# Patient Record
Sex: MALE | Race: WHITE | NOT HISPANIC OR LATINO | Employment: OTHER | URBAN - METROPOLITAN AREA
[De-identification: names, ages, dates, MRNs, and addresses within clinical notes are randomized per-mention and may not be internally consistent; named-entity substitution may affect disease eponyms.]

---

## 2017-01-11 ENCOUNTER — OFFICE VISIT (OUTPATIENT)
Dept: CARDIOLOGY | Facility: MEDICAL CENTER | Age: 77
End: 2017-01-11
Payer: MEDICARE

## 2017-01-11 VITALS
HEIGHT: 72 IN | SYSTOLIC BLOOD PRESSURE: 150 MMHG | DIASTOLIC BLOOD PRESSURE: 70 MMHG | HEART RATE: 103 BPM | OXYGEN SATURATION: 89 % | WEIGHT: 197 LBS | BODY MASS INDEX: 26.68 KG/M2

## 2017-01-11 DIAGNOSIS — I10 ESSENTIAL HYPERTENSION: ICD-10-CM

## 2017-01-11 DIAGNOSIS — K27.9 PEPTIC ULCER DISEASE: ICD-10-CM

## 2017-01-11 DIAGNOSIS — E78.2 MIXED HYPERLIPIDEMIA: ICD-10-CM

## 2017-01-11 DIAGNOSIS — I25.10 CORONARY ARTERY DISEASE INVOLVING NATIVE CORONARY ARTERY OF NATIVE HEART WITHOUT ANGINA PECTORIS: ICD-10-CM

## 2017-01-11 PROCEDURE — 99214 OFFICE O/P EST MOD 30 MIN: CPT | Performed by: INTERNAL MEDICINE

## 2017-01-11 RX ORDER — METOPROLOL TARTRATE 50 MG/1
50 TABLET, FILM COATED ORAL 2 TIMES DAILY
Qty: 180 TAB | Refills: 3 | Status: SHIPPED | OUTPATIENT
Start: 2017-01-11 | End: 2017-07-20

## 2017-01-11 RX ORDER — RANITIDINE 150 MG/1
150 TABLET ORAL 2 TIMES DAILY
Qty: 60 TAB | Refills: 5 | Status: SHIPPED | OUTPATIENT
Start: 2017-01-11 | End: 2017-08-22 | Stop reason: SDUPTHER

## 2017-01-11 RX ORDER — AMITRIPTYLINE HYDROCHLORIDE 150 MG/1
300 TABLET ORAL
Qty: 180 TAB | Refills: 3 | Status: SHIPPED | OUTPATIENT
Start: 2017-01-11 | End: 2017-11-09 | Stop reason: SDUPTHER

## 2017-01-11 ASSESSMENT — ENCOUNTER SYMPTOMS
SPEECH CHANGE: 0
SHORTNESS OF BREATH: 0
ORTHOPNEA: 0
HEMOPTYSIS: 0
WEAKNESS: 0
NAUSEA: 0
DIZZINESS: 0
ABDOMINAL PAIN: 0
VOMITING: 0
CLAUDICATION: 0
PND: 0
PALPITATIONS: 0
FOCAL WEAKNESS: 0

## 2017-01-11 NOTE — PROGRESS NOTES
Subjective:   Galileo Torrez is a 76 y.o. male who presents today for f/u CAD, HTN, PAD and dyslipidemia    Some atypical chest pain.  Able to ambulate with prosthesis.  No palpitations or dizziness.  BP has been high at rehab.  Has been dealing with his left leg stump but it has finally healed.  Still smoking.  Requestuing refill for ranitidine and amitriptyline  Recently had to change PCP    Past Medical History   Diagnosis Date   • Hypertension    • COPD    • Hyperlipidemia    • Upper GI bleed    • Fall      Multiple falls   • Abnormality of gait    • Transient alteration of awareness    • Atherosclerosis of renal artery ([facility])    • Unspecified disorder of kidney and ureter    • Umbilical hernia without mention of obstruction or gangrene    • Tobacco use disorder    • Insomnia, unspecified    • Peptic ulcer, unspecified site, unspecified as acute or chronic, without mention of hemorrhage, perforation, or obstruction    • Peripheral vascular disease, unspecified ([facility]) February 2012     PCI/stent (Omnilink 7 x 28mm) to the right external iliac.   • Aortic valve disorders    • Chronic systolic heart failure ([facility])    • Dizziness    • Pure hypercholesterolemia    • Unspecified hypertensive heart disease without heart failure    • Other specified forms of chronic ischemic heart disease    • Lumbago    • CAD (coronary artery disease) May 2009     LAD diffuse, 80% distal, %, OM1,%, Grafts all occluded LVEF 20%.  No targets for revascularization.   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  Unchanged from 2013.   • Dental disorder      dentures   • Cough    • Unspecified hemorrhagic conditions ([facility])      blood thinners   • Blood clotting disorder ([facility]) 10/15/15       left leg   • Breath shortness      says from smoking  No O2   • Heart burn    • Snoring    • Myocardial infarct ([facility]) 1995x2     5  "stents   • Stroke (HCC) 2011     \"I don't know when, somebody said I did. A little one\"   • Bowel habit changes      constipation     Past Surgical History   Procedure Laterality Date   • Gastroscopy-endo  3/3/2010     Performed by KALE JUAREZ at ENDOSCOPY [facility]   • Endostat  3/3/2010     Performed by KALE JUAREZ at ENDOSCOPY [facility]   • Sclerotheraphy  3/3/2010     Performed by KALE JUAREZ at ENDOSCOPY [facility]   • Cardiac cath, right/left heart       STENT   • Multiple coronary artery bypass  1994      CABG x 3, in [place]   • Recovery  2/28/2012     Performed by SURGERY, IR[facility]RECOVERY at [facility]   • Recovery  1/15/2015     Performed by Ir-Recovery Surgery at [facility]   • Femoral popliteal bypass  2/11/2015     Performed by Lance Austin M.D. at [facility]   • Other  1984     LEFT ORBIT FX-SURGERY PIN/PLATE   • Groin exploration  3/11/2015     Performed by Lance Austin M.D. at [facility]   • Thrombectomy  3/24/2015     Performed by Lance Austin M.D. at [facility]   • Angiogram  3/24/2015     Performed by Lance Austin M.D. at [facility]   • Recovery  9/4/2015     Procedure: CATH LAB-[facility] W/COROS AND STENT [id number], ANGINA PECTORIS 413.9;  Surgeon: [facility];  [place] [facility]  Service:    • Femoral popliteal bypass Left 10/2/2015     Procedure: FEMORAL POPLITEAL BYPASS;  Surgeon: Lance Austin M.D.;  Location: SURGERY [facility];  Service:    • Angiogram  10/2/2015     Procedure: ANGIOGRAM;  Surgeon: Lance Austin M.D.;  Location: [facility];  Service:    • Toe amputation Left 11/9/2015     Procedure: TOE AMPUTATION FIRST AND SECOND;  Surgeon: Lance Austin M.D.;  Location: [facility];  Service:    • Incision and drainage general Left 11/9/2015     Procedure: INCISION AND DRAINAGE GENERAL " "WASHOUT, & WOUND VAC PLACEMENT LEG  ;  Surgeon: Lance Austin M.D.;  Location: SURGERY [facility];  Service:    • Femoral popliteal bypass Left 11/13/2015     Procedure: FEMORAL POPLITEAL ANGIOPLASTY cryo preserve vein placement  ;  Surgeon: Lance Austin M.D.;  Location: SURGERY [facility];  Service:    • Angiogram Left 12/21/2015     Procedure: ANGIOGRAM, LEG;  Surgeon: Lance Austin M.D.;  Location: SURGERY [facility];  Service:    • Femoral tibial bypass Left 12/21/2015     Procedure: FEMORAL TIBIAL BYPASS with Distaflo Graft;  Surgeon: Lance Austin M.D.;  Location: SURGERY [facility];  Service:    • Knee amputation below Left 12/22/2015     Procedure: KNEE AMPUTATION BELOW , REMOVAL OF LEFT TIBIAL BYPASS GRAFT;  Surgeon: Lance Austin M.D.;  Location: [facility];  Service:    • Stump revision Left 5/11/2016     Procedure: STUMP REVISION BK DEBRIDEMENT;  Surgeon: Lance Austin M.D.;  Location: [facility];  Service:    • Split thickness skin graft Left 8/25/2016     Procedure: SPLIT THICKNESS SKIN GRAFT LEG;  Surgeon: Fuad Moreno M.D.;  Location: [facility];  Service:      Family History   Problem Relation Age of Onset   • Heart Disease Mother    • Other Mother      brain surg   • Cancer Brother      melanoma     History   Smoking status   • Current Every Day Smoker -- 1.00 packs/day for 55 years   • Types: Cigarettes   Smokeless tobacco   • Never Used     Allergies   Allergen Reactions   • Neosporin Pain Relieving Unspecified     Pt states \"I get blisters\".     Outpatient Encounter Prescriptions as of 1/11/2017   Medication Sig Dispense Refill   • ranitidine (ZANTAC) 150 MG Tab Take 1 Tab by mouth 2 times a day. 60 Tab 5   • metoprolol (LOPRESSOR) 50 MG Tab Take 1 Tab by mouth 2 times a day. 180 Tab 3   • Cholecalciferol (VITAMIN D3) 3000 UNITS Tab Take  by mouth.     • gabapentin (NEURONTIN) 300 MG Cap Take 2 Caps by mouth every bedtime. 180 " "Cap 3   • atorvastatin (LIPITOR) 80 MG tablet Take 1 Tab by mouth every evening. 90 Tab 3   • clopidogrel (PLAVIX) 75 MG Tab TAKE 1 TABLET BY MOUTH ONCE DAILY 90 Tab 3   • amitriptyline (ELAVIL) 150 MG Tab Take 2 Tabs by mouth every bedtime. Indications: Trouble Sleeping 180 Tab 3   • aspirin EC (ECOTRIN) 81 MG Tablet Delayed Response Take 81 mg by mouth every day. Indications: Heart Attack     • omeprazole (PRILOSEC) 20 MG delayed-release capsule Take 20 mg by mouth every day. Indications: Gastroesophageal Reflux Disease     • [DISCONTINUED] metoprolol (LOPRESSOR) 25 MG Tab Take 2 Tabs by mouth 2 times a day. 180 Tab 3   • ibuprofen (MOTRIN) 200 MG Tab Take 200 mg by mouth every 6 hours as needed.     • oxycodone-acetaminophen (PERCOCET) 5-325 MG Tab Take 1-2 Tabs by mouth every four hours as needed. (Patient not taking: Reported on 1/11/2017) 15 Tab 0   • [DISCONTINUED] metoprolol (LOPRESSOR) 25 MG Tab Take 1 Tab by mouth 2 times a day. 180 Tab 3   • naproxen (ANAPROX) 220 MG tablet Take 440 mg by mouth as needed. Indications: Mild to Moderate Pain     • [DISCONTINUED] ranitidine (ZANTAC) 150 MG Tab Take 150 mg by mouth 2 times a day.       No facility-administered encounter medications on file as of 1/11/2017.     Review of Systems   Constitutional: Negative for malaise/fatigue.   HENT: Negative for congestion.    Respiratory: Negative for hemoptysis and shortness of breath.    Cardiovascular: Negative for palpitations, orthopnea, claudication, leg swelling and PND.        Occasional sharp pain, brief   Gastrointestinal: Negative for nausea, vomiting and abdominal pain.   Musculoskeletal:        Left arm pain, constant   Neurological: Negative for dizziness, speech change, focal weakness and weakness.        Objective:   /70 mmHg  Pulse 103  Ht 1.829 m (6' 0.01\")  Wt 89.359 kg (197 lb)  BMI 26.71 kg/m2  SpO2 89%    Physical Exam   Constitutional: He is oriented to person, place, and time. He appears " well-developed. No distress.   HENT:   Mouth/Throat: Mucous membranes are normal.   Eyes: Conjunctivae and EOM are normal.   Neck: No JVD present. No tracheal deviation present. No thyroid mass and no thyromegaly present.   Cardiovascular: Normal rate, regular rhythm and intact distal pulses.    No murmur heard.  Pulmonary/Chest: Effort normal and breath sounds normal. No respiratory distress. He exhibits no tenderness.   Abdominal: Soft. There is no tenderness.   Musculoskeletal: Normal range of motion. He exhibits no edema.   Neurological: He is alert and oriented to person, place, and time. He has normal strength. He displays no tremor.   Skin: Skin is warm and dry. He is not diaphoretic.   Psychiatric: He has a normal mood and affect. His behavior is normal.   Vitals reviewed.    Labs October glucose and LDL both 101, CMP , NL AST/ALT    Assessment:     1. Coronary artery disease involving native coronary artery of native heart without angina pectoris  metoprolol (LOPRESSOR) 50 MG Tab    DISCONTINUED: metoprolol (LOPRESSOR) 25 MG Tab   2. Essential hypertension     3. Mixed hyperlipidemia    4. PEPTIC ULCER DISEASE ranitidine (ZANTAC) 150 MG Tab       Medical Decision Making:  Today's Assessment / Status / Plan:     BP is high, will increase metoprolol to 50 mg BID. To continue home BP monitor.  Will continue current dose of atorvastatin for now.  Recheck lipids in 6 months.  On multiple medications. Will consider Praluent or Repatha if LDL Still above 100.  Will see him back in 6 months with labs.

## 2017-01-11 NOTE — Clinical Note
[facility]   [address]  [place] [address]  Phone: [phone]  Fax: [phone]              Galileo Torrez  1940    Encounter Date: 1/11/2017    Vicki Jordan M.D.          PROGRESS NOTE:  Subjective:   Galileo Torrez is a 76 y.o. male who presents today for f/u CAD, HTN, PAD and dyslipidemia    Some atypical chest pain.  Able to ambulate with prosthesis.  No palpitations or dizziness.  BP has been high at rehab.  Has been dealing with his left leg stump but it has finally healed.  Still smoking.  Requestuing refill for ranitidine and amitriptyline  Recently had to change PCP    Past Medical History   Diagnosis Date   • Hypertension    • COPD    • Hyperlipidemia    • Upper GI bleed    • Fall      Multiple falls   • Abnormality of gait    • Transient alteration of awareness    • Atherosclerosis of renal artery (HCC)    • Unspecified disorder of kidney and ureter    • Umbilical hernia without mention of obstruction or gangrene    • Tobacco use disorder    • Insomnia, unspecified    • Peptic ulcer, unspecified site, unspecified as acute or chronic, without mention of hemorrhage, perforation, or obstruction    • Peripheral vascular disease, unspecified ([facility]) February 2012     PCI/stent (Omnilink 7 x 28mm) to the right external iliac.   • Aortic valve disorders    • Chronic systolic heart failure (HCC)    • Dizziness    • Pure hypercholesterolemia    • Unspecified hypertensive heart disease without heart failure    • Other specified forms of chronic ischemic heart disease    • Lumbago    • CAD (coronary artery disease) May 2009     LAD diffuse, 80% distal, %, OM1,%, Grafts all occluded LVEF 20%.  No targets for revascularization.   • Alcoholic polyneuropathy (HCC)    • Carotid artery stenosis August 2014     Carotid ultrasound with LICA 50-70% stenosis, YOLANDA <50% stenosis.  Unchanged from 2013.   • Dental  "disorder      dentures   • Cough    • Unspecified hemorrhagic conditions (HCC)      blood thinners   • Blood clotting disorder (HCC) 10/15/15       left leg   • Breath shortness      says from smoking  No O2   • Heart burn    • Snoring    • Myocardial infarct (HCC) 1995x2     5 stents   • Stroke ([facility]CC) 2011     \"I don't know when, somebody said I did. A little one\"   • Bowel habit changes      constipation     Past Surgical History   Procedure Laterality Date   • Gastroscopy-endo  3/3/2010     Performed by KALE JUAREZ at ENDOSCOPY [facility]   • Endostat  3/3/2010     Performed by KALE JUAREZ at ENDOSCOPY [facility]   • Sclerotheraphy  3/3/2010     Performed by KALE JUAREZ at ENDOSCOPY [facility]   • Cardiac cath, right/left heart       STENT   • Multiple coronary artery bypass  1994      CABG x 3, in [place]   • Recovery  2/28/2012     Performed by SURGERY, IR-RECOVERY at [facility]   • Recovery  1/15/2015     Performed by Ir-Recovery Surgery at [facility]   • Femoral popliteal bypass  2/11/2015     Performed by Lance Austin M.D. at [facility]   • Other  1984     LEFT ORBIT FX-SURGERY PIN/PLATE   • Groin exploration  3/11/2015     Performed by Lance Austin M.D. at [facility]   • Thrombectomy  3/24/2015     Performed by Lance Austin M.D. at [facility]   • Angiogram  3/24/2015     Performed by Lance Austin M.D. at [facility]   • Recovery  9/4/2015     Procedure: CATH LAB-[facility] W/COROS AND STENT [id number], ANGINA PECTORIS 413.9;  Surgeon: [facility];  Location: SURGERY PRE-[facility] PROC UN[facility];  Service:    • Femoral popliteal bypass Left 10/2/2015     Procedure: FEMORAL POPLITEAL BYPASS;  Surgeon: Lance Austin M.D.;  Location: [facility];  Service:    • Angiogram  10/2/2015     Procedure: ANGIOGRAM;  Surgeon: Lance Austin M.D.;  Location: [facility]" [facility];  Service:    • Toe amputation Left 11/9/2015     Procedure: TOE AMPUTATION FIRST AND SECOND;  Surgeon: Lance Austin M.D.;  Location: [facility];  Service:    • Incision and drainage general Left 11/9/2015     Procedure: INCISION AND DRAINAGE GENERAL WASHOUT, & WOUND VAC PLACEMENT LEG  ;  Surgeon: Lance Austin M.D.;  Location: [facility];  Service:    • Femoral popliteal bypass Left 11/13/2015     Procedure: FEMORAL POPLITEAL ANGIOPLASTY cryo preserve vein placement  ;  Surgeon: Lance Austin M.D.;  Location: [facility];  Service:    • Angiogram Left 12/21/2015     Procedure: ANGIOGRAM, LEG;  Surgeon: Lance Austin M.D.;  Location: [facility];  Service:    • Femoral tibial bypass Left 12/21/2015     Procedure: FEMORAL TIBIAL BYPASS with Distaflo Graft;  Surgeon: Lance Austin M.D.;  Location: [facility];  Service:    • Knee amputation below Left 12/22/2015     Procedure: KNEE AMPUTATION BELOW , REMOVAL OF LEFT TIBIAL BYPASS GRAFT;  Surgeon: Lance Austin M.D.;  Location: [facility];  Service:    • Stump revision Left 5/11/2016     Procedure: STUMP REVISION BK DEBRIDEMENT;  Surgeon: Lance Austin M.D.;  Location: [facility];  Service:    • Split thickness skin graft Left 8/25/2016     Procedure: SPLIT THICKNESS SKIN GRAFT LEG;  Surgeon: Fuad Moreno M.D.;  Location: [facility];  Service:      Family History   Problem Relation Age of Onset   • Heart Disease Mother    • Other Mother      brain surg   • Cancer Brother      melanoma     History   Smoking status   • Current Every Day Smoker -- 1.00 packs/day for 55 years   • Types: Cigarettes   Smokeless tobacco   • Never Used     Allergies   Allergen Reactions   • Neosporin Pain Relieving Unspecified     Pt states \"I get blisters\".     Outpatient Encounter Prescriptions as of 1/11/2017   Medication Sig Dispense Refill   • ranitidine (ZANTAC) 150 MG " Tab Take 1 Tab by mouth 2 times a day. 60 Tab 5   • metoprolol (LOPRESSOR) 50 MG Tab Take 1 Tab by mouth 2 times a day. 180 Tab 3   • Cholecalciferol (VITAMIN D3) 3000 UNITS Tab Take  by mouth.     • gabapentin (NEURONTIN) 300 MG Cap Take 2 Caps by mouth every bedtime. 180 Cap 3   • atorvastatin (LIPITOR) 80 MG tablet Take 1 Tab by mouth every evening. 90 Tab 3   • clopidogrel (PLAVIX) 75 MG Tab TAKE 1 TABLET BY MOUTH ONCE DAILY 90 Tab 3   • amitriptyline (ELAVIL) 150 MG Tab Take 2 Tabs by mouth every bedtime. Indications: Trouble Sleeping 180 Tab 3   • aspirin EC (ECOTRIN) 81 MG Tablet Delayed Response Take 81 mg by mouth every day. Indications: Heart Attack     • omeprazole (PRILOSEC) 20 MG delayed-release capsule Take 20 mg by mouth every day. Indications: Gastroesophageal Reflux Disease     • [DISCONTINUED] metoprolol (LOPRESSOR) 25 MG Tab Take 2 Tabs by mouth 2 times a day. 180 Tab 3   • ibuprofen (MOTRIN) 200 MG Tab Take 200 mg by mouth every 6 hours as needed.     • oxycodone-acetaminophen (PERCOCET) 5-325 MG Tab Take 1-2 Tabs by mouth every four hours as needed. (Patient not taking: Reported on 1/11/2017) 15 Tab 0   • [DISCONTINUED] metoprolol (LOPRESSOR) 25 MG Tab Take 1 Tab by mouth 2 times a day. 180 Tab 3   • naproxen (ANAPROX) 220 MG tablet Take 440 mg by mouth as needed. Indications: Mild to Moderate Pain     • [DISCONTINUED] ranitidine (ZANTAC) 150 MG Tab Take 150 mg by mouth 2 times a day.       No facility-administered encounter medications on file as of 1/11/2017.     Review of Systems   Constitutional: Negative for malaise/fatigue.   HENT: Negative for congestion.    Respiratory: Negative for hemoptysis and shortness of breath.    Cardiovascular: Negative for palpitations, orthopnea, claudication, leg swelling and PND.        Occasional sharp pain, brief   Gastrointestinal: Negative for nausea, vomiting and abdominal pain.   Musculoskeletal:        Left arm pain, constant   Neurological: Negative  "for dizziness, speech change, focal weakness and weakness.        Objective:   /70 mmHg  Pulse 103  Ht 1.829 m (6' 0.01\")  Wt 89.359 kg (197 lb)  BMI 26.71 kg/m2  SpO2 89%    Physical Exam   Constitutional: He is oriented to person, place, and time. He appears well-developed. No distress.   HENT:   Mouth/Throat: Mucous membranes are normal.   Eyes: Conjunctivae and EOM are normal.   Neck: No JVD present. No tracheal deviation present. No thyroid mass and no thyromegaly present.   Cardiovascular: Normal rate, regular rhythm and intact distal pulses.    No murmur heard.  Pulmonary/Chest: Effort normal and breath sounds normal. No respiratory distress. He exhibits no tenderness.   Abdominal: Soft. There is no tenderness.   Musculoskeletal: Normal range of motion. He exhibits no edema.   Neurological: He is alert and oriented to person, place, and time. He has normal strength. He displays no tremor.   Skin: Skin is warm and dry. He is not diaphoretic.   Psychiatric: He has a normal mood and affect. His behavior is normal.   Vitals reviewed.    Labs October glucose and LDL both 101, CMP , NL AST/ALT    Assessment:     1. Coronary artery disease involving native coronary artery of native heart without angina pectoris  metoprolol (LOPRESSOR) 50 MG Tab    DISCONTINUED: metoprolol (LOPRESSOR) 25 MG Tab   2. Essential hypertension     3. Mixed hyperlipidemia    4. PEPTIC ULCER DISEASE ranitidine (ZANTAC) 150 MG Tab       Medical Decision Making:  Today's Assessment / Status / Plan:     BP is high, will increase metoprolol to 50 mg BID. To continue home BP monitor.  Will continue current dose of atorvastatin for now.  Recheck lipids in 6 months.  On multiple medications. Will consider Praluent or Repatha if LDL Still above 100.  Will see him back in 6 months with labs.      No Recipients                "

## 2017-01-11 NOTE — MR AVS SNAPSHOT
"        Galileo Burnettreyna   2017 10:00 AM   Office Visit   MRN: [id number]    Department:  [facility]   Dept Phone:  [phone]    Description:  Male : 1940   Provider:  Vicki Jordan M.D.           Reason for Visit     Follow-Up           Allergies as of 2017     Allergen Noted Reactions    Neosporin Pain Relieving 2016   Unspecified    Pt states \"I get blisters\".      You were diagnosed with     Coronary artery disease involving native coronary artery of native heart without angina pectoris   [7605763]       Essential hypertension   [4204195]       Pure hypercholesterolemia   [272.0.ICD-9-CM]       Peptic ulcer disease   [095121]         Vital Signs     Blood Pressure Pulse Height Weight Body Mass Index Oxygen Saturation    150/70 mmHg 103 1.829 m (6' 0.01\") 89.359 kg (197 lb) 26.71 kg/m2 89%    Smoking Status                   Current Every Day Smoker           Basic Information     Date Of Birth Sex Race Ethnicity Preferred Language    1940 Male White Non- English      Problem List              ICD-10-CM Priority Class Noted - Resolved    Abnormality of gait R26.9 Low  Unknown - Present    Coronary artery disease involving native coronary artery without angina pectoris I25.10 Medium  Unknown - Present    Atherosclerosis of renal artery (HCC) I70.1 Medium  Unknown - Present    Umbilical hernia K42.9 Low  Unknown - Present    Tobacco use disorder F17.200 Medium  Unknown - Present    Insomnia G47.00 Low  Unknown - Present    Chronic airway obstruction, not elsewhere classified (HCC) J44.9 Low  Unknown - Present    Chronic systolic heart failure (HCC) I50.22 Medium  Unknown - Present    Essential hypertension I10 Medium  Unknown - Present    Pure hypercholesterolemia E78.00 Medium  Unknown - Present    Lumbago M54.5 Low  Unknown - Present    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 Low  Unknown - Present    Carotid artery stenosis I65.29 Medium  2012 - " Present    Rhabdomyolysis M62.82 Medium  10/15/2014 - Present    Alcohol abuse F10.10 Medium  10/15/2014 - Present    Frequent falls R29.6 High  10/25/2014 - Present    Normocytic anemia D64.9 Low  12/11/2014 - Present    Cellulitis and abscess of leg, except foot L02.419, L03.119 Medium  3/11/2015 - Present    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 High  3/24/2015 - Present    Shortness of breath R06.02 Medium  5/15/2015 - Present    Aortic insufficiency I35.1   9/25/2015 - Present    Pulmonary HTN ([facility]) I27.2   9/25/2015 - Present    COPD (chronic obstructive pulmonary disease) ([facility]) J44.9   9/25/2015 - Present    Leukocytosis D72.829   9/27/2015 - Present    Metabolic encephalopathy G93.41   10/5/2015 - Present    Long QT interval R94.31   10/7/2015 - Present    Deep vein thrombosis [I82.409] I82.409   10/22/2015 - Present    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   11/10/2015 - Present    Coronary artery disease I25.10 Medium  11/19/2015 - Present    Epigastric pain R10.13 High  11/19/2015 - Present    Peptic ulcer disease K27.9   11/19/2015 - Present    Peripheral arterial disease ([facility]) I73.9   12/21/2015 - Present    Wound infection T14.8, L08.9   4/4/2016 - Present    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   5/11/2016 - Present    Pain, phantom limb ([facility]) G54.6   7/26/2016 - Present    Open wound T14.8   8/25/2016 - Present    Encounter to establish care with new doctor Z71.89   9/15/2016 - Present      Health Maintenance        Date Due Completion Dates    IMM DTaP/Tdap/Td Vaccine (1 - Tdap) 11/24/1961 ---    IMM ZOSTER VACCINE 11/24/2002 ---    COLONOSCOPY 7/18/2026 7/18/2016 (Declined)    Override on 7/18/2016: Patient Declined (pt decision)            Current Immunizations     13-VALENT PCV PREVNAR 10/4/2015  1:37 AM    Influenza TIV (IM) 10/1/2014, 10/5/2011  9:00 PM    Influenza Vaccine Adult HD 10/12/2016, 10/4/2015  1:41 AM    Pneumococcal Vaccine (UF)Historical Data 3/4/2010   3:49 PM    Pneumococcal polysaccharide vaccine (PPSV-23) 10/6/2011  9:48 AM, 3/4/2010  3:40 PM      Below and/or attached are the medications your provider expects you to take. Review all of your home medications and newly ordered medications with your provider and/or pharmacist. Follow medication instructions as directed by your provider and/or pharmacist. Please keep your medication list with you and share with your provider. Update the information when medications are discontinued, doses are changed, or new medications (including over-the-counter products) are added; and carry medication information at all times in the event of emergency situations     Allergies:  NEOSPORIN PAIN RELIEVING - Unspecified               Medications  Valid as of: January 11, 2017 - 10:22 AM    Generic Name Brand Name Tablet Size Instructions for use    Amitriptyline HCl (Tab) ELAVIL 150 MG Take 2 Tabs by mouth every bedtime. Indications: Trouble Sleeping        Aspirin (Tablet Delayed Response) ECOTRIN 81 MG Take 81 mg by mouth every day. Indications: Heart Attack        Atorvastatin Calcium (Tab) LIPITOR 80 MG Take 1 Tab by mouth every evening.        Cholecalciferol (Tab) Vitamin D3 3000 UNITS Take  by mouth.        Clopidogrel Bisulfate (Tab) PLAVIX 75 MG TAKE 1 TABLET BY MOUTH ONCE DAILY        Gabapentin (Cap) NEURONTIN 300 MG Take 2 Caps by mouth every bedtime.        Ibuprofen (Tab) MOTRIN 200 MG Take 200 mg by mouth every 6 hours as needed.        Metoprolol Tartrate (Tab) LOPRESSOR 50 MG Take 1 Tab by mouth 2 times a day.        Naproxen Sodium (Tab) ANAPROX 220 MG Take 440 mg by mouth as needed. Indications: Mild to Moderate Pain        Omeprazole (CAPSULE DELAYED RELEASE) PRILOSEC 20 MG Take 20 mg by mouth every day. Indications: Gastroesophageal Reflux Disease        Oxycodone-Acetaminophen (Tab) PERCOCET 5-325 MG Take 1-2 Tabs by mouth every four hours as needed.        RaNITidine HCl (Tab) ZANTAC 150 MG Take 1 Tab by  mouth 2 times a day.        .                 Medicines prescribed today were sent to:     [id number]  [place], [place] - [address] AT [facility] [address] [place] [address]    Phone: [phone] Fax: [phone]    Open 24 Hours?: No      Medication refill instructions:       If your prescription bottle indicates you have medication refills left, it is not necessary to call your provider’s office. Please contact your pharmacy and they will refill your medication.    If your prescription bottle indicates you do not have any refills left, you may request refills at any time through one of the following ways: The online [employer] system (except Urgent Care), by calling your provider’s office, or by asking your pharmacy to contact your provider’s office with a refill request. Medication refills are processed only during regular business hours and may not be available until the next business day. Your provider may request additional information or to have a follow-up visit with you prior to refilling your medication.   *Please Note: Medication refills are assigned a new Rx number when refilled electronically. Your pharmacy may indicate that no refills were authorized even though a new prescription for the same medication is available at the pharmacy. Please request the medicine by name with the pharmacy before contacting your provider for a refill.        Your To Do List     Future Labs/Procedures Complete By Expires    COMP METABOLIC PANEL  As directed 7/13/2017    LIPID PROFILE  As directed 7/13/2017         [employer] Status: Patient Declined

## 2017-05-18 ENCOUNTER — HOSPITAL ENCOUNTER (OUTPATIENT)
Facility: MEDICAL CENTER | Age: 77
End: 2017-05-18
Attending: PLASTIC SURGERY
Payer: MEDICARE

## 2017-05-18 PROCEDURE — 88305 TISSUE EXAM BY PATHOLOGIST: CPT

## 2017-06-02 RX ORDER — CLOPIDOGREL BISULFATE 75 MG/1
75 TABLET ORAL
COMMUNITY
End: 2017-07-20

## 2017-06-08 ENCOUNTER — HOSPITAL ENCOUNTER (OUTPATIENT)
Facility: MEDICAL CENTER | Age: 77
End: 2017-06-08
Attending: PLASTIC SURGERY | Admitting: PLASTIC SURGERY
Payer: MEDICARE

## 2017-06-08 VITALS
HEART RATE: 66 BPM | SYSTOLIC BLOOD PRESSURE: 133 MMHG | BODY MASS INDEX: 26.78 KG/M2 | TEMPERATURE: 97.4 F | DIASTOLIC BLOOD PRESSURE: 62 MMHG | OXYGEN SATURATION: 92 % | WEIGHT: 197.75 LBS | RESPIRATION RATE: 16 BRPM | HEIGHT: 72 IN

## 2017-06-08 PROBLEM — C44.229 SQUAMOUS CELL CARCINOMA OF SKIN OF LEFT EAR AND EXTERNAL AURICULAR CANAL: Status: ACTIVE | Noted: 2017-06-08

## 2017-06-08 PROCEDURE — 160035 HCHG PACU - 1ST 60 MINS PHASE I: Performed by: PLASTIC SURGERY

## 2017-06-08 PROCEDURE — 160039 HCHG SURGERY MINUTES - EA ADDL 1 MIN LEVEL 3: Performed by: PLASTIC SURGERY

## 2017-06-08 PROCEDURE — 160036 HCHG PACU - EA ADDL 30 MINS PHASE I: Performed by: PLASTIC SURGERY

## 2017-06-08 PROCEDURE — 160028 HCHG SURGERY MINUTES - 1ST 30 MINS LEVEL 3: Performed by: PLASTIC SURGERY

## 2017-06-08 PROCEDURE — 700102 HCHG RX REV CODE 250 W/ 637 OVERRIDE(OP)

## 2017-06-08 PROCEDURE — 160048 HCHG OR STATISTICAL LEVEL 1-5: Performed by: PLASTIC SURGERY

## 2017-06-08 PROCEDURE — 160009 HCHG ANES TIME/MIN: Performed by: PLASTIC SURGERY

## 2017-06-08 PROCEDURE — 502240 HCHG MISC OR SUPPLY RC 0272: Performed by: PLASTIC SURGERY

## 2017-06-08 PROCEDURE — A9270 NON-COVERED ITEM OR SERVICE: HCPCS

## 2017-06-08 PROCEDURE — 88305 TISSUE EXAM BY PATHOLOGIST: CPT

## 2017-06-08 PROCEDURE — 700111 HCHG RX REV CODE 636 W/ 250 OVERRIDE (IP)

## 2017-06-08 PROCEDURE — 160002 HCHG RECOVERY MINUTES (STAT): Performed by: PLASTIC SURGERY

## 2017-06-08 PROCEDURE — 700101 HCHG RX REV CODE 250

## 2017-06-08 PROCEDURE — 501838 HCHG SUTURE GENERAL: Performed by: PLASTIC SURGERY

## 2017-06-08 RX ORDER — SCOLOPAMINE TRANSDERMAL SYSTEM 1 MG/1
PATCH, EXTENDED RELEASE TRANSDERMAL
Status: COMPLETED
Start: 2017-06-08 | End: 2017-06-08

## 2017-06-08 RX ORDER — SODIUM BICARBONATE 42 MG/ML
INJECTION, SOLUTION INTRAVENOUS
Status: DISCONTINUED | OUTPATIENT
Start: 2017-06-08 | End: 2017-06-08 | Stop reason: HOSPADM

## 2017-06-08 RX ORDER — SODIUM CHLORIDE, SODIUM LACTATE, POTASSIUM CHLORIDE, CALCIUM CHLORIDE 600; 310; 30; 20 MG/100ML; MG/100ML; MG/100ML; MG/100ML
INJECTION, SOLUTION INTRAVENOUS CONTINUOUS
Status: DISCONTINUED | OUTPATIENT
Start: 2017-06-08 | End: 2017-06-08 | Stop reason: HOSPADM

## 2017-06-08 RX ORDER — BUPIVACAINE HYDROCHLORIDE 2.5 MG/ML
INJECTION, SOLUTION EPIDURAL; INFILTRATION; INTRACAUDAL
Status: DISCONTINUED | OUTPATIENT
Start: 2017-06-08 | End: 2017-06-08 | Stop reason: HOSPADM

## 2017-06-08 RX ORDER — SODIUM BICARBONATE 42 MG/ML
INJECTION, SOLUTION INTRAVENOUS
Status: DISCONTINUED
Start: 2017-06-08 | End: 2017-06-08 | Stop reason: HOSPADM

## 2017-06-08 RX ORDER — BACITRACIN ZINC 500 [USP'U]/G
OINTMENT TOPICAL
Status: DISCONTINUED | OUTPATIENT
Start: 2017-06-08 | End: 2017-06-08 | Stop reason: HOSPADM

## 2017-06-08 RX ADMIN — SCOPALAMINE 1 PATCH: 1 PATCH, EXTENDED RELEASE TRANSDERMAL at 08:45

## 2017-06-08 RX ADMIN — SODIUM CHLORIDE, SODIUM LACTATE, POTASSIUM CHLORIDE, CALCIUM CHLORIDE: 600; 310; 30; 20 INJECTION, SOLUTION INTRAVENOUS at 09:30

## 2017-06-08 ASSESSMENT — PAIN SCALES - GENERAL
PAINLEVEL_OUTOF10: 0

## 2017-06-08 NOTE — OR NURSING
1010 Received report from Krystal CATALAN, assumed care of pt at this time. Pt.'s VS WNL, pt sleeping, arouses to voice. Pt has incision site to left ear, sutures visible, no dressing in place. 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      1015 Pt has slight oozing to surgical site.     1030 Pt.'s ride called and updated with plan of care.     1040 Pt titrated to RA.     1115 Pt meets criteria for discharge, up and dressing self with assistance from RN. No blood oozing from surgical site.     1135 Pt and ex-Wife given instructions for discharge, evidence of understanding demonstrated. Pt ok to restart plavix today per Dr. Moreno.     1140 Pt out to car in .

## 2017-06-08 NOTE — IP AVS SNAPSHOT
Home Care Instructions                                                                                                                [facility]  Medical Record Number:[id number]  CSN: [id number]    YOB: 1940   Age: 76 y.o.  Sex: male  HT:1.829 m (6') WT: 89.7 kg (197 lb 12 oz)          Admit Date: 6/8/2017     Discharge Date:   Today's Date: 6/8/2017  Attending Doctor:  Fuad Moreno M.D.                  Allergies:  Neosporin pain relieving              Follow-up Information     1. Follow up with Fuad Moreno M.D.. Schedule an appointment as soon as possible for a visit in 2 weeks.    Specialty:  Plastic Surgery    Contact information    [address]  [place] [address]  [phone]          Discharge Instructions         ACTIVITY: Rest and take it easy for the first 24 hours.  A responsible adult is recommended to remain with you during that time.  It is normal to feel sleepy.  We encourage you to not do anything that requires balance, judgment or coordination.    MILD FLU-LIKE SYMPTOMS ARE NORMAL. YOU MAY EXPERIENCE GENERALIZED MUSCLE ACHES, THROAT IRRITATION, HEADACHE AND/OR SOME NAUSEA.    FOR 24 HOURS DO NOT:  Drive, operate machinery or run household appliances.  Drink beer or alcoholic beverages.   Make important decisions or sign legal documents.    SPECIAL INSTRUCTIONS: *none**    DIET: To avoid nausea, slowly advance diet as tolerated, avoiding spicy or greasy foods for the first day.  Add more substantial food to your diet according to your physician's instructions.  Babies can be fed formula or breast milk as soon as they are hungry.  INCREASE FLUIDS AND FIBER TO AVOID CONSTIPATION.    SURGICAL DRESSING/BATHING: *Keep incision dry for 24 hours then may wash incision with soap and water and **    FOLLOW-UP APPOINTMENT:  A follow-up appointment should be arranged with your doctor in *call to schedule**.    You should CALL YOUR PHYSICIAN if you develop:  Fever  greater than 101 degrees F.  Pain not relieved by medication, or persistent nausea or vomiting.  Excessive bleeding (blood soaking through dressing) or unexpected drainage from the wound.  Extreme redness or swelling around the incision site, drainage of pus or foul smelling drainage.  Inability to urinate or empty your bladder within 8 hours.  Problems with breathing or chest pain.    You should call 911 if you develop problems with breathing or chest pain.  If you are unable to contact your doctor or surgical center, you should go to the nearest emergency room or urgent care center.  Physician's telephone #: *Dr. Moreno [phone]**    If any questions arise, call your doctor.  If your doctor is not available, please feel free to call the Surgical Center at [phone].  The Center is open Monday through Friday from 7AM to 7PM.  You can also call the [employer] HOTLINE open 24 hours/day, 7 days/week and speak to a nurse at [phone], or toll free at [phone].    A registered nurse may call you a few days after your surgery to see how you are doing after your procedure.    MEDICATIONS: Resume taking daily medication.  Take prescribed pain medication with food.  If no medication is prescribed, you may take non-aspirin pain medication if needed.  PAIN MEDICATION CAN BE VERY CONSTIPATING.  Take a stool softener or laxative such as senokot, pericolace, or milk of magnesia if needed.    Prescription given for *none**.  Last pain medication given at *none given at hospital**.    If your physician has prescribed pain medication that includes Acetaminophen (Tylenol), do not take additional Acetaminophen (Tylenol) while taking the prescribed medication.    Depression / Suicide Risk    As you are discharged from this [facility] facility, it is important to learn how to keep safe from harming yourself.    Recognize the warning signs:  · Abrupt changes in personality, positive or negative- including increase in energy     · Giving away possessions  · Change in eating patterns- significant weight changes-  positive or negative  · Change in sleeping patterns- unable to sleep or sleeping all the time   · Unwillingness or inability to communicate  · Depression  · Unusual sadness, discouragement and loneliness  · Talk of wanting to die  · Neglect of personal appearance   · Rebelliousness- reckless behavior  · Withdrawal from people/activities they love  · Confusion- inability to concentrate     If you or a loved one observes any of these behaviors or has concerns about self-harm, here's what you can do:  · Talk about it- your feelings and reasons for harming yourself  · Remove any means that you might use to hurt yourself (examples: pills, rope, extension cords, firearm)  · Get professional help from the community (Mental Health, Substance Abuse, psychological counseling)  · Do not be alone:Call your Safe Contact- someone whom you trust who will be there for you.  · Call your local CRISIS HOTLINE 614-1079 or 478-681-8787  · Call your local Children's Mobile Crisis Response Team [place] (463) 963-6258 or [employer]  · Call the toll free National Suicide Prevention Hotlines   · National Suicide Prevention Lifeline 485-978-ABIV (9443)  · National Hope Line Network 800-SUICIDE (631-1862)       Medication List      CONTINUE taking these medications        Instructions    Morning Afternoon Evening Bedtime    amitriptyline 150 MG Tabs   Commonly known as:  ELAVIL        Take 2 Tabs by mouth every bedtime. Indications: Trouble Sleeping   Dose:  300 mg                        aspirin EC 81 MG Tbec   Commonly known as:  ECOTRIN        Take 81 mg by mouth every morning. Indications: Heart Attack   Dose:  81 mg                        atorvastatin 80 MG tablet   Commonly known as:  LIPITOR        Take 1 Tab by mouth every evening.   Dose:  80 mg                        clopidogrel 75 MG Tabs   Commonly known as:  PLAVIX        Take 75  mg by mouth every bedtime.   Dose:  75 mg                        gabapentin 300 MG Caps   Commonly known as:  NEURONTIN        Take 2 Caps by mouth every bedtime.   Dose:  600 mg                        metoprolol 50 MG Tabs   Commonly known as:  LOPRESSOR        Take 1 Tab by mouth 2 times a day.   Dose:  50 mg                        omeprazole 20 MG delayed-release capsule   Commonly known as:  PRILOSEC        Take 20 mg by mouth every morning. Indications: Gastroesophageal Reflux Disease   Dose:  20 mg                        ranitidine 150 MG Tabs   Commonly known as:  ZANTAC        Take 1 Tab by mouth 2 times a day.   Dose:  150 mg                                Medication Information     Above and/or attached are the medications your physician expects you to take upon discharge. Review all of your home medications and newly ordered medications with your doctor and/or pharmacist. Follow medication instructions as directed by your doctor and/or pharmacist. Please keep your medication list with you and share with your physician. Update the information when medications are discontinued, doses are changed, or new medications (including over-the-counter products) are added; and carry medication information at all times in the event of emergency situations.        Resources     Quit Smoking / Tobacco Use:    I understand the use of any tobacco products increases my chance of suffering from future heart disease or stroke and could cause other illnesses which may shorten my life. Quitting the use of tobacco products is the single most important thing I can do to improve my health. For further information on smoking / tobacco cessation call a Toll Free Quit Line at 1-657.718.9483 (*[employer] [place]) or 1-661.480.2569 (American Lung Association) or you can access the web based program at www.lungusa.org.    [place] Tobacco Users Help Line:  (845) 248-9665       Toll Free: 1-920.592.9547  Quit Tobacco Program [facility] (772)418-7971    Crisis Hotline:    [place] Crisis Hotline:  4-611-UPPFOTB or 1-219.526.9067    [place] Crisis Hotline:    1-713.229.3541 or 887-904-4482    Discharge Survey:   Thank you for choosing [facility]. We hope we did everything we could to make your hospital stay a pleasant one. You may be receiving a survey and we would appreciate your time and participation in answering the questions. Your input is very valuable to us in our efforts to improve our service to our patients and their families.            Signatures     My signature on this form indicates that:    1. I acknowledge receipt and understanding of these Home Care Instruction.  2. My questions regarding this information have been answered to my satisfaction.  3. I have formulated a plan with my discharge nurse to obtain my prescribed medications for home.    __________________________________      __________________________________                   Patient Signature                                 Guardian/Responsible Adult Signature      __________________________________                 __________       ________                       Nurse Signature                                               Date                 Time

## 2017-06-08 NOTE — OR SURGEON
Immediate Post-Operative Note      PreOp Diagnosis: scca left ear    PostOp Diagnosis: same    Procedure(s):  MASS EXCISION GENERAL - SQUAMOUS CELL - EAR   FLAP CLOSURE    Surgeon(s):  Fuad Moreno M.D.    Anesthesiologist/Type of Anesthesia:  Anesthesiologist: Jesús Claudio M.D./* No anesthesia type entered *    Surgical Staff:  Circulator: Jihan Negrete R.N.  Scrub Person: Teresa Jo    Specimen:ear    Estimated Blood Loss: minimal    Findings: scar from previous biopsy    Complications: none        6/8/2017 9:04 AM Fuad Moreno

## 2017-06-08 NOTE — OP REPORT
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    PROCEDURE PERFORMED:  Excision of a 3 cm squamous cell carcinoma of the ear   with adjacent tissue rearrangement for closure.    SURGEON:  Fuad Moreno MD.    ASSISTANT:  None.    ANESTHESIA:  Local with modified anesthesia care, Dr. Shanon M.D.        OPERATIVE INDICATIONS:  A 76-year-old, biopsy proven squamous cell carcinoma.    The biopsy had positive margins.  The patient understood the risks, benefits,   alternatives to a more complete resection and flap closure.    OPERATIVE DESCRIPTION:  After induction of  IV sedation, patient's ear was   prepped and draped sterilely.  Local anesthesia was infiltrated to the   operative site.  An excision was designed in order to remove the skin and   cartilage underlying the defect and allowed the wound to be closed with a star   flap both superiorly and inferiorly.  Skin incision was made.  Incision was   made to incorporate the flap incisions.  Bleeding was treated with   electrocautery.  Flaps were advanced.  Wounds were closed.  Contour of the ear   was good, a bit smaller, but the patient knew this would be the case.  Again,   the wounds were closed in layers.  At the conclusion of closure, there were   no complications.  Needle, sponge, and instrument count were correct.    Antibiotic ointment was applied.  The patient was taken to recovery room in   stable condition.       ____________________________________     FUAD MORENO MD    SWW / NTS    DD:  06/08/2017 09:53:36  DT:  06/08/2017 10:53:17    D#:  [id number]  Job#:  [id number]

## 2017-06-08 NOTE — IP AVS SNAPSHOT
6/8/2017    Galileo Dasilva [facility]  [address]  [place] [address]    Dear Galileo:    [facility] wants to ensure your discharge home is safe and you or your loved ones have had all of your questions answered regarding your care after you leave the hospital.    Below is a list of resources and contact information should you have any questions regarding your hospital stay, follow-up instructions, or active medical symptoms.    Questions or Concerns Regarding… Contact   Medical Questions Related to Your Discharge  (7 days a week, 8am-5pm) Contact a Nurse Care Coordinator   [phone]   Medical Questions Not Related to Your Discharge  (24 hours a day / 7 days a week)  Contact the Nurse Health Line   [phone]    Medications or Discharge Instructions Refer to your discharge packet   or contact your [facility] Primary Care Provider   [phone]   Follow-up Appointment(s) Schedule your appointment via [employer]   or contact Scheduling [phone]   Billing Review your statement via [employer]  or contact Billing [phone]   Medical Records Review your records via [employer]   or contact Medical Records [phone]     You may receive a telephone call within two days of discharge. This call is to make certain you understand your discharge instructions and have the opportunity to have any questions answered. You can also easily access your medical information, test results and upcoming appointments via the [employer] free online health management tool. You can learn more and sign up at [employer]. For assistance setting up your [employer] account, please call [phone].    Once again, we want to ensure your discharge home is safe and that you have a clear understanding of any next steps in your care. If you have any questions or concerns, please do not hesitate to contact us, we are here for you. Thank you for choosing [facility] for your healthcare needs.    Sincerely,    Your [facility] Healthcare Team

## 2017-06-08 NOTE — DISCHARGE INSTRUCTIONS
ACTIVITY: Rest and take it easy for the first 24 hours.  A responsible adult is recommended to remain with you during that time.  It is normal to feel sleepy.  We encourage you to not do anything that requires balance, judgment or coordination.    MILD FLU-LIKE SYMPTOMS ARE NORMAL. YOU MAY EXPERIENCE GENERALIZED MUSCLE ACHES, THROAT IRRITATION, HEADACHE AND/OR SOME NAUSEA.    FOR 24 HOURS DO NOT:  Drive, operate machinery or run household appliances.  Drink beer or alcoholic beverages.   Make important decisions or sign legal documents.    SPECIAL INSTRUCTIONS: *none**    DIET: To avoid nausea, slowly advance diet as tolerated, avoiding spicy or greasy foods for the first day.  Add more substantial food to your diet according to your physician's instructions.  Babies can be fed formula or breast milk as soon as they are hungry.  INCREASE FLUIDS AND FIBER TO AVOID CONSTIPATION.    SURGICAL DRESSING/BATHING: *Keep incision dry for 24 hours then may wash incision with soap and water and **    FOLLOW-UP APPOINTMENT:  A follow-up appointment should be arranged with your doctor in *call to schedule**.    You should CALL YOUR PHYSICIAN if you develop:  Fever greater than 101 degrees F.  Pain not relieved by medication, or persistent nausea or vomiting.  Excessive bleeding (blood soaking through dressing) or unexpected drainage from the wound.  Extreme redness or swelling around the incision site, drainage of pus or foul smelling drainage.  Inability to urinate or empty your bladder within 8 hours.  Problems with breathing or chest pain.    You should call 911 if you develop problems with breathing or chest pain.  If you are unable to contact your doctor or surgical center, you should go to the nearest emergency room or urgent care center.  Physician's telephone #: *Dr. Moreno [phone]**    If any questions arise, call your doctor.  If your doctor is not available, please feel free to call the Surgical Center at  [phone].  The Center is open Monday through Friday from 7AM to 7PM.  You can also call the HEALTH HOTLINE open 24 hours/day, 7 days/week and speak to a nurse at [phone], or toll free at [phone].    A registered nurse may call you a few days after your surgery to see how you are doing after your procedure.    MEDICATIONS: Resume taking daily medication.  Take prescribed pain medication with food.  If no medication is prescribed, you may take non-aspirin pain medication if needed.  PAIN MEDICATION CAN BE VERY CONSTIPATING.  Take a stool softener or laxative such as senokot, pericolace, or milk of magnesia if needed.    Prescription given for *none**.  Last pain medication given at *none given at hospital**.    If your physician has prescribed pain medication that includes Acetaminophen (Tylenol), do not take additional Acetaminophen (Tylenol) while taking the prescribed medication.    Depression / Suicide Risk    As you are discharged from this [facility] facility, it is important to learn how to keep safe from harming yourself.    Recognize the warning signs:  · Abrupt changes in personality, positive or negative- including increase in energy   · Giving away possessions  · Change in eating patterns- significant weight changes-  positive or negative  · Change in sleeping patterns- unable to sleep or sleeping all the time   · Unwillingness or inability to communicate  · Depression  · Unusual sadness, discouragement and loneliness  · Talk of wanting to die  · Neglect of personal appearance   · Rebelliousness- reckless behavior  · Withdrawal from people/activities they love  · Confusion- inability to concentrate     If you or a loved one observes any of these behaviors or has concerns about self-harm, here's what you can do:  · Talk about it- your feelings and reasons for harming yourself  · Remove any means that you might use to hurt yourself (examples: pills, rope, extension cords,  firearm)  · Get professional help from the community (Mental Health, Substance Abuse, psychological counseling)  · Do not be alone:Call your Safe Contact- someone whom you trust who will be there for you.  · Call your local CRISIS HOTLINE 157-9439 or 292-934-8629  · Call your local Children's Mobile Crisis Response Team [place] (252) 104-0606 or www.Reglare  · Call the toll free National Suicide Prevention Hotlines   · National Suicide Prevention Lifeline 777-181-YGRE (4488)  · National Hope Line Network 800-SUICIDE (373-0580)

## 2017-06-08 NOTE — OR NURSING
0954 Received from Or, report from Dr. Vee.  Left ear incision CDI.      1010 Report/ Handoff to Aria Kang.

## 2017-06-15 RX ORDER — OMEPRAZOLE 20 MG/1
CAPSULE, DELAYED RELEASE ORAL
Qty: 90 CAP | Refills: 0 | Status: SHIPPED | OUTPATIENT
Start: 2017-06-15 | End: 2017-08-23 | Stop reason: SDUPTHER

## 2017-07-06 ENCOUNTER — HOSPITAL ENCOUNTER (OUTPATIENT)
Dept: LAB | Facility: MEDICAL CENTER | Age: 77
End: 2017-07-06
Attending: NURSE PRACTITIONER
Payer: MEDICARE

## 2017-07-06 DIAGNOSIS — E78.00 PURE HYPERCHOLESTEROLEMIA: ICD-10-CM

## 2017-07-06 LAB
ALBUMIN SERPL BCP-MCNC: 3.8 G/DL (ref 3.2–4.9)
ALBUMIN/GLOB SERPL: 1.1 G/DL
ALP SERPL-CCNC: 170 U/L (ref 30–99)
ALT SERPL-CCNC: 23 U/L (ref 2–50)
ANION GAP SERPL CALC-SCNC: 9 MMOL/L (ref 0–11.9)
AST SERPL-CCNC: 28 U/L (ref 12–45)
BILIRUB SERPL-MCNC: 0.5 MG/DL (ref 0.1–1.5)
BUN SERPL-MCNC: 12 MG/DL (ref 8–22)
CALCIUM SERPL-MCNC: 8.9 MG/DL (ref 8.5–10.5)
CHLORIDE SERPL-SCNC: 107 MMOL/L (ref 96–112)
CHOLEST SERPL-MCNC: 172 MG/DL (ref 100–199)
CO2 SERPL-SCNC: 24 MMOL/L (ref 20–33)
CREAT SERPL-MCNC: 1.3 MG/DL (ref 0.5–1.4)
GFR SERPL CREATININE-BSD FRML MDRD: 54 ML/MIN/1.73 M 2
GLOBULIN SER CALC-MCNC: 3.4 G/DL (ref 1.9–3.5)
GLUCOSE SERPL-MCNC: 90 MG/DL (ref 65–99)
HDLC SERPL-MCNC: 28 MG/DL
LDLC SERPL CALC-MCNC: 101 MG/DL
POTASSIUM SERPL-SCNC: 4.2 MMOL/L (ref 3.6–5.5)
PROT SERPL-MCNC: 7.2 G/DL (ref 6–8.2)
SODIUM SERPL-SCNC: 140 MMOL/L (ref 135–145)
TRIGL SERPL-MCNC: 213 MG/DL (ref 0–149)

## 2017-07-06 PROCEDURE — 36415 COLL VENOUS BLD VENIPUNCTURE: CPT

## 2017-07-06 PROCEDURE — 80053 COMPREHEN METABOLIC PANEL: CPT

## 2017-07-06 PROCEDURE — 80061 LIPID PANEL: CPT

## 2017-07-20 ENCOUNTER — OFFICE VISIT (OUTPATIENT)
Dept: CARDIOLOGY | Facility: MEDICAL CENTER | Age: 77
End: 2017-07-20
Payer: MEDICARE

## 2017-07-20 VITALS
HEART RATE: 70 BPM | WEIGHT: 200 LBS | RESPIRATION RATE: 14 BRPM | DIASTOLIC BLOOD PRESSURE: 60 MMHG | BODY MASS INDEX: 27.09 KG/M2 | HEIGHT: 72 IN | SYSTOLIC BLOOD PRESSURE: 102 MMHG | OXYGEN SATURATION: 93 %

## 2017-07-20 DIAGNOSIS — I25.10 CORONARY ARTERY DISEASE INVOLVING NATIVE CORONARY ARTERY OF NATIVE HEART WITHOUT ANGINA PECTORIS: ICD-10-CM

## 2017-07-20 DIAGNOSIS — I10 ESSENTIAL HYPERTENSION: ICD-10-CM

## 2017-07-20 DIAGNOSIS — I50.22 CHRONIC SYSTOLIC HEART FAILURE (HCC): ICD-10-CM

## 2017-07-20 DIAGNOSIS — E78.2 MIXED HYPERLIPIDEMIA: Primary | ICD-10-CM

## 2017-07-20 DIAGNOSIS — F17.200 TOBACCO USE DISORDER: ICD-10-CM

## 2017-07-20 PROCEDURE — 99214 OFFICE O/P EST MOD 30 MIN: CPT | Performed by: NURSE PRACTITIONER

## 2017-07-20 RX ORDER — ATORVASTATIN CALCIUM 80 MG/1
80 TABLET, FILM COATED ORAL EVERY EVENING
Qty: 90 TAB | Refills: 3 | Status: SHIPPED | OUTPATIENT
Start: 2017-07-20

## 2017-07-20 RX ORDER — EZETIMIBE 10 MG/1
10 TABLET ORAL DAILY
Qty: 90 TAB | Refills: 3 | Status: SHIPPED | OUTPATIENT
Start: 2017-07-20 | End: 2017-11-24

## 2017-07-20 ASSESSMENT — ENCOUNTER SYMPTOMS
SHORTNESS OF BREATH: 1
ORTHOPNEA: 0
ABDOMINAL PAIN: 0
PND: 0
CLAUDICATION: 0
MYALGIAS: 0
PALPITATIONS: 0
COUGH: 0
WEAKNESS: 0
DIZZINESS: 0

## 2017-07-20 NOTE — MR AVS SNAPSHOT
"        Galileo Torrez   2017 7:40 AM   Office Visit   MRN: [id number]    Department:  [facility]   Dept Phone:  [phone]    Description:  Male : 1940   Provider:  FELIX Villanueva           Reason for Visit     Follow-Up           Allergies as of 2017     Allergen Noted Reactions    Neosporin Pain Relieving 2016   Unspecified    Pt states \"I get blisters\".      You were diagnosed with     Mixed hyperlipidemia   [272.2.ICD-9-CM]  -  Primary     Coronary artery disease involving native coronary artery of native heart without angina pectoris   [9072598]       Essential hypertension   [1960159]       Chronic systolic heart failure (CMS-HCC)   [428.22.ICD-9-CM]       Tobacco use disorder   [305.1.ICD-9-CM]         Vital Signs     Blood Pressure Pulse Respirations Height Weight Body Mass Index    102/60 mmHg 70 14 1.829 m (6' 0.01\") 90.719 kg (200 lb) 27.12 kg/m2    Oxygen Saturation Smoking Status                93% Current Every Day Smoker          Basic Information     Date Of Birth Sex Race Ethnicity Preferred Language    1940 Male White Non- English      Problem List              ICD-10-CM Priority Class Noted - Resolved    Abnormality of gait R26.9 Low  Unknown - Present    Coronary artery disease involving native coronary artery without angina pectoris I25.10 Medium  Unknown - Present    Atherosclerosis of renal artery (CMS-HCC) I70.1 Medium  Unknown - Present    Tobacco use disorder F17.200 Medium  Unknown - Present    Insomnia G47.00 Low  Unknown - Present    Chronic systolic heart failure (CMS-HCC) I50.22 Medium  Unknown - Present    Essential hypertension I10 Medium  Unknown - Present    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 Low  Unknown - Present    Carotid artery stenosis I65.29 Medium  2012 - Present    Rhabdomyolysis M62.82 Medium  10/15/2014 - Present    Alcohol abuse F10.10 Medium  10/15/2014 - Present    Frequent falls R29.6 High "  10/25/2014 - Present    Normocytic anemia D64.9 Low  12/11/2014 - Present    Cellulitis and abscess of leg, except foot L02.419, L03.119 Medium  3/11/2015 - Present    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 High  3/24/2015 - Present    Shortness of breath R06.02 Medium  5/15/2015 - Present    Aortic insufficiency I35.1   9/25/2015 - Present    Pulmonary HTN (CMS-HCC) I27.2   9/25/2015 - Present    COPD (chronic obstructive pulmonary disease) (CMS-HCC) J44.9   9/25/2015 - Present    Metabolic encephalopathy G93.41   10/5/2015 - Present    Long QT interval R94.31   10/7/2015 - Present    Deep vein thrombosis [I82.409] I82.409   10/22/2015 - Present    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   11/10/2015 - Present    Peptic ulcer disease K27.9   11/19/2015 - Present    Peripheral arterial disease (CMS-HCC) I73.9   12/21/2015 - Present    Wound infection T14.8, L08.9   4/4/2016 - Present    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   5/11/2016 - Present    Pain, phantom limb (CMS-HCC) G54.6   7/26/2016 - Present    Squamous cell carcinoma of skin of left ear and external auricular canal C44.229   6/8/2017 - Present    Mixed hyperlipidemia E78.2   7/20/2017 - Present      Health Maintenance        Date Due Completion Dates    IMM DTaP/Tdap/Td Vaccine (1 - Tdap) 11/24/1961 ---    IMM ZOSTER VACCINE 11/24/2002 ---    IMM INFLUENZA (1) 9/1/2017 10/12/2016, 10/4/2015, 10/1/2014, 10/5/2011    COLONOSCOPY 3/28/2022 3/28/2017            Current Immunizations     13-VALENT PCV PREVNAR 10/4/2015  1:37 AM    Influenza TIV (IM) 10/1/2014, 10/5/2011  9:00 PM    Influenza Vaccine Adult HD 10/12/2016, 10/4/2015  1:41 AM    Pneumococcal Vaccine (UF)Historical Data 3/4/2010  3:49 PM    Pneumococcal polysaccharide vaccine (PPSV-23) 10/6/2011  9:48 AM, 3/4/2010  3:40 PM      Below and/or attached are the medications your provider expects you to take. Review all of your home medications and newly ordered  medications with your provider and/or pharmacist. Follow medication instructions as directed by your provider and/or pharmacist. Please keep your medication list with you and share with your provider. Update the information when medications are discontinued, doses are changed, or new medications (including over-the-counter products) are added; and carry medication information at all times in the event of emergency situations     Allergies:  NEOSPORIN PAIN RELIEVING - Unspecified               Medications  Valid as of: July 20, 2017 -  8:28 AM    Generic Name Brand Name Tablet Size Instructions for use    Amitriptyline HCl (Tab) ELAVIL 150 MG Take 2 Tabs by mouth every bedtime. Indications: Trouble Sleeping        Aspirin (Tablet Delayed Response) ECOTRIN 81 MG Take 81 mg by mouth every morning. Indications: Heart Attack        Atorvastatin Calcium (Tab) LIPITOR 80 MG Take 1 Tab by mouth every evening.        Clopidogrel Bisulfate (Tab) PLAVIX 75 MG TAKE 1 TABLET BY MOUTH EVERY DAY        Ezetimibe (Tab) ZETIA 10 MG Take 1 Tab by mouth every day.        Gabapentin (Cap) NEURONTIN 300 MG Take 2 Caps by mouth every bedtime.        Metoprolol Tartrate (Tab) LOPRESSOR 25 MG Take 2 Tabs by mouth 2 times a day.        Omeprazole (CAPSULE DELAYED RELEASE) PRILOSEC 20 MG TAKE 1 CAPSULE BY MOUTH EVERY DAY        RaNITidine HCl (Tab) ZANTAC 150 MG Take 1 Tab by mouth 2 times a day.        .                 Medicines prescribed today [facility] sent to:     [facility] [address] [place] [address], [place] [address] [facility] [address]    [address] [facility] [address]    Phone: [phone] Fax: [phone]    Open 24 Hours?: No      Medication refill instructions:       If your prescription bottle indicates you have medication refills left, it is not necessary to call your provider’s office. Please contact your pharmacy and they will refill your medication.    If your prescription bottle indicates you do not have any  refills left, you may request refills at any time through one of the following ways: The online EMCAS system (except Urgent Care), by calling your provider’s office, or by asking your pharmacy to contact your provider’s office with a refill request. Medication refills are processed only during regular business hours and may not be available until the next business day. Your provider may request additional information or to have a follow-up visit with you prior to refilling your medication.   *Please Note: Medication refills are assigned a new Rx number when refilled electronically. Your pharmacy may indicate that no refills were authorized even though a new prescription for the same medication is available at the pharmacy. Please request the medicine by name with the pharmacy before contacting your provider for a refill.        Your To Do List     Future Labs/Procedures Complete By Expires    COMP METABOLIC PANEL  As directed 7/20/2018    LIPID PROFILE  As directed 7/20/2018         EMCAS Access Code: [email or web address]  Expires: 8/19/2017  8:28 AM    EMCAS  A secure, online tool to manage your health information     [employer]’s EMCAS® is a secure, online tool that connects you to your personalized health information from the privacy of your home -- day or night - making it very easy for you to manage your healthcare. Once the activation process is completed, you can even access your medical information using the EMCAS rajiv, which is available for free in the Apple Rajiv store or Google Play store.     EMCAS provides the following levels of access (as shown below):   My Chart Features   [facility] Doctor [facility]  Specialists [facility]  Urgent  Care [facility]  Primary Care  Doctor   Email your healthcare team securely and privately 24/7 X X X    Manage appointments: schedule your next appointment; view details of past/upcoming appointments X      Request prescription refills. X      View recent  personal medical records, including lab and immunizations X X X X   View health record, including health history, allergies, medications X X X X   Read reports about your outpatient visits, procedures, consult and ER notes X X X X   See your discharge summary, which is a recap of your hospital and/or ER visit that includes your diagnosis, lab results, and care plan. X X       How to register for Cloudbot:  1. Go to  [email or web address].  2. Click on the Sign Up Now box, which takes you to the New Member Sign Up page. You will need to provide the following information:  a. Enter your Cloudbot Access Code exactly as it appears at the top of this page. (You will not need to use this code after you’ve completed the sign-up process. If you do not sign up before the expiration date, you must request a new code.)   b. Enter your date of birth.   c. Enter your home email address.   d. Click Submit, and follow the next screen’s instructions.  3. Create a Cloudbot ID. This will be your Cloudbot login ID and cannot be changed, so think of one that is secure and easy to remember.  4. Create a Cloudbot password. You can change your password at any time.  5. Enter your Password Reset Question and Answer. This can be used at a later time if you forget your password.   6. Enter your e-mail address. This allows you to receive e-mail notifications when new information is available in Cloudbot.  7. Click Sign Up. You can now view your health information.    For assistance activating your Cloudbot account, call [phone]        Quit Tobacco Information     Do you want to quit using tobacco?    Quitting tobacco decreases risks of cancer, heart and lung disease, increases life expectancy, improves sense of taste and smell, and increases spending money, among other benefits.    If you are thinking about quitting, we can help.  • [facility] Quit Tobacco Program: 160.153.2032  o Program occurs weekly for four weeks and includes pharmacist  consultation on products to support quitting smoking or chewing tobacco. A provider referral is needed for pharmacist consultation.  • Tobacco Users Help Hotline: 3-800QUIT-NOW (648-2705) or https://[place].quitlogix.org/  o Free, confidential telephone and online coaching for [place] residents. Sessions are designed on a schedule that is convenient for you. Eligible clients receive free nicotine replacement therapy.  • Nationally: www.smokefree.gov  o Information and professional assistance to support both immediate and long-term needs as you become, and remain, a non-smoker. Smokefree.gov allows you to choose the help that best fits your needs.

## 2017-07-20 NOTE — Clinical Note
[facility]   [address] [place] [address]  KARINA Shell [address]  Phone: [phone]  Fax: [phone]              Galileo Torrez  1940    Encounter Date: 7/20/2017    FELIX Villanueva          PROGRESS NOTE:  Subjective:   Galileo Torrez is a 76 y.o. male who presents today to follow-up on hyperlipidemia, hypertension and coronary artery disease. 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    He was last seen by Dr Jordan in January at which time his blood pressure was elevated. Metoprolol was increased to 50 mg twice a day. Patient has not been checking his blood pressure. He denies dizziness or lightheadedness but is not very active. He does complain of shortness of breath when he gets up out of his wheelchair. He denies any chest tightness, heaviness or pressure. No palpitations.    His lipids have not been well controlled. He is on atorvastatin 80. His LDL goal is 70 or less.    Unfortunately he continues to smoke a pack a day. He tells me he is thinking about quitting. He has quit in the past for 6 months.    Past Medical History   Diagnosis Date   • Hypertension    • COPD    • Hyperlipidemia    • Upper GI bleed    • Fall      Multiple falls   • Abnormality of gait    • Transient alteration of awareness    • Atherosclerosis of renal artery (CMS-Former[facility])    • Unspecified disorder of kidney and ureter    • Umbilical hernia without mention of obstruction or gangrene    • Tobacco use disorder    • Insomnia, unspecified    • Peptic ulcer, unspecified site, unspecified as acute or chronic, without mention of hemorrhage, perforation, or obstruction    • Peripheral vascular disease, unspecified (CMS-Formerly [facility]) February 2012     PCI/stent (Omnilink 7 x 28mm) to the right external iliac.   • Aortic valve disorders    • Chronic systolic heart failure (CMS-Formerly [facility])    • Dizziness    • Pure  "hypercholesterolemia    • Unspecified hypertensive heart disease without heart failure    • Other specified forms of chronic ischemic heart disease    • Lumbago    • CAD (coronary artery disease) May 2009     LAD diffuse, 80% distal, %, OM1,%, Grafts all occluded LVEF 20%.  No targets for revascularization.   • Alcoholic polyneuropathy (CMS-HCC)    • Carotid artery stenosis August 2014     Carotid ultrasound with LICA 50-70% stenosis, YOLANDA <50% stenosis.  Unchanged from 2013.   • Dental disorder      dentures   • Cough    • Unspecified hemorrhagic conditions      blood thinners   • Blood clotting disorder (CMS-HCC) 10/15/15       left leg   • Breath shortness      says from smoking  No O2   • Heart burn    • Myocardial infarct (CMS-HCC) 1995x2     5 stents   • Bowel habit changes      constipation   • Hiatus hernia syndrome    • Snoring    • Stroke (CMS-HCC) 2011     \"I don't know when, somebody said I did. A little one\"     Past Surgical History   Procedure Laterality Date   • Gastroscopy-endo  3/3/2010     Performed by KALE JUAREZ at ENDOSCOPY [facility]   • Endostat  3/3/2010     Performed by KALE JUAREZ at ENDOSCOPY [facility] ORS   • Sclerotheraphy  3/3/2010     Performed by KALE JUAREZ at ENDOSCOPY [facility] ORS   • Cardiac cath, right/left heart       STENT   • Multiple coronary artery bypass  1994      CABG x 3, in [place]   • Recovery  2/28/2012     Performed by SURGERY, IR-RECOVERY at SURGERY SAME DAY [facility]   • Recovery  1/15/2015     Performed by Ir-Recovery Surgery at SURGERY [facility]   • Femoral popliteal bypass  2/11/2015     Performed by Lance Austin M.D. at SURGERY [facility]   • Other  1984     LEFT ORBIT FX-SURGERY PIN/PLATE   • Groin exploration  3/11/2015     Performed by Lance Austin M.D. at SURGERY [facility]   • Thrombectomy  3/24/2015     Performed by Lance Austin M.D. at SURGERY [facility]   • Angiogram  3/24/2015     " Performed by Lance Austin M.D. at [facility]   • Recovery  9/4/2015     Procedure: CATH LAB-[facility] W/COROS AND STENT [id number], ANGINA PECTORIS 413.9;  Surgeon: Recoveryonly Surgery;  Location: SURGERY PRE-POST PROC UNIT [facility];  Service:    • Femoral popliteal bypass Left 10/2/2015     Procedure: FEMORAL POPLITEAL BYPASS;  Surgeon: Lance Austin M.D.;  Location: [facility];  Service:    • Angiogram  10/2/2015     Procedure: ANGIOGRAM;  Surgeon: Lance Austin M.D.;  Location: [facility];  Service:    • Toe amputation Left 11/9/2015     Procedure: TOE AMPUTATION FIRST AND SECOND;  Surgeon: Lance Austin M.D.;  Location: [facility];  Service:    • Incision and drainage general Left 11/9/2015     Procedure: INCISION AND DRAINAGE GENERAL WASHOUT, & WOUND VAC PLACEMENT LEG  ;  Surgeon: Lance Austin M.D.;  Location: [facility];  Service:    • Femoral popliteal bypass Left 11/13/2015     Procedure: FEMORAL POPLITEAL ANGIOPLASTY cryo preserve vein placement  ;  Surgeon: Lance Austin M.D.;  Location: [facility];  Service:    • Angiogram Left 12/21/2015     Procedure: ANGIOGRAM, LEG;  Surgeon: Lance Austin M.D.;  Location: [facility];  Service:    • Femoral tibial bypass Left 12/21/2015     Procedure: FEMORAL TIBIAL BYPASS with Distaflo Graft;  Surgeon: Lance Austin M.D.;  Location: [facility];  Service:    • Knee amputation below Left 12/22/2015     Procedure: KNEE AMPUTATION BELOW , REMOVAL OF LEFT TIBIAL BYPASS GRAFT;  Surgeon: Lance Austin M.D.;  Location: [facility];  Service:    • Stump revision Left 5/11/2016     Procedure: STUMP REVISION BK DEBRIDEMENT;  Surgeon: Lance Austin M.D.;  Location: [facility];  Service:    • Split thickness skin graft Left 8/25/2016     Procedure: SPLIT THICKNESS SKIN GRAFT LEG;  Surgeon: Fuad Moreno M.D.;  Location: [facility]  "[facility];  Service:    • Mass excision general Left 6/8/2017     Procedure: MASS EXCISION GENERAL - SQUAMOUS CELL - EAR ;  Surgeon: Fuad Moreno M.D.;  Location: SURGERY SAME DAY [facility];  Service:    • Flap closure Left 6/8/2017     Procedure: FLAP CLOSURE;  Surgeon: Fuad Moreno M.D.;  Location: SURGERY SAME DAY [facility];  Service:      Family History   Problem Relation Age of Onset   • Heart Disease Mother    • Other Mother      brain surg   • Cancer Brother      melanoma     History   Smoking status   • Current Every Day Smoker -- 1.00 packs/day for 55 years   • Types: Cigarettes   Smokeless tobacco   • Never Used     Allergies   Allergen Reactions   • Neosporin Pain Relieving Unspecified     Pt states \"I get blisters\".     Outpatient Encounter Prescriptions as of 7/20/2017   Medication Sig Dispense Refill   • atorvastatin (LIPITOR) 80 MG tablet Take 1 Tab by mouth every evening. 90 Tab 3   • ezetimibe (ZETIA) 10 MG Tab Take 1 Tab by mouth every day. 90 Tab 3   • metoprolol (LOPRESSOR) 25 MG Tab Take 2 Tabs by mouth 2 times a day. 180 Tab 3   • clopidogrel (PLAVIX) 75 MG Tab TAKE 1 TABLET BY MOUTH EVERY DAY 90 Tab 3   • omeprazole (PRILOSEC) 20 MG delayed-release capsule TAKE 1 CAPSULE BY MOUTH EVERY DAY 90 Cap 0   • ranitidine (ZANTAC) 150 MG Tab Take 1 Tab by mouth 2 times a day. 60 Tab 5   • amitriptyline (ELAVIL) 150 MG Tab Take 2 Tabs by mouth every bedtime. Indications: Trouble Sleeping 180 Tab 3   • gabapentin (NEURONTIN) 300 MG Cap Take 2 Caps by mouth every bedtime. 180 Cap 3   • aspirin EC (ECOTRIN) 81 MG Tablet Delayed Response Take 81 mg by mouth every morning. Indications: Heart Attack     • [DISCONTINUED] clopidogrel (PLAVIX) 75 MG Tab Take 75 mg by mouth every bedtime.     • [DISCONTINUED] metoprolol (LOPRESSOR) 50 MG Tab Take 1 Tab by mouth 2 times a day. 180 Tab 3   • [DISCONTINUED] atorvastatin (LIPITOR) 80 MG tablet Take 1 Tab by mouth every evening. 90 Tab 3     No " "facility-administered encounter medications on file as of 7/20/2017.     Review of Systems   Constitutional: Negative for malaise/fatigue.   Respiratory: Positive for shortness of breath (exertion, especially getting out of wheel chair.). Negative for cough.    Cardiovascular: Negative for chest pain, palpitations, orthopnea, claudication, leg swelling and PND.   Gastrointestinal: Negative for abdominal pain.   Musculoskeletal: Negative for myalgias.        Left phantom limb pain.   Neurological: Negative for dizziness and weakness.        Objective:   /60 mmHg  Pulse 70  Resp 14  Ht 1.829 m (6' 0.01\")  Wt 90.719 kg (200 lb)  BMI 27.12 kg/m2  SpO2 93%    Physical Exam   Constitutional: He is oriented to person, place, and time. He appears well-developed and well-nourished.   HENT:   Head: Normocephalic.   Eyes: Conjunctivae are normal.   Neck: No JVD present. No thyromegaly present.   Cardiovascular: Normal rate, regular rhythm and normal heart sounds.    Pulmonary/Chest: Effort normal. He has decreased breath sounds (diminished throughout.. Rhonchi and rales in the bases of partially clear with cough.). He has no wheezes. He has no rales.   Abdominal: Soft. Bowel sounds are normal. He exhibits no distension. There is no tenderness.   Musculoskeletal: He exhibits no edema.   Left BKA   Neurological: He is alert and oriented to person, place, and time.   Skin: Skin is warm and dry.   Psychiatric: He has a normal mood and affect.     Results for KALE CHAN (MRN [id number]) as of 7/20/2017 07:57   Ref. Range 7/6/2017 07:17   Sodium Latest Ref Range: 135-145 mmol/L 140   Potassium Latest Ref Range: 3.6-5.5 mmol/L 4.2   Chloride Latest Ref Range:  mmol/L 107   Co2 Latest Ref Range: 20-33 mmol/L 24   Anion Gap Latest Ref Range: 0.0-11.9  9.0   Glucose Latest Ref Range: 65-99 mg/dL 90   Bun Latest Ref Range: 8-22 mg/dL 12   Creatinine Latest Ref Range: 0.50-1.40 mg/dL 1.30   GFR If African " American Latest Ref Range: >60 mL/min/1.73 m 2 >60   GFR If Non  Latest Ref Range: >60 mL/min/1.73 m 2 54 (A)   Calcium Latest Ref Range: 8.5-10.5 mg/dL 8.9   AST(SGOT) Latest Ref Range: 12-45 U/L 28   ALT(SGPT) Latest Ref Range: 2-50 U/L 23   Alkaline Phosphatase Latest Ref Range: 30-99 U/L 170 (H)   Total Bilirubin Latest Ref Range: 0.1-1.5 mg/dL 0.5   Albumin Latest Ref Range: 3.2-4.9 g/dL 3.8   Total Protein Latest Ref Range: 6.0-8.2 g/dL 7.2   Globulin Latest Ref Range: 1.9-3.5 g/dL 3.4   A-G Ratio Latest Units: g/dL 1.1   Cholesterol,Tot Latest Ref Range: 100-199 mg/dL 172   Triglycerides Latest Ref Range: 0-149 mg/dL 213 (H)   HDL Latest Ref Range: >=40 mg/dL 28 (A)   LDL Latest Ref Range: <100 mg/dL 101 (H)     Assessment:     1. Mixed hyperlipidemia  atorvastatin (LIPITOR) 80 MG tablet    ezetimibe (ZETIA) 10 MG Tab    COMP METABOLIC PANEL    LIPID PROFILE   2. Coronary artery disease involving native coronary artery of native heart without angina pectoris  metoprolol (LOPRESSOR) 25 MG Tab   3. Essential hypertension     4. Chronic systolic heart failure (CMS-[facility])     5. Tobacco use disorder         Medical Decision Making:  Today's Assessment / Status / Plan:     Hyperlipidemia: His lipids are not to goal. I have counseled him on reducing total calories due to his elevated triglycerides. He tells me that he does not eat much. I will would like him to reduce his carbs and sweets. His HDL is low which is probably due to his smoking and lack of exercise. His LDL is still slightly high. I will add Zetia 10 mg and we will check lipids and metabolic panel in 3 months.    Coronary artery disease: No anginal symptoms although he is not very active. Continue aspirin and Plavix.    Hypertension: His blood pressure was low on recheck at 102 systolic. I checked his other arm and it was comparable. Since he has vascular disease I do not want his blood pressure too low. His blood pressure ranges 120-140  systolic. I will have him return to metoprolol 25 mg twice a day.    Systolic heart failure: No fluid overload signs today. He is not on any diuretics.    Tobacco use: Patient has thought about quitting. We talked about changing the pattern surrounding the cigarettes. He feels that he can just stop smoking at some point. He is encouraged in his efforts.    He will follow-up in 3 months with Dr Jordan with lab work prior. He will follow-up sooner if problems.    Collaborating Provider: Dr. Chen.        No Recipients

## 2017-07-20 NOTE — PROGRESS NOTES
Subjective:   Galileo Torrez is a 76 y.o. male who presents today to follow-up on hyperlipidemia, hypertension and coronary artery disease. 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    He was last seen by Dr Jordan in January at which time his blood pressure was elevated. Metoprolol was increased to 50 mg twice a day. Patient has not been checking his blood pressure. He denies dizziness or lightheadedness but is not very active. He does complain of shortness of breath when he gets up out of his wheelchair. He denies any chest tightness, heaviness or pressure. No palpitations.    His lipids have not been well controlled. He is on atorvastatin 80. His LDL goal is 70 or less.    Unfortunately he continues to smoke a pack a day. He tells me he is thinking about quitting. He has quit in the past for 6 months.    Past Medical History   Diagnosis Date   • Hypertension    • COPD    • Hyperlipidemia    • Upper GI bleed    • Fall      Multiple falls   • Abnormality of gait    • Transient alteration of awareness    • Atherosclerosis of renal artery (CMS-[facility])    • Unspecified disorder of kidney and ureter    • Umbilical hernia without mention of obstruction or gangrene    • Tobacco use disorder    • Insomnia, unspecified    • Peptic ulcer, unspecified site, unspecified as acute or chronic, without mention of hemorrhage, perforation, or obstruction    • Peripheral vascular disease, unspecified (CMS-[facility]) February 2012     PCI/stent (Omnilink 7 x 28mm) to the right external iliac.   • Aortic valve disorders    • Chronic systolic heart failure (CMS-[facility])    • Dizziness    • Pure hypercholesterolemia    • Unspecified hypertensive heart disease without heart failure    • Other specified forms of chronic ischemic heart disease    • Lumbago    • CAD (coronary artery disease) May 2009     LAD diffuse, 80% distal, %, OM1,%, Grafts all occluded LVEF 20%.  No  "targets for revascularization.   • Alcoholic polyneuropathy (CMS-[facility])    • Carotid artery stenosis August 2014     Carotid ultrasound with LICA 50-70% stenosis, YOLANDA <50% stenosis.  Unchanged from 2013.   • Dental disorder      dentures   • Cough    • Unspecified hemorrhagic conditions      blood thinners   • Blood clotting disorder (CMS-[facility]) 10/15/15       left leg   • Breath shortness      says from smoking  No O2   • Heart burn    • Myocardial infarct (CMS-[facility]) 1995x2     5 stents   • Bowel habit changes      constipation   • Hiatus hernia syndrome    • Snoring    • Stroke (CMS-[facility]) 2011     \"I don't know when, somebody said I did. A little one\"     Past Surgical History   Procedure Laterality Date   • Gastroscopy-endo  3/3/2010     Performed by KALE JUAREZ at ENDOSCOPY [facility]   • Endostat  3/3/2010     Performed by KALE JUAREZ at ENDOSCOPY [facility]   • Sclerotheraphy  3/3/2010     Performed by KALE JUAREZ at ENDOSCOPY [facility]   • Cardiac cath, right/left heart       STENT   • Multiple coronary artery bypass  1994      CABG x 3, in [place]   • Recovery  2/28/2012     Performed by SURGERY, IR-RECOVERY at SURGERY SAME DAY [facility]   • Recovery  1/15/2015     Performed by Ir-Recovery Surgery at SURGERY [facility]   • Femoral popliteal bypass  2/11/2015     Performed by Lance Austin M.D. at SURGERY [facility]   • Other  1984     LEFT ORBIT FX-SURGERY PIN/PLATE   • Groin exploration  3/11/2015     Performed by Lance Austin M.D. at SURGERY [facility]   • Thrombectomy  3/24/2015     Performed by Lance Austin M.D. at SURGERY [facility]   • Angiogram  3/24/2015     Performed by Lance Austin M.D. at SURGERY [facility]   • Recovery  9/4/2015     Procedure: CATH LAB-[facility] W/COROS AND STENT [id number], ANGINA PECTORIS 413.9;  Surgeon: Recoveryonly Surgery;  Location: SURGERY PRE-POST PROC UNIT [facility];  Service:    • Femoral popliteal " bypass Left 10/2/2015     Procedure: FEMORAL POPLITEAL BYPASS;  Surgeon: Lance Austin M.D.;  Location: [facility];  Service:    • Angiogram  10/2/2015     Procedure: ANGIOGRAM;  Surgeon: Lance Austin M.D.;  Location: [facility];  Service:    • Toe amputation Left 11/9/2015     Procedure: TOE AMPUTATION FIRST AND SECOND;  Surgeon: Lance Austin M.D.;  Location: [facility];  Service:    • Incision and drainage general Left 11/9/2015     Procedure: INCISION AND DRAINAGE GENERAL WASHOUT, & WOUND VAC PLACEMENT LEG  ;  Surgeon: Lance Austin M.D.;  Location: [facility];  Service:    • Femoral popliteal bypass Left 11/13/2015     Procedure: FEMORAL POPLITEAL ANGIOPLASTY cryo preserve vein placement  ;  Surgeon: Lance Austin M.D.;  Location: [facility];  Service:    • Angiogram Left 12/21/2015     Procedure: ANGIOGRAM, LEG;  Surgeon: Lance Austin M.D.;  Location: [facility];  Service:    • Femoral tibial bypass Left 12/21/2015     Procedure: FEMORAL TIBIAL BYPASS with Distaflo Graft;  Surgeon: Lance Austin M.D.;  Location: [facility];  Service:    • Knee amputation below Left 12/22/2015     Procedure: KNEE AMPUTATION BELOW , REMOVAL OF LEFT TIBIAL BYPASS GRAFT;  Surgeon: Lance Austin M.D.;  Location: [facility];  Service:    • Stump revision Left 5/11/2016     Procedure: STUMP REVISION BK DEBRIDEMENT;  Surgeon: Lance Austin M.D.;  Location: [facility];  Service:    • Split thickness skin graft Left 8/25/2016     Procedure: SPLIT THICKNESS SKIN GRAFT LEG;  Surgeon: Fuad Moreno M.D.;  Location: [facility];  Service:    • Mass excision general Left 6/8/2017     Procedure: MASS EXCISION GENERAL - SQUAMOUS CELL - EAR ;  Surgeon: Fuad Moreno M.D.;  Location: SURGERY SAME DAY [facility];  Service:    • Flap closure Left 6/8/2017     Procedure: FLAP CLOSURE;  Surgeon: Fuad Moreno  "M.D.;  Location: SURGERY SAME DAY [facility];  Service:      Family History   Problem Relation Age of Onset   • Heart Disease Mother    • Other Mother      brain surg   • Cancer Brother      melanoma     History   Smoking status   • Current Every Day Smoker -- 1.00 packs/day for 55 years   • Types: Cigarettes   Smokeless tobacco   • Never Used     Allergies   Allergen Reactions   • Neosporin Pain Relieving Unspecified     Pt states \"I get blisters\".     Outpatient Encounter Prescriptions as of 7/20/2017   Medication Sig Dispense Refill   • atorvastatin (LIPITOR) 80 MG tablet Take 1 Tab by mouth every evening. 90 Tab 3   • ezetimibe (ZETIA) 10 MG Tab Take 1 Tab by mouth every day. 90 Tab 3   • metoprolol (LOPRESSOR) 25 MG Tab Take 2 Tabs by mouth 2 times a day. 180 Tab 3   • clopidogrel (PLAVIX) 75 MG Tab TAKE 1 TABLET BY MOUTH EVERY DAY 90 Tab 3   • omeprazole (PRILOSEC) 20 MG delayed-release capsule TAKE 1 CAPSULE BY MOUTH EVERY DAY 90 Cap 0   • ranitidine (ZANTAC) 150 MG Tab Take 1 Tab by mouth 2 times a day. 60 Tab 5   • amitriptyline (ELAVIL) 150 MG Tab Take 2 Tabs by mouth every bedtime. Indications: Trouble Sleeping 180 Tab 3   • gabapentin (NEURONTIN) 300 MG Cap Take 2 Caps by mouth every bedtime. 180 Cap 3   • aspirin EC (ECOTRIN) 81 MG Tablet Delayed Response Take 81 mg by mouth every morning. Indications: Heart Attack     • [DISCONTINUED] clopidogrel (PLAVIX) 75 MG Tab Take 75 mg by mouth every bedtime.     • [DISCONTINUED] metoprolol (LOPRESSOR) 50 MG Tab Take 1 Tab by mouth 2 times a day. 180 Tab 3   • [DISCONTINUED] atorvastatin (LIPITOR) 80 MG tablet Take 1 Tab by mouth every evening. 90 Tab 3     No facility-administered encounter medications on file as of 7/20/2017.     Review of Systems   Constitutional: Negative for malaise/fatigue.   Respiratory: Positive for shortness of breath (exertion, especially getting out of wheel chair.). Negative for cough.    Cardiovascular: Negative for chest pain, " "palpitations, orthopnea, claudication, leg swelling and PND.   Gastrointestinal: Negative for abdominal pain.   Musculoskeletal: Negative for myalgias.        Left phantom limb pain.   Neurological: Negative for dizziness and weakness.        Objective:   /60 mmHg  Pulse 70  Resp 14  Ht 1.829 m (6' 0.01\")  Wt 90.719 kg (200 lb)  BMI 27.12 kg/m2  SpO2 93%    Physical Exam   Constitutional: He is oriented to person, place, and time. He appears well-developed and well-nourished.   HENT:   Head: Normocephalic.   Eyes: Conjunctivae are normal.   Neck: No JVD present. No thyromegaly present.   Cardiovascular: Normal rate, regular rhythm and normal heart sounds.    Pulmonary/Chest: Effort normal. He has decreased breath sounds (diminished throughout.. Rhonchi and rales in the bases of partially clear with cough.). He has no wheezes. He has no rales.   Abdominal: Soft. Bowel sounds are normal. He exhibits no distension. There is no tenderness.   Musculoskeletal: He exhibits no edema.   Left BKA   Neurological: He is alert and oriented to person, place, and time.   Skin: Skin is warm and dry.   Psychiatric: He has a normal mood and affect.     Results for KALE CHAN (MRN [id number]) as of 7/20/2017 07:57   Ref. Range 7/6/2017 07:17   Sodium Latest Ref Range: 135-145 mmol/L 140   Potassium Latest Ref Range: 3.6-5.5 mmol/L 4.2   Chloride Latest Ref Range:  mmol/L 107   Co2 Latest Ref Range: 20-33 mmol/L 24   Anion Gap Latest Ref Range: 0.0-11.9  9.0   Glucose Latest Ref Range: 65-99 mg/dL 90   Bun Latest Ref Range: 8-22 mg/dL 12   Creatinine Latest Ref Range: 0.50-1.40 mg/dL 1.30   GFR If  Latest Ref Range: >60 mL/min/1.73 m 2 >60   GFR If Non  Latest Ref Range: >60 mL/min/1.73 m 2 54 (A)   Calcium Latest Ref Range: 8.5-10.5 mg/dL 8.9   AST(SGOT) Latest Ref Range: 12-45 U/L 28   ALT(SGPT) Latest Ref Range: 2-50 U/L 23   Alkaline Phosphatase Latest Ref Range: 30-99 " U/L 170 (H)   Total Bilirubin Latest Ref Range: 0.1-1.5 mg/dL 0.5   Albumin Latest Ref Range: 3.2-4.9 g/dL 3.8   Total Protein Latest Ref Range: 6.0-8.2 g/dL 7.2   Globulin Latest Ref Range: 1.9-3.5 g/dL 3.4   A-G Ratio Latest Units: g/dL 1.1   Cholesterol,Tot Latest Ref Range: 100-199 mg/dL 172   Triglycerides Latest Ref Range: 0-149 mg/dL 213 (H)   HDL Latest Ref Range: >=40 mg/dL 28 (A)   LDL Latest Ref Range: <100 mg/dL 101 (H)     Assessment:     1. Mixed hyperlipidemia  atorvastatin (LIPITOR) 80 MG tablet    ezetimibe (ZETIA) 10 MG Tab    COMP METABOLIC PANEL    LIPID PROFILE   2. Coronary artery disease involving native coronary artery of native heart without angina pectoris  metoprolol (LOPRESSOR) 25 MG Tab   3. Essential hypertension     4. Chronic systolic heart failure (CMS-[facility])     5. Tobacco use disorder         Medical Decision Making:  Today's Assessment / Status / Plan:     Hyperlipidemia: His lipids are not to goal. I have counseled him on reducing total calories due to his elevated triglycerides. He tells me that he does not eat much. I will would like him to reduce his carbs and sweets. His HDL is low which is probably due to his smoking and lack of exercise. His LDL is still slightly high. I will add Zetia 10 mg and we will check lipids and metabolic panel in 3 months.    Coronary artery disease: No anginal symptoms although he is not very active. Continue aspirin and Plavix.    Hypertension: His blood pressure was low on recheck at 102 systolic. I checked his other arm and it was comparable. Since he has vascular disease I do not want his blood pressure too low. His blood pressure ranges 120-140 systolic. I will have him return to metoprolol 25 mg twice a day.    Systolic heart failure: No fluid overload signs today. He is not on any diuretics.    Tobacco use: Patient has thought about quitting. We talked about changing the pattern surrounding the cigarettes. He feels that he can just stop  smoking at some point. He is encouraged in his efforts.    He will follow-up in 3 months with Dr Jordan with lab work prior. He will follow-up sooner if problems.    Collaborating Provider: Dr. Chen.

## 2017-08-22 DIAGNOSIS — I10 ESSENTIAL HYPERTENSION: ICD-10-CM

## 2017-08-22 RX ORDER — RANITIDINE 150 MG/1
TABLET ORAL
Qty: 60 TAB | Refills: 3 | Status: SHIPPED | OUTPATIENT
Start: 2017-08-22 | End: 2017-08-22 | Stop reason: SDUPTHER

## 2017-08-23 ENCOUNTER — TELEPHONE (OUTPATIENT)
Dept: VASCULAR LAB | Facility: MEDICAL CENTER | Age: 77
End: 2017-08-23

## 2017-08-23 DIAGNOSIS — G62.9 NEUROPATHY: ICD-10-CM

## 2017-08-23 RX ORDER — RANITIDINE 150 MG/1
TABLET ORAL
Qty: 180 TAB | Refills: 0 | Status: SHIPPED | OUTPATIENT
Start: 2017-08-23 | End: 2017-09-07 | Stop reason: SDUPTHER

## 2017-08-23 RX ORDER — OMEPRAZOLE 20 MG/1
20 CAPSULE, DELAYED RELEASE ORAL
Qty: 90 CAP | Refills: 0 | Status: SHIPPED | OUTPATIENT
Start: 2017-08-23 | End: 2017-09-07 | Stop reason: SDUPTHER

## 2017-08-23 RX ORDER — GABAPENTIN 300 MG/1
600 CAPSULE ORAL
Qty: 180 CAP | Refills: 0 | Status: SHIPPED | OUTPATIENT
Start: 2017-08-23 | End: 2017-11-09 | Stop reason: SDUPTHER

## 2017-08-23 NOTE — TELEPHONE ENCOUNTER
Refilled x 3 months. Please advise patient to schedule follow-up for future fills.   Snehal Ochoa, PHARMD

## 2017-09-07 ENCOUNTER — OFFICE VISIT (OUTPATIENT)
Dept: MEDICAL GROUP | Facility: PHYSICIAN GROUP | Age: 77
End: 2017-09-07
Payer: MEDICARE

## 2017-09-07 VITALS
RESPIRATION RATE: 20 BRPM | HEART RATE: 85 BPM | HEIGHT: 72 IN | DIASTOLIC BLOOD PRESSURE: 68 MMHG | TEMPERATURE: 98 F | OXYGEN SATURATION: 92 % | BODY MASS INDEX: 27.09 KG/M2 | SYSTOLIC BLOOD PRESSURE: 122 MMHG | WEIGHT: 200 LBS

## 2017-09-07 DIAGNOSIS — Z23 NEED FOR VACCINATION: ICD-10-CM

## 2017-09-07 DIAGNOSIS — G62.9 NEUROPATHY: ICD-10-CM

## 2017-09-07 DIAGNOSIS — K27.9 PEPTIC ULCER DISEASE: ICD-10-CM

## 2017-09-07 DIAGNOSIS — I10 ESSENTIAL HYPERTENSION: ICD-10-CM

## 2017-09-07 PROCEDURE — 90662 IIV NO PRSV INCREASED AG IM: CPT | Performed by: NURSE PRACTITIONER

## 2017-09-07 PROCEDURE — 99214 OFFICE O/P EST MOD 30 MIN: CPT | Mod: 25 | Performed by: NURSE PRACTITIONER

## 2017-09-07 PROCEDURE — G0008 ADMIN INFLUENZA VIRUS VAC: HCPCS | Performed by: NURSE PRACTITIONER

## 2017-09-07 RX ORDER — OMEPRAZOLE 20 MG/1
20 CAPSULE, DELAYED RELEASE ORAL
Qty: 90 CAP | Refills: 3 | Status: SHIPPED | OUTPATIENT
Start: 2017-09-07

## 2017-09-07 RX ORDER — RANITIDINE 150 MG/1
150 TABLET ORAL 2 TIMES DAILY
Qty: 180 TAB | Refills: 3 | Status: SHIPPED | OUTPATIENT
Start: 2017-09-07

## 2017-09-07 ASSESSMENT — PATIENT HEALTH QUESTIONNAIRE - PHQ9: CLINICAL INTERPRETATION OF PHQ2 SCORE: 0

## 2017-09-18 PROBLEM — Z23 NEED FOR VACCINATION: Status: ACTIVE | Noted: 2017-09-18

## 2017-09-18 NOTE — PROGRESS NOTES
Chief Complaint   Patient presents with   • Annual Exam       HISTORY OF PRESENT ILLNESS: Patient is a 76 y.o. male established patient who presents today to discuss the following issues:    Essential hypertension  Chronic in nature.  Stable on meds.    Peptic ulcer disease  Stable on meds.  Needs refills of prilsoec and zantac.    Need for vaccination  Would like a flu shot today.      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     Priority: High   • Frequent falls 10/25/2014     Priority: High   • Shortness of breath 05/15/2015     Priority: Medium   • Cellulitis and abscess of leg, except foot 03/11/2015     Priority: Medium   • Rhabdomyolysis 10/15/2014     Priority: Medium   • Alcohol abuse 10/15/2014     Priority: Medium   • Carotid artery stenosis 07/11/2012     Priority: Medium   • Atherosclerosis of renal artery (CMS-HCC)      Priority: Medium   • Tobacco use disorder      Priority: Medium   • Chronic systolic heart failure (CMS-HCC)      Priority: Medium   • Essential hypertension      Priority: Medium   • Coronary artery disease involving native coronary artery without angina pectoris      Priority: Medium   • Normocytic anemia 12/11/2014     Priority: Low   • Alcoholic polyneuropathy (CMS-HCC)      Priority: Low   • Insomnia      Priority: Low   • Abnormality of gait      Priority: Low   • Need for vaccination 09/18/2017   • Mixed hyperlipidemia 07/20/2017   • Squamous cell carcinoma of skin of left ear and external auricular canal 06/08/2017   • Pain, phantom limb (CMS-HCC) 07/26/2016   • Amputation stump necrosis (CMS-HCC) 05/11/2016   • Wound infection 04/04/2016   • Peripheral arterial disease (CMS-HCC) 12/21/2015   • Peptic ulcer disease 11/19/2015   • 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 180 Tab 3   • omeprazole (PRILOSEC) 20 MG delayed-release capsule Take 1 Cap by mouth every day. 90 Cap 3   • gabapentin (NEURONTIN) 300 MG Cap Take 2 Caps by mouth every bedtime. 180 Cap 0   • atorvastatin (LIPITOR) 80 MG tablet Take 1 Tab by mouth every evening. 90 Tab 3   • ezetimibe (ZETIA) 10 MG Tab Take 1 Tab by mouth every day. 90 Tab 3   • metoprolol (LOPRESSOR) 25 MG Tab Take 2 Tabs by mouth 2 times a day. 180 Tab 3   • clopidogrel (PLAVIX) 75 MG Tab TAKE 1 TABLET BY MOUTH EVERY DAY 90 Tab 3   • amitriptyline (ELAVIL) 150 MG Tab Take 2 Tabs by mouth every bedtime. Indications: Trouble Sleeping 180 Tab 3   • aspirin EC (ECOTRIN) 81 MG Tablet Delayed Response Take 81 mg by mouth every morning. Indications: Heart Attack       No current facility-administered medications for this visit.        Social History   Substance Use Topics   • Smoking status: Current Every Day Smoker     Packs/day: 1.00     Years: 55.00     Types: Cigarettes   • Smokeless tobacco: Never Used   • Alcohol use No       Family Status   Relation Status   • Mother  at age 54   • Brother    • Father  at age 80     of \"old age\"     Family History   Problem Relation Age of Onset   • Heart Disease Mother    • Other Mother      brain surg   • Cancer Brother      melanoma       Review of Systems:   Constitutional: Negative for fever, chills, weight loss and malaise/fatigue.   HENT: Negative for ear pain, nosebleeds, congestion, sore throat and neck pain.    Eyes: Negative for blurred vision.   Respiratory: Negative for cough, sputum production, shortness of breath and wheezing.    Cardiovascular: Negative " for chest pain, palpitations, orthopnea and leg swelling.   Gastrointestinal: Negative for heartburn, nausea, vomiting and abdominal pain.   Genitourinary: Negative for dysuria, urgency and frequency.   Musculoskeletal: Negative for myalgias, joint pain, and back pain.  Skin: Negative for rash and itching.   Neurological: Negative for dizziness, tingling, tremors, sensory change, focal weakness and headaches.   Endo/Heme/Allergies: Does not bruise/bleed easily.   Psychiatric/Behavioral: Negative for depression, suicidal ideas and memory loss.  The patient is not nervous/anxious and does not have insomnia.    All other systems reviewed and are negative except as in HPI.    Exam:  Blood pressure 122/68, pulse 85, temperature 36.7 °C (98 °F), resp. rate 20, height 1.829 m (6'), weight 90.7 kg (200 lb), SpO2 92 %.  General:  Well nourished, well developed male in NAD  Head: Grossly normal.  Neck: Supple without JVD or bruit. Thyroid is not enlarged.  Pulmonary: Clear to ausculation. Normal effort. No rales, ronchi, or wheezing.  Cardiovascular: Regular rate and rhythm without murmur.   Extremities: No clubbing, cyanosis, or edema.  Skin: Intact with no obvious rashes or lesions.  Neuro: Grossly intact.  Psych: Alert and oriented x 3.  Mood and affect appropriate.    Medical decision-making and discussion: Galileo is here today to discuss a few issues.  His medications were refilled, and he was given a flu shot.  He will follow-up here as needed.    I have placed the below orders and discussed them with an approved delegating provider. The MA is performing the below orders under the direction of Dr. Wise, who have provided verbal consent for supervision.            Assessment/Plan:  1. Essential hypertension  ranitidine (ZANTAC) 150 MG Tab   2. Neuropathy (CMS-HCC)  omeprazole (PRILOSEC) 20 MG delayed-release capsule   3. Need for vaccination  influenza vaccine high-dose 0.5 ML Suspension Prefilled Syringe injection     INFLUENZA VACCINE, HIGH DOSE (65+ ONLY)   4. Peptic ulcer disease         Return if symptoms worsen or fail to improve.    Please note that this dictation was created using voice recognition software. I have made every reasonable attempt to correct obvious errors, but I expect that there are errors of grammar and possibly content that I did not discover before finalizing the note.

## 2017-11-09 ENCOUNTER — OFFICE VISIT (OUTPATIENT)
Dept: CARDIOLOGY | Facility: MEDICAL CENTER | Age: 77
End: 2017-11-09
Payer: MEDICARE

## 2017-11-09 VITALS
BODY MASS INDEX: 28.44 KG/M2 | WEIGHT: 210 LBS | SYSTOLIC BLOOD PRESSURE: 148 MMHG | HEIGHT: 72 IN | OXYGEN SATURATION: 90 % | HEART RATE: 98 BPM | DIASTOLIC BLOOD PRESSURE: 64 MMHG

## 2017-11-09 DIAGNOSIS — G54.6 PAIN, PHANTOM LIMB (HCC): ICD-10-CM

## 2017-11-09 DIAGNOSIS — I25.10 CORONARY ARTERY DISEASE INVOLVING NATIVE CORONARY ARTERY OF NATIVE HEART WITHOUT ANGINA PECTORIS: ICD-10-CM

## 2017-11-09 DIAGNOSIS — G62.9 NEUROPATHY: ICD-10-CM

## 2017-11-09 DIAGNOSIS — I10 ESSENTIAL HYPERTENSION: ICD-10-CM

## 2017-11-09 DIAGNOSIS — E78.2 MIXED HYPERLIPIDEMIA: ICD-10-CM

## 2017-11-09 DIAGNOSIS — G62.1 ALCOHOLIC POLYNEUROPATHY (HCC): ICD-10-CM

## 2017-11-09 PROCEDURE — 99214 OFFICE O/P EST MOD 30 MIN: CPT | Performed by: INTERNAL MEDICINE

## 2017-11-09 RX ORDER — GABAPENTIN 300 MG/1
600 CAPSULE ORAL
Qty: 180 CAP | Refills: 0 | Status: SHIPPED | OUTPATIENT
Start: 2017-11-09 | End: 2018-02-26 | Stop reason: SDUPTHER

## 2017-11-09 RX ORDER — AMITRIPTYLINE HYDROCHLORIDE 150 MG/1
300 TABLET ORAL
Qty: 180 TAB | Refills: 3 | Status: SHIPPED | OUTPATIENT
Start: 2017-11-09

## 2017-11-09 ASSESSMENT — ENCOUNTER SYMPTOMS
EYE PAIN: 0
INSOMNIA: 1
BACK PAIN: 1
BRUISES/BLEEDS EASILY: 0
NERVOUS/ANXIOUS: 0
VOMITING: 0
HEMOPTYSIS: 0
DEPRESSION: 0
FEVER: 0
SPEECH CHANGE: 0
BLURRED VISION: 0
PALPITATIONS: 0
EYE DISCHARGE: 0
CHILLS: 0
ABDOMINAL PAIN: 0
MYALGIAS: 0
LOSS OF CONSCIOUSNESS: 0
NAUSEA: 0
COUGH: 0
WHEEZING: 0

## 2017-11-09 NOTE — PROGRESS NOTES
"Subjective:   Galileo Torrez is a 76 y.o. male who presents today for f/u CAD, HTN, dyslipidemia and PAD    The patient is doing well from cardiac standpoint.   Pretty much wheel chair bound.   He denies any chest pain, palpitation or heart failure type symptoms.  Denies any side effect from medications.    Past Medical History:   Diagnosis Date   • Abnormality of gait    • Alcoholic polyneuropathy (CMS-HCC)    • Aortic valve disorders    • Atherosclerosis of renal artery (CMS-HCC)    • Blood clotting disorder (CMS-HCC) 10/15/15      left leg   • Bowel habit changes     constipation   • Breath shortness     says from smoking  No O2   • CAD (coronary artery disease) May 2009    LAD diffuse, 80% distal, %, OM1,%, Grafts all occluded LVEF 20%.  No targets for revascularization.   • Carotid artery stenosis August 2014    Carotid ultrasound with LICA 50-70% stenosis, YOLANDA <50% stenosis.  Unchanged from 2013.   • Chronic systolic heart failure (CMS-HCC)    • COPD    • Cough    • Dental disorder     dentures   • Dizziness    • Fall     Multiple falls   • Heart burn    • Hiatus hernia syndrome    • Hyperlipidemia    • Hypertension    • Insomnia, unspecified    • Lumbago    • Myocardial infarct 1995x2    5 stents   • Other specified forms of chronic ischemic heart disease    • Peptic ulcer, unspecified site, unspecified as acute or chronic, without mention of hemorrhage, perforation, or obstruction    • Peripheral vascular disease, unspecified February 2012    PCI/stent (Omnilink 7 x 28mm) to the right external iliac.   • Pure hypercholesterolemia    • Snoring    • Stroke (CMS-HCC) 2011    \"I don't know when, somebody said I did. A little one\"   • Tobacco use disorder    • Transient alteration of awareness    • Umbilical hernia without mention of obstruction or gangrene    • Unspecified disorder of kidney and ureter    • Unspecified hemorrhagic conditions     blood thinners   • Unspecified hypertensive " heart disease without heart failure    • Upper GI bleed      Past Surgical History:   Procedure Laterality Date   • MASS EXCISION GENERAL Left 6/8/2017    Procedure: MASS EXCISION GENERAL - SQUAMOUS CELL - EAR ;  Surgeon: Fuad Moreno M.D.;  Location: SURGERY SAME DAY [facility];  Service:    • FLAP CLOSURE Left 6/8/2017    Procedure: FLAP CLOSURE;  Surgeon: Fuad Moreno M.D.;  Location: SURGERY SAME DAY [facility];  Service:    • SPLIT THICKNESS SKIN GRAFT Left 8/25/2016    Procedure: SPLIT THICKNESS SKIN GRAFT LEG;  Surgeon: Fuad Moreno M.D.;  Location: [facility];  Service:    • STUMP REVISION Left 5/11/2016    Procedure: STUMP REVISION BK DEBRIDEMENT;  Surgeon: Lance Austin M.D.;  Location: [facility];  Service:    • KNEE AMPUTATION BELOW Left 12/22/2015    Procedure: KNEE AMPUTATION BELOW , REMOVAL OF LEFT TIBIAL BYPASS GRAFT;  Surgeon: Lance Austin M.D.;  Location: [facility];  Service:    • ANGIOGRAM Left 12/21/2015    Procedure: ANGIOGRAM, LEG;  Surgeon: Lance Austin M.D.;  Location: [facility];  Service:    • FEMORAL TIBIAL BYPASS Left 12/21/2015    Procedure: FEMORAL TIBIAL BYPASS with Distaflo Graft;  Surgeon: Lance Austin M.D.;  Location: [facility];  Service:    • FEMORAL POPLITEAL BYPASS Left 11/13/2015    Procedure: FEMORAL POPLITEAL ANGIOPLASTY cryo preserve vein placement  ;  Surgeon: Lance Austin M.D.;  Location: [facility];  Service:    • TOE AMPUTATION Left 11/9/2015    Procedure: TOE AMPUTATION FIRST AND SECOND;  Surgeon: Lance Austin M.D.;  Location: [facility];  Service:    • INCISION AND DRAINAGE GENERAL Left 11/9/2015    Procedure: INCISION AND DRAINAGE GENERAL WASHOUT, & WOUND VAC PLACEMENT LEG  ;  Surgeon: Lance Austin M.D.;  Location: [facility];  Service:    • FEMORAL POPLITEAL BYPASS Left 10/2/2015    Procedure: FEMORAL POPLITEAL BYPASS;  Surgeon: Lance Austin  "M.D.;  Location: SURGERY [facility];  Service:    • ANGIOGRAM  10/2/2015    Procedure: ANGIOGRAM;  Surgeon: Lance Austin M.D.;  Location: SURGERY [facility];  Service:    • RECOVERY  9/4/2015    Procedure: CATH LAB-[facility] W/COROS AND STENT [id number], ANGINA PECTORIS 413.9;  Surgeon: Recoveryonly Surgery;  Location: SURGERY PRE-POST PROC UNIT [facility];  Service:    • THROMBECTOMY  3/24/2015    Performed by Lance Austin M.D. at SURGERY [facility]   • ANGIOGRAM  3/24/2015    Performed by Lance Austin M.D. at SURGERY [facility]   • GROIN EXPLORATION  3/11/2015    Performed by Lance Austin M.D. at [facility]   • FEMORAL POPLITEAL BYPASS  2/11/2015    Performed by Lance Austin M.D. at SURGERY [facility]   • RECOVERY  1/15/2015    Performed by Ir-Recovery Surgery at SURGERY [facility]   • RECOVERY  2/28/2012    Performed by SURGERY, IR-RECOVERY at SURGERY SAME DAY [facility]   • GASTROSCOPY-ENDO  3/3/2010    Performed by KALE JUAREZ at ENDOSCOPY [facility]   • ENDOSTAT  3/3/2010    Performed by KALE JUAREZ at ENDOSCOPY [facility]   • SCLEROTHERAPHY  3/3/2010    Performed by KALE JUAREZ at ENDOSCOPY [facility]   • MULTIPLE CORONARY ARTERY BYPASS  1994     CABG x 3, in [place]   • OTHER  1984    LEFT ORBIT FX-SURGERY PIN/PLATE   • CARDIAC CATH, RIGHT/LEFT HEART      STENT     Family History   Problem Relation Age of Onset   • Heart Disease Mother    • Other Mother      brain surg   • Cancer Brother      melanoma     History   Smoking Status   • Current Every Day Smoker   • Packs/day: 1.00   • Years: 55.00   • Types: Cigarettes   Smokeless Tobacco   • Never Used     Allergies   Allergen Reactions   • Neosporin Pain Relieving Unspecified     Pt states \"I get blisters\".     Outpatient Encounter Prescriptions as of 11/9/2017   Medication Sig Dispense Refill   • amitriptyline (ELAVIL) 150 MG Tab Take 2 Tabs by mouth every bedtime. " 180 Tab 3   • gabapentin (NEURONTIN) 300 MG Cap Take 2 Caps by mouth every bedtime. 180 Cap 0   • ranitidine (ZANTAC) 150 MG Tab Take 1 Tab by mouth 2 times a day. 180 Tab 3   • omeprazole (PRILOSEC) 20 MG delayed-release capsule Take 1 Cap by mouth every day. 90 Cap 3   • atorvastatin (LIPITOR) 80 MG tablet Take 1 Tab by mouth every evening. 90 Tab 3   • ezetimibe (ZETIA) 10 MG Tab Take 1 Tab by mouth every day. 90 Tab 3   • metoprolol (LOPRESSOR) 25 MG Tab Take 2 Tabs by mouth 2 times a day. 180 Tab 3   • clopidogrel (PLAVIX) 75 MG Tab TAKE 1 TABLET BY MOUTH EVERY DAY 90 Tab 3   • aspirin EC (ECOTRIN) 81 MG Tablet Delayed Response Take 81 mg by mouth every morning. Indications: Heart Attack     • [DISCONTINUED] gabapentin (NEURONTIN) 300 MG Cap Take 2 Caps by mouth every bedtime. 180 Cap 0   • [DISCONTINUED] amitriptyline (ELAVIL) 150 MG Tab Take 2 Tabs by mouth every bedtime. Indications: Trouble Sleeping 180 Tab 3     No facility-administered encounter medications on file as of 11/9/2017.      Review of Systems   Constitutional: Negative for chills and fever.   HENT: Negative for congestion.    Eyes: Negative for blurred vision, pain and discharge.   Respiratory: Negative for cough, hemoptysis and wheezing.    Cardiovascular: Negative for chest pain and palpitations.   Gastrointestinal: Negative for abdominal pain, nausea and vomiting.   Musculoskeletal: Positive for back pain. Negative for joint pain and myalgias.   Skin: Negative for itching and rash.   Neurological: Negative for speech change and loss of consciousness.   Endo/Heme/Allergies: Does not bruise/bleed easily.   Psychiatric/Behavioral: Negative for depression. The patient has insomnia. The patient is not nervous/anxious.    All other systems reviewed and are negative.       Objective:   /64   Pulse 98   Ht 1.829 m (6')   Wt 95.3 kg (210 lb)   SpO2 90%   BMI 28.48 kg/m²     Physical Exam   Constitutional: He is oriented to person, place,  and time. No distress.   In wheel chair, s/p Lt BKA with prosthesis   HENT:   Mouth/Throat: Mucous membranes are normal.   Eyes: Conjunctivae and EOM are normal.   Neck: No JVD present. No tracheal deviation present. No thyroid mass and no thyromegaly present.   Cardiovascular: Normal rate, regular rhythm and intact distal pulses.    No murmur heard.  Pulmonary/Chest: Effort normal and breath sounds normal. No respiratory distress. He exhibits no tenderness.   Abdominal: Soft. There is no tenderness.   Musculoskeletal: Normal range of motion. He exhibits no edema.   Neurological: He is alert and oriented to person, place, and time. He has normal strength. He displays no tremor.   Skin: Skin is warm and dry. He is not diaphoretic.   Psychiatric: He has a normal mood and affect. His behavior is normal.   Vitals reviewed.    Labs in July LDL slightly high    Assessment:     1. Coronary artery disease involving native coronary artery of native heart without angina pectoris  amitriptyline (ELAVIL) 150 MG Tab    LIPID PROFILE   2. Mixed hyperlipidemia  LIPID PROFILE    COMP METABOLIC PANEL   3. Essential hypertension  COMP METABOLIC PANEL   4. Alcoholic polyneuropathy (CMS-HCC)     5. Pain, phantom limb (CMS-HCC)     6. Neuropathy (CMS-HCC)  gabapentin (NEURONTIN) 300 MG Cap       Medical Decision Making:  Today's Assessment / Status / Plan:     The patient's above cardiovascular conditions are stable. Will continue current medications and have the patient return for a followup in 6 months with labs. Will be happy to see the patient sooner as needed. Thank you for allowing me to participate in the caring of this patient.

## 2017-11-24 ENCOUNTER — RESOLUTE PROFESSIONAL BILLING HOSPITAL PROF FEE (OUTPATIENT)
Dept: HOSPITALIST | Facility: MEDICAL CENTER | Age: 77
End: 2017-11-24
Payer: MEDICARE

## 2017-11-24 ENCOUNTER — HOSPITAL ENCOUNTER (INPATIENT)
Facility: MEDICAL CENTER | Age: 77
LOS: 2 days | DRG: 603 | End: 2017-11-26
Attending: EMERGENCY MEDICINE | Admitting: INTERNAL MEDICINE
Payer: MEDICARE

## 2017-11-24 DIAGNOSIS — L03.115 CELLULITIS OF RIGHT LOWER LEG: ICD-10-CM

## 2017-11-24 DIAGNOSIS — I73.9 PERIPHERAL VASCULAR DISEASE (HCC): ICD-10-CM

## 2017-11-24 PROBLEM — L03.90 CELLULITIS: Status: ACTIVE | Noted: 2017-11-24

## 2017-11-24 LAB
ALBUMIN SERPL BCP-MCNC: 3.8 G/DL (ref 3.2–4.9)
ALBUMIN/GLOB SERPL: 1.2 G/DL
ALP SERPL-CCNC: 162 U/L (ref 30–99)
ALT SERPL-CCNC: 24 U/L (ref 2–50)
ANION GAP SERPL CALC-SCNC: 6 MMOL/L (ref 0–11.9)
APTT PPP: 29.9 SEC (ref 24.7–36)
AST SERPL-CCNC: 23 U/L (ref 12–45)
BASOPHILS # BLD AUTO: 1.1 % (ref 0–1.8)
BASOPHILS # BLD: 0.1 K/UL (ref 0–0.12)
BILIRUB SERPL-MCNC: 0.5 MG/DL (ref 0.1–1.5)
BUN SERPL-MCNC: 14 MG/DL (ref 8–22)
CALCIUM SERPL-MCNC: 9.5 MG/DL (ref 8.5–10.5)
CHLORIDE SERPL-SCNC: 108 MMOL/L (ref 96–112)
CO2 SERPL-SCNC: 24 MMOL/L (ref 20–33)
CREAT SERPL-MCNC: 1.23 MG/DL (ref 0.5–1.4)
EOSINOPHIL # BLD AUTO: 0.23 K/UL (ref 0–0.51)
EOSINOPHIL NFR BLD: 2.6 % (ref 0–6.9)
ERYTHROCYTE [DISTWIDTH] IN BLOOD BY AUTOMATED COUNT: 45.8 FL (ref 35.9–50)
GFR SERPL CREATININE-BSD FRML MDRD: 57 ML/MIN/1.73 M 2
GLOBULIN SER CALC-MCNC: 3.1 G/DL (ref 1.9–3.5)
GLUCOSE SERPL-MCNC: 98 MG/DL (ref 65–99)
HCT VFR BLD AUTO: 42.9 % (ref 42–52)
HGB BLD-MCNC: 14.5 G/DL (ref 14–18)
IMM GRANULOCYTES # BLD AUTO: 0.02 K/UL (ref 0–0.11)
IMM GRANULOCYTES NFR BLD AUTO: 0.2 % (ref 0–0.9)
LIPASE SERPL-CCNC: 15 U/L (ref 11–82)
LYMPHOCYTES # BLD AUTO: 2.13 K/UL (ref 1–4.8)
LYMPHOCYTES NFR BLD: 24 % (ref 22–41)
MCH RBC QN AUTO: 29.1 PG (ref 27–33)
MCHC RBC AUTO-ENTMCNC: 33.8 G/DL (ref 33.7–35.3)
MCV RBC AUTO: 86.1 FL (ref 81.4–97.8)
MONOCYTES # BLD AUTO: 0.67 K/UL (ref 0–0.85)
MONOCYTES NFR BLD AUTO: 7.6 % (ref 0–13.4)
NEUTROPHILS # BLD AUTO: 5.71 K/UL (ref 1.82–7.42)
NEUTROPHILS NFR BLD: 64.5 % (ref 44–72)
NRBC # BLD AUTO: 0 K/UL
NRBC BLD AUTO-RTO: 0 /100 WBC
PLATELET # BLD AUTO: 273 K/UL (ref 164–446)
PMV BLD AUTO: 9.1 FL (ref 9–12.9)
POTASSIUM SERPL-SCNC: 4.5 MMOL/L (ref 3.6–5.5)
PROT SERPL-MCNC: 6.9 G/DL (ref 6–8.2)
RBC # BLD AUTO: 4.98 M/UL (ref 4.7–6.1)
SODIUM SERPL-SCNC: 138 MMOL/L (ref 135–145)
WBC # BLD AUTO: 8.9 K/UL (ref 4.8–10.8)

## 2017-11-24 PROCEDURE — 700105 HCHG RX REV CODE 258

## 2017-11-24 PROCEDURE — A9270 NON-COVERED ITEM OR SERVICE: HCPCS | Performed by: INTERNAL MEDICINE

## 2017-11-24 PROCEDURE — 700102 HCHG RX REV CODE 250 W/ 637 OVERRIDE(OP): Performed by: INTERNAL MEDICINE

## 2017-11-24 PROCEDURE — 700105 HCHG RX REV CODE 258: Performed by: INTERNAL MEDICINE

## 2017-11-24 PROCEDURE — 80053 COMPREHEN METABOLIC PANEL: CPT

## 2017-11-24 PROCEDURE — 85025 COMPLETE CBC W/AUTO DIFF WBC: CPT

## 2017-11-24 PROCEDURE — 93971 EXTREMITY STUDY: CPT

## 2017-11-24 PROCEDURE — 96374 THER/PROPH/DIAG INJ IV PUSH: CPT

## 2017-11-24 PROCEDURE — 85730 THROMBOPLASTIN TIME PARTIAL: CPT

## 2017-11-24 PROCEDURE — 36415 COLL VENOUS BLD VENIPUNCTURE: CPT

## 2017-11-24 PROCEDURE — 700111 HCHG RX REV CODE 636 W/ 250 OVERRIDE (IP): Performed by: EMERGENCY MEDICINE

## 2017-11-24 PROCEDURE — 770006 HCHG ROOM/CARE - MED/SURG/GYN SEMI*

## 2017-11-24 PROCEDURE — 93922 UPR/L XTREMITY ART 2 LEVELS: CPT

## 2017-11-24 PROCEDURE — 93926 LOWER EXTREMITY STUDY: CPT

## 2017-11-24 PROCEDURE — 99223 1ST HOSP IP/OBS HIGH 75: CPT | Mod: AI | Performed by: INTERNAL MEDICINE

## 2017-11-24 PROCEDURE — 83690 ASSAY OF LIPASE: CPT

## 2017-11-24 PROCEDURE — 96375 TX/PRO/DX INJ NEW DRUG ADDON: CPT

## 2017-11-24 PROCEDURE — 700111 HCHG RX REV CODE 636 W/ 250 OVERRIDE (IP)

## 2017-11-24 PROCEDURE — 99285 EMERGENCY DEPT VISIT HI MDM: CPT

## 2017-11-24 PROCEDURE — 96366 THER/PROPH/DIAG IV INF ADDON: CPT

## 2017-11-24 PROCEDURE — 96365 THER/PROPH/DIAG IV INF INIT: CPT

## 2017-11-24 RX ORDER — FAMOTIDINE 20 MG/1
20 TABLET, FILM COATED ORAL 2 TIMES DAILY
Status: DISCONTINUED | OUTPATIENT
Start: 2017-11-24 | End: 2017-11-26 | Stop reason: HOSPADM

## 2017-11-24 RX ORDER — POLYETHYLENE GLYCOL 3350 17 G/17G
1 POWDER, FOR SOLUTION ORAL
Status: DISCONTINUED | OUTPATIENT
Start: 2017-11-24 | End: 2017-11-26 | Stop reason: HOSPADM

## 2017-11-24 RX ORDER — GABAPENTIN 300 MG/1
600 CAPSULE ORAL
Status: DISCONTINUED | OUTPATIENT
Start: 2017-11-24 | End: 2017-11-26 | Stop reason: HOSPADM

## 2017-11-24 RX ORDER — OMEPRAZOLE 20 MG/1
20 CAPSULE, DELAYED RELEASE ORAL
Status: DISCONTINUED | OUTPATIENT
Start: 2017-11-24 | End: 2017-11-25

## 2017-11-24 RX ORDER — ATORVASTATIN CALCIUM 40 MG/1
80 TABLET, FILM COATED ORAL EVERY EVENING
Status: DISCONTINUED | OUTPATIENT
Start: 2017-11-24 | End: 2017-11-26 | Stop reason: HOSPADM

## 2017-11-24 RX ORDER — NICOTINE 21 MG/24HR
21 PATCH, TRANSDERMAL 24 HOURS TRANSDERMAL
Status: DISCONTINUED | OUTPATIENT
Start: 2017-11-24 | End: 2017-11-26 | Stop reason: HOSPADM

## 2017-11-24 RX ORDER — CEFTRIAXONE 1 G/1
1 INJECTION, POWDER, FOR SOLUTION INTRAMUSCULAR; INTRAVENOUS ONCE
Status: COMPLETED | OUTPATIENT
Start: 2017-11-24 | End: 2017-11-24

## 2017-11-24 RX ORDER — HEPARIN SODIUM 5000 [USP'U]/ML
5000 INJECTION, SOLUTION INTRAVENOUS; SUBCUTANEOUS EVERY 8 HOURS
Status: DISCONTINUED | OUTPATIENT
Start: 2017-11-24 | End: 2017-11-26 | Stop reason: HOSPADM

## 2017-11-24 RX ORDER — ACETAMINOPHEN 500 MG
500 TABLET ORAL EVERY 6 HOURS
Status: DISCONTINUED | OUTPATIENT
Start: 2017-11-24 | End: 2017-11-26 | Stop reason: HOSPADM

## 2017-11-24 RX ORDER — EZETIMIBE 10 MG/1
10 TABLET ORAL EVERY EVENING
Status: DISCONTINUED | OUTPATIENT
Start: 2017-11-24 | End: 2017-11-26 | Stop reason: HOSPADM

## 2017-11-24 RX ORDER — EZETIMIBE 10 MG/1
10 TABLET ORAL EVERY EVENING
COMMUNITY
End: 2018-07-16 | Stop reason: ALTCHOICE

## 2017-11-24 RX ORDER — CLOPIDOGREL BISULFATE 75 MG/1
75 TABLET ORAL EVERY EVENING
COMMUNITY

## 2017-11-24 RX ORDER — ONDANSETRON 4 MG/1
4 TABLET, ORALLY DISINTEGRATING ORAL EVERY 4 HOURS PRN
Status: DISCONTINUED | OUTPATIENT
Start: 2017-11-24 | End: 2017-11-26 | Stop reason: HOSPADM

## 2017-11-24 RX ORDER — ACETAMINOPHEN 325 MG/1
650 TABLET ORAL EVERY 6 HOURS PRN
Status: DISCONTINUED | OUTPATIENT
Start: 2017-11-24 | End: 2017-11-26 | Stop reason: HOSPADM

## 2017-11-24 RX ORDER — SODIUM CHLORIDE 9 MG/ML
INJECTION, SOLUTION INTRAVENOUS CONTINUOUS
Status: DISCONTINUED | OUTPATIENT
Start: 2017-11-24 | End: 2017-11-25

## 2017-11-24 RX ORDER — METOPROLOL TARTRATE 50 MG/1
50 TABLET, FILM COATED ORAL 2 TIMES DAILY
Status: DISCONTINUED | OUTPATIENT
Start: 2017-11-24 | End: 2017-11-26 | Stop reason: HOSPADM

## 2017-11-24 RX ORDER — CLOPIDOGREL BISULFATE 75 MG/1
75 TABLET ORAL EVERY EVENING
Status: DISCONTINUED | OUTPATIENT
Start: 2017-11-24 | End: 2017-11-26 | Stop reason: HOSPADM

## 2017-11-24 RX ORDER — AMOXICILLIN 250 MG
2 CAPSULE ORAL 2 TIMES DAILY
Status: DISCONTINUED | OUTPATIENT
Start: 2017-11-24 | End: 2017-11-26 | Stop reason: HOSPADM

## 2017-11-24 RX ORDER — BISACODYL 10 MG
10 SUPPOSITORY, RECTAL RECTAL
Status: DISCONTINUED | OUTPATIENT
Start: 2017-11-24 | End: 2017-11-26 | Stop reason: HOSPADM

## 2017-11-24 RX ORDER — ONDANSETRON 2 MG/ML
4 INJECTION INTRAMUSCULAR; INTRAVENOUS EVERY 4 HOURS PRN
Status: DISCONTINUED | OUTPATIENT
Start: 2017-11-24 | End: 2017-11-26 | Stop reason: HOSPADM

## 2017-11-24 RX ORDER — AMITRIPTYLINE HYDROCHLORIDE 100 MG/1
300 TABLET ORAL
Status: DISCONTINUED | OUTPATIENT
Start: 2017-11-24 | End: 2017-11-26 | Stop reason: HOSPADM

## 2017-11-24 RX ADMIN — CEFTRIAXONE SODIUM 1 G: 1 INJECTION, POWDER, FOR SOLUTION INTRAMUSCULAR; INTRAVENOUS at 13:06

## 2017-11-24 RX ADMIN — ASPIRIN 81 MG: 81 TABLET, COATED ORAL at 16:21

## 2017-11-24 RX ADMIN — ATORVASTATIN CALCIUM 80 MG: 40 TABLET, FILM COATED ORAL at 19:53

## 2017-11-24 RX ADMIN — GABAPENTIN 600 MG: 300 CAPSULE ORAL at 19:55

## 2017-11-24 RX ADMIN — OMEPRAZOLE 20 MG: 20 CAPSULE, DELAYED RELEASE ORAL at 16:20

## 2017-11-24 RX ADMIN — EZETIMIBE 10 MG: 10 TABLET ORAL at 19:54

## 2017-11-24 RX ADMIN — METOPROLOL TARTRATE 50 MG: 50 TABLET, FILM COATED ORAL at 19:57

## 2017-11-24 RX ADMIN — FAMOTIDINE 20 MG: 20 TABLET, FILM COATED ORAL at 19:54

## 2017-11-24 RX ADMIN — AMITRIPTYLINE HYDROCHLORIDE 300 MG: 100 TABLET, FILM COATED ORAL at 19:53

## 2017-11-24 RX ADMIN — CLOPIDOGREL 75 MG: 75 TABLET, FILM COATED ORAL at 19:54

## 2017-11-24 RX ADMIN — VANCOMYCIN HYDROCHLORIDE 2400 MG: 100 INJECTION, POWDER, LYOPHILIZED, FOR SOLUTION INTRAVENOUS at 13:40

## 2017-11-24 RX ADMIN — SODIUM CHLORIDE: 9 INJECTION, SOLUTION INTRAVENOUS at 17:10

## 2017-11-24 RX ADMIN — ACETAMINOPHEN 500 MG: 500 TABLET ORAL at 17:11

## 2017-11-24 RX ADMIN — METOPROLOL TARTRATE 50 MG: 50 TABLET, FILM COATED ORAL at 16:20

## 2017-11-24 ASSESSMENT — ENCOUNTER SYMPTOMS
COUGH: 0
CHILLS: 0
SHORTNESS OF BREATH: 0
FEVER: 0
MYALGIAS: 0
LOSS OF CONSCIOUSNESS: 0
VOMITING: 0
DEPRESSION: 0
NAUSEA: 0
DIZZINESS: 0
NECK PAIN: 0
BLURRED VISION: 0
ABDOMINAL PAIN: 0
FOCAL WEAKNESS: 0

## 2017-11-24 ASSESSMENT — PATIENT HEALTH QUESTIONNAIRE - PHQ9
1. LITTLE INTEREST OR PLEASURE IN DOING THINGS: NOT AT ALL
SUM OF ALL RESPONSES TO PHQ QUESTIONS 1-9: 0
2. FEELING DOWN, DEPRESSED, IRRITABLE, OR HOPELESS: NOT AT ALL
SUM OF ALL RESPONSES TO PHQ9 QUESTIONS 1 AND 2: 0

## 2017-11-24 ASSESSMENT — COPD QUESTIONNAIRES
DO YOU EVER COUGH UP ANY MUCUS OR PHLEGM?: NO/ONLY WITH OCCASIONAL COLDS OR INFECTIONS
DURING THE PAST 4 WEEKS HOW MUCH DID YOU FEEL SHORT OF BREATH: MOST  OR ALL OF THE TIME
HAVE YOU SMOKED AT LEAST 100 CIGARETTES IN YOUR ENTIRE LIFE: YES
COPD SCREENING SCORE: 7

## 2017-11-24 ASSESSMENT — LIFESTYLE VARIABLES
DO YOU DRINK ALCOHOL: NO
EVER_SMOKED: YES
ALCOHOL_USE: NO

## 2017-11-24 ASSESSMENT — PAIN SCALES - GENERAL
PAINLEVEL_OUTOF10: 2
PAINLEVEL_OUTOF10: 0

## 2017-11-24 NOTE — ED NOTES
The Medication Reconciliation process has been completed by interviewing the patient    Allergies have been reviewed  Antibiotic use in 30 days - none    [facility]

## 2017-11-24 NOTE — ED PROVIDER NOTES
ED Provider Note    CHIEF COMPLAINT  Chief Complaint   Patient presents with   • Leg Swelling     x 2 weeks with redness/pain to right lower leg       HPI  Galileo Torrez is a 77 y.o. male who presentsWith increasing redness to the right lower leg.  Redness has progressively worsened and is spreading up his right leg.  He states he has pain in his right foot.  Patient has a pending appointment with Dr. Sales from vascular surgery on December 12.  Secondary to worsening swelling and redness, he presents for evaluation.  No numbness to the foot.  He has had a left-sided below-knee amputation from previous peripheral vascular disease.  Patient denies fever or chills.  No chest pain or difficulty breathing.  Patient denies trauma    REVIEW OF SYSTEMS    Constitutional: No fever  Respiratory: No shortness of breath  Cardiac: Peripheral vascular disease.  No chest pain or syncope  Gastrointestinal: No abdominal pain  Musculoskeletal: Right foot pain  Neurologic: Denies new numbness  Skin: Swelling and redness right leg       All other systems are negative.       PAST MEDICAL HISTORY  Past Medical History:   Diagnosis Date   • Abnormality of gait    • Alcoholic polyneuropathy (CMS-HCC)    • Aortic valve disorders    • Atherosclerosis of renal artery (CMS-HCC)    • Blood clotting disorder (CMS-[facility]) 10/15/15      left leg   • Bowel habit changes     constipation   • Breath shortness     says from smoking  No O2   • CAD (coronary artery disease) May 2009    LAD diffuse, 80% distal, %, OM1,%, Grafts all occluded LVEF 20%.  No targets for revascularization.   • Carotid artery stenosis August 2014    Carotid ultrasound with LICA 50-70% stenosis, YOLANDA <50% stenosis.  Unchanged from 2013.   • Chronic systolic heart failure (CMS-HCC)    • COPD    • Cough    • Dental disorder     dentures   • Dizziness    • Fall     Multiple falls   • Heart burn    • Hiatus hernia syndrome    • Hyperlipidemia    •  "Hypertension    • Insomnia, unspecified    • Lumbago    • Myocardial infarct 1995x2    5 stents   • Other specified forms of chronic ischemic heart disease    • Peptic ulcer, unspecified site, unspecified as acute or chronic, without mention of hemorrhage, perforation, or obstruction    • Peripheral vascular disease, unspecified February 2012    PCI/stent (Omnilink 7 x 28mm) to the right external iliac.   • Pure hypercholesterolemia    • Snoring    • Stroke (CMS-[facility]) 2011    \"I don't know when, somebody said I did. A little one\"   • Tobacco use disorder    • Transient alteration of awareness    • Umbilical hernia without mention of obstruction or gangrene    • Unspecified disorder of kidney and ureter    • Unspecified hemorrhagic conditions     blood thinners   • Unspecified hypertensive heart disease without heart failure    • Upper GI bleed        FAMILY HISTORY  Family History   Problem Relation Age of Onset   • Heart Disease Mother    • Other Mother      brain surg   • Cancer Brother      melanoma       SOCIAL HISTORY  Social History     Social History   • Marital status:      Spouse name: N/A   • Number of children: N/A   • Years of education: N/A     Social History Main Topics   • Smoking status: Current Every Day Smoker     Packs/day: 1.00     Years: 55.00     Types: Cigarettes   • Smokeless tobacco: Never Used   • Alcohol use No   • Drug use: No      Comment: denies   • Sexual activity: Not Currently     Other Topics Concern   • Not on file     Social History Narrative   • No narrative on file       SURGICAL HISTORY  Past Surgical History:   Procedure Laterality Date   • MASS EXCISION GENERAL Left 6/8/2017    Procedure: MASS EXCISION GENERAL - SQUAMOUS CELL - EAR ;  Surgeon: Fuad Moreno M.D.;  Location: SURGERY SAME DAY [facility];  Service:    • FLAP CLOSURE Left 6/8/2017    Procedure: FLAP CLOSURE;  Surgeon: Fuad Moreno M.D.;  Location: SURGERY SAME DAY [facility];  Service:    • SPLIT " THICKNESS SKIN GRAFT Left 8/25/2016    Procedure: SPLIT THICKNESS SKIN GRAFT LEG;  Surgeon: Fuad Moreno M.D.;  Location: [facility];  Service:    • STUMP REVISION Left 5/11/2016    Procedure: STUMP REVISION BK DEBRIDEMENT;  Surgeon: Lance Austin M.D.;  Location: [facility];  Service:    • KNEE AMPUTATION BELOW Left 12/22/2015    Procedure: KNEE AMPUTATION BELOW , REMOVAL OF LEFT TIBIAL BYPASS GRAFT;  Surgeon: Lance Austin M.D.;  Location: [facility];  Service:    • ANGIOGRAM Left 12/21/2015    Procedure: ANGIOGRAM, LEG;  Surgeon: Lance Austin M.D.;  Location: [facility];  Service:    • FEMORAL TIBIAL BYPASS Left 12/21/2015    Procedure: FEMORAL TIBIAL BYPASS with Distaflo Graft;  Surgeon: Lance Austin M.D.;  Location: [facility];  Service:    • FEMORAL POPLITEAL BYPASS Left 11/13/2015    Procedure: FEMORAL POPLITEAL ANGIOPLASTY cryo preserve vein placement  ;  Surgeon: Lance Austin M.D.;  Location: [facility];  Service:    • TOE AMPUTATION Left 11/9/2015    Procedure: TOE AMPUTATION FIRST AND SECOND;  Surgeon: Lance Austin M.D.;  Location: [facility];  Service:    • INCISION AND DRAINAGE GENERAL Left 11/9/2015    Procedure: INCISION AND DRAINAGE GENERAL WASHOUT, & WOUND VAC PLACEMENT LEG  ;  Surgeon: Lance Austin M.D.;  Location: [facility];  Service:    • FEMORAL POPLITEAL BYPASS Left 10/2/2015    Procedure: FEMORAL POPLITEAL BYPASS;  Surgeon: Lance Austin M.D.;  Location: [facility];  Service:    • ANGIOGRAM  10/2/2015    Procedure: ANGIOGRAM;  Surgeon: Lance Austin M.D.;  Location: [facility];  Service:    • RECOVERY  9/4/2015    Procedure: CATH LAB-[facility] W/COROS AND STENT [phone] [id number], ANGINA PECTORIS 413.9;  Surgeon: [facility];  Location: SURGERY PRE-POST PROC UNIT [facility];  Service:    • THROMBECTOMY  3/24/2015    Performed by Lance Austin  "M.D. at SURGERY [facility]   • ANGIOGRAM  3/24/2015    Performed by Lance Austin M.D. at SURGERY [facility]   • GROIN EXPLORATION  3/11/2015    Performed by Lance Austin M.D. at SURGERY [facility]   • FEMORAL POPLITEAL BYPASS  2/11/2015    Performed by Lance Austin M.D. at SURGERY [facility]   • RECOVERY  1/15/2015    Performed by [facility] Surgery at SURGERY [facility]   • RECOVERY  2/28/2012    Performed by SURGERY, IR-RECOVERY at SURGERY SAME DAY [facility]   • GASTROSCOPY-ENDO  3/3/2010    Performed by KALE JUAREZ at ENDOSCOPY [facility]   • ENDOSTAT  3/3/2010    Performed by KALE JUAREZ at ENDOSCOPY [facility]   • SCLEROTHERAPHY  3/3/2010    Performed by KALE JUAREZ at ENDOSCOPY [facility]   • MULTIPLE CORONARY ARTERY BYPASS  1994     CABG x 3, in [place]   • OTHER  1984    LEFT ORBIT FX-SURGERY PIN/PLATE   • CARDIAC CATH, RIGHT/LEFT HEART      STENT       CURRENT MEDICATIONS  Home Medications    **Home medications have not yet been reviewed for this encounter**         ALLERGIES  Allergies   Allergen Reactions   • Neosporin Pain Relieving Unspecified     Pt states \"I get blisters\".       PHYSICAL EXAM  VITAL SIGNS: /74   Pulse 79   Temp 36.7 °C (98 °F) (Temporal)   Resp 16   Ht 1.829 m (6')   Wt 95.3 kg (210 lb 1.6 oz)   SpO2 94%   BMI 28.49 kg/m²   Constitutional:  Non-toxic appearance.   HENT: No facial swelling  Eyes: Anicteric, no conjunctivitis.     Cardiovascular: Normal heart rate, Normal rhythm.  Pulses not palpable in the right foot  Pulmonary:  No wheezing, No rales.   Gastrointestinal: Soft, No tenderness, No masses  Skin: Diminished capillary refill right foot.  The skin is Warm,Single point of oozing serous fluid right mid shin.  Erythematous change to the toes, foot, with lesser severity of red change to the mid calf.   Neurologic: Speech is clear, follows commands, facial expression is symmetrical.Slightly " diminished sensation distal right foot.  Sensation to the mid foot and ankle and calf present.  Strength of the right foot intact  Psychiatric: AffectFlat,  Mood normal.  Cooperative  Musculoskeletal:Left BKA.  Right lower extremity is slightly swollen with tenderness to the calf musculature and right foot.  No crepitance or deformity    EKG/Labs  Results for orders placed or performed during the hospital encounter of 11/24/17   COMP METABOLIC PANEL   Result Value Ref Range    Sodium 138 135 - 145 mmol/L    Potassium 4.5 3.6 - 5.5 mmol/L    Chloride 108 96 - 112 mmol/L    Co2 24 20 - 33 mmol/L    Anion Gap 6.0 0.0 - 11.9    Glucose 98 65 - 99 mg/dL    Bun 14 8 - 22 mg/dL    Creatinine 1.23 0.50 - 1.40 mg/dL    Calcium 9.5 8.5 - 10.5 mg/dL    AST(SGOT) 23 12 - 45 U/L    ALT(SGPT) 24 2 - 50 U/L    Alkaline Phosphatase 162 (H) 30 - 99 U/L    Total Bilirubin 0.5 0.1 - 1.5 mg/dL    Albumin 3.8 3.2 - 4.9 g/dL    Total Protein 6.9 6.0 - 8.2 g/dL    Globulin 3.1 1.9 - 3.5 g/dL    A-G Ratio 1.2 g/dL   LIPASE   Result Value Ref Range    Lipase 15 11 - 82 U/L   ESTIMATED GFR   Result Value Ref Range    GFR If African American >60 >60 mL/min/1.73 m 2    GFR If Non  57 (A) >60 mL/min/1.73 m 2   CBC WITH DIFFERENTIAL   Result Value Ref Range    WBC 8.9 4.8 - 10.8 K/uL    RBC 4.98 4.70 - 6.10 M/uL    Hemoglobin 14.5 14.0 - 18.0 g/dL    Hematocrit 42.9 42.0 - 52.0 %    MCV 86.1 81.4 - 97.8 fL    MCH 29.1 27.0 - 33.0 pg    MCHC 33.8 33.7 - 35.3 g/dL    RDW 45.8 35.9 - 50.0 fL    Platelet Count 273 164 - 446 K/uL    MPV 9.1 9.0 - 12.9 fL    Neutrophils-Polys 64.50 44.00 - 72.00 %    Lymphocytes 24.00 22.00 - 41.00 %    Monocytes 7.60 0.00 - 13.40 %    Eosinophils 2.60 0.00 - 6.90 %    Basophils 1.10 0.00 - 1.80 %    Immature Granulocytes 0.20 0.00 - 0.90 %    Nucleated RBC 0.00 /100 WBC    Neutrophils (Absolute) 5.71 1.82 - 7.42 K/uL    Lymphs (Absolute) 2.13 1.00 - 4.80 K/uL    Monos (Absolute) 0.67 0.00 - 0.85 K/uL     Eos (Absolute) 0.23 0.00 - 0.51 K/uL    Baso (Absolute) 0.10 0.00 - 0.12 K/uL    Immature Granulocytes (abs) 0.02 0.00 - 0.11 K/uL    NRBC (Absolute) 0.00 K/uL   APTT   Result Value Ref Range    APTT 29.9 24.7 - 36.0 sec         RADIOLOGY/PROCEDURES  LE ART/KALPANA         LE ART DUPLX/IMAG (Specify in Comments Left, Right Or Bilateral)         LE VENOUS DUPLEX (Specify in Comments Left, Right Or Bilateral)         LE VENOUS DUPLEX (Specify in Comments Left, Right Or Bilateral)    (Results Pending)   LE ART DUPLX/IMAG    (Results Pending)         COURSE & MEDICAL DECISION MAKING  Pertinent Labs & Imaging studies reviewed. (See chart for details)  Patient with abnormal ABIs, this is preliminary results with severe vascular disease noted.  Patient has monophasic blood flow down to his foot so there is no overt ischemia.  There is also noted to be collateral flow suggesting a chronic nature to this poor blood flow.  The patient now has developed erythema and swelling suggestive of cellulitis in the setting of poor circulation.  Patient will be started on IV antibiotics and admitted for ongoing workup and stabilization.  Ultrasound was negative for DVT, this was obtained as the patient complained of swelling and calf tenderness.    FINAL IMPRESSION     1. Cellulitis of right lower leg    2. Peripheral vascular disease (CMS-[facility])                    Electronically signed by: Galileo Villela, 11/24/2017 1:20 PM

## 2017-11-25 LAB
ANION GAP SERPL CALC-SCNC: 8 MMOL/L (ref 0–11.9)
BASOPHILS # BLD AUTO: 1.2 % (ref 0–1.8)
BASOPHILS # BLD: 0.11 K/UL (ref 0–0.12)
BUN SERPL-MCNC: 14 MG/DL (ref 8–22)
CALCIUM SERPL-MCNC: 8.5 MG/DL (ref 8.5–10.5)
CHLORIDE SERPL-SCNC: 108 MMOL/L (ref 96–112)
CO2 SERPL-SCNC: 22 MMOL/L (ref 20–33)
CREAT SERPL-MCNC: 1.11 MG/DL (ref 0.5–1.4)
EOSINOPHIL # BLD AUTO: 0.35 K/UL (ref 0–0.51)
EOSINOPHIL NFR BLD: 3.7 % (ref 0–6.9)
ERYTHROCYTE [DISTWIDTH] IN BLOOD BY AUTOMATED COUNT: 44.6 FL (ref 35.9–50)
GFR SERPL CREATININE-BSD FRML MDRD: >60 ML/MIN/1.73 M 2
GLUCOSE SERPL-MCNC: 95 MG/DL (ref 65–99)
HCT VFR BLD AUTO: 41.2 % (ref 42–52)
HGB BLD-MCNC: 14 G/DL (ref 14–18)
IMM GRANULOCYTES # BLD AUTO: 0.02 K/UL (ref 0–0.11)
IMM GRANULOCYTES NFR BLD AUTO: 0.2 % (ref 0–0.9)
LYMPHOCYTES # BLD AUTO: 2.35 K/UL (ref 1–4.8)
LYMPHOCYTES NFR BLD: 24.7 % (ref 22–41)
MCH RBC QN AUTO: 29.2 PG (ref 27–33)
MCHC RBC AUTO-ENTMCNC: 34 G/DL (ref 33.7–35.3)
MCV RBC AUTO: 85.8 FL (ref 81.4–97.8)
MONOCYTES # BLD AUTO: 0.72 K/UL (ref 0–0.85)
MONOCYTES NFR BLD AUTO: 7.6 % (ref 0–13.4)
NEUTROPHILS # BLD AUTO: 5.97 K/UL (ref 1.82–7.42)
NEUTROPHILS NFR BLD: 62.6 % (ref 44–72)
NRBC # BLD AUTO: 0 K/UL
NRBC BLD AUTO-RTO: 0 /100 WBC
PLATELET # BLD AUTO: 284 K/UL (ref 164–446)
PMV BLD AUTO: 9.2 FL (ref 9–12.9)
POTASSIUM SERPL-SCNC: 4.4 MMOL/L (ref 3.6–5.5)
RBC # BLD AUTO: 4.8 M/UL (ref 4.7–6.1)
SODIUM SERPL-SCNC: 138 MMOL/L (ref 135–145)
WBC # BLD AUTO: 9.5 K/UL (ref 4.8–10.8)

## 2017-11-25 PROCEDURE — 700105 HCHG RX REV CODE 258

## 2017-11-25 PROCEDURE — A9270 NON-COVERED ITEM OR SERVICE: HCPCS | Performed by: INTERNAL MEDICINE

## 2017-11-25 PROCEDURE — 36415 COLL VENOUS BLD VENIPUNCTURE: CPT

## 2017-11-25 PROCEDURE — 700111 HCHG RX REV CODE 636 W/ 250 OVERRIDE (IP)

## 2017-11-25 PROCEDURE — 700102 HCHG RX REV CODE 250 W/ 637 OVERRIDE(OP): Performed by: INTERNAL MEDICINE

## 2017-11-25 PROCEDURE — 700111 HCHG RX REV CODE 636 W/ 250 OVERRIDE (IP): Performed by: INTERNAL MEDICINE

## 2017-11-25 PROCEDURE — 94760 N-INVAS EAR/PLS OXIMETRY 1: CPT

## 2017-11-25 PROCEDURE — 700105 HCHG RX REV CODE 258: Performed by: INTERNAL MEDICINE

## 2017-11-25 PROCEDURE — 80048 BASIC METABOLIC PNL TOTAL CA: CPT

## 2017-11-25 PROCEDURE — 85025 COMPLETE CBC W/AUTO DIFF WBC: CPT

## 2017-11-25 PROCEDURE — 99233 SBSQ HOSP IP/OBS HIGH 50: CPT | Performed by: INTERNAL MEDICINE

## 2017-11-25 PROCEDURE — 99407 BEHAV CHNG SMOKING > 10 MIN: CPT

## 2017-11-25 PROCEDURE — 770006 HCHG ROOM/CARE - MED/SURG/GYN SEMI*

## 2017-11-25 RX ADMIN — FAMOTIDINE 20 MG: 20 TABLET, FILM COATED ORAL at 20:50

## 2017-11-25 RX ADMIN — ATORVASTATIN CALCIUM 80 MG: 40 TABLET, FILM COATED ORAL at 20:50

## 2017-11-25 RX ADMIN — ASPIRIN 81 MG: 81 TABLET, COATED ORAL at 09:44

## 2017-11-25 RX ADMIN — METOPROLOL TARTRATE 50 MG: 50 TABLET, FILM COATED ORAL at 09:44

## 2017-11-25 RX ADMIN — METOPROLOL TARTRATE 50 MG: 50 TABLET, FILM COATED ORAL at 20:51

## 2017-11-25 RX ADMIN — CLOPIDOGREL 75 MG: 75 TABLET, FILM COATED ORAL at 20:50

## 2017-11-25 RX ADMIN — AMITRIPTYLINE HYDROCHLORIDE 300 MG: 100 TABLET, FILM COATED ORAL at 20:49

## 2017-11-25 RX ADMIN — VANCOMYCIN HYDROCHLORIDE 1300 MG: 100 INJECTION, POWDER, LYOPHILIZED, FOR SOLUTION INTRAVENOUS at 12:39

## 2017-11-25 RX ADMIN — SODIUM CHLORIDE: 9 INJECTION, SOLUTION INTRAVENOUS at 06:07

## 2017-11-25 RX ADMIN — EZETIMIBE 10 MG: 10 TABLET ORAL at 20:50

## 2017-11-25 RX ADMIN — GABAPENTIN 600 MG: 300 CAPSULE ORAL at 20:50

## 2017-11-25 RX ADMIN — FAMOTIDINE 20 MG: 20 TABLET, FILM COATED ORAL at 09:44

## 2017-11-25 RX ADMIN — CEFTRIAXONE 2 G: 2 INJECTION, POWDER, FOR SOLUTION INTRAMUSCULAR; INTRAVENOUS at 09:44

## 2017-11-25 ASSESSMENT — ENCOUNTER SYMPTOMS
DIAPHORESIS: 0
WHEEZING: 0
STRIDOR: 0
COUGH: 0
PALPITATIONS: 0
FEVER: 0
SHORTNESS OF BREATH: 0
NAUSEA: 0
CHILLS: 0
VOMITING: 0
CLAUDICATION: 1
MYALGIAS: 0
CONSTIPATION: 0

## 2017-11-25 NOTE — PROGRESS NOTES
Pharmacy Kinetics 77 y.o. male on vancomycin day # 2   2017    Currently on Vancomycin 1300 mg iv q24hr (1200)    Indication for Treatment: SSTI     Pertinent history per medical record: Admitted on 2017. Presents with increasing redness to the right lower leg.  Redness has progressively worsened and is spreading up his right leg.  He states he has pain in his right foot.  Patient has a pending appointment with Dr. Sales from vascular surgery on .  He has had a left-sided below-knee amputation from previous peripheral vascular disease. Patient denies trauma.     Other antibiotics: Ceftriaxone      Allergies: Neosporin pain relieving      List concerns for renal function: age, obesity, BKA    Pertinent cultures to date:   None @ this time    Recent Labs      17   1300  17   0134   WBC  8.9  9.5   NEUTSPOLYS  64.50  62.60     Recent Labs      17   1052  17   0134   BUN  14  14   CREATININE  1.23  1.11   ALBUMIN  3.8   --      No results for input(s): VANCOTROUGH, VANCOPEAK, VANCORANDOM in the last 72 hours.  Intake/Output Summary (Last 24 hours) at 17 0857  Last data filed at 17 0800   Gross per 24 hour   Intake             1680 ml   Output             1125 ml   Net              555 ml      Blood pressure 146/67, pulse 76, temperature 36.8 °C (98.2 °F), resp. rate 18, height 1.829 m (6'), weight 95.3 kg (210 lb 1.6 oz), SpO2 94 %. Temp (24hrs), Av.6 °C (97.8 °F), Min:36.3 °C (97.3 °F), Max:36.8 °C (98.2 °F)      A/P   1. Vancomycin dose change: no change   2. Next vancomycin level: 17@1130  3. Goal trough: 12-16 mcg/mL   4. Comments: No leukocytosis , afebrile.No cultures at this time. Vancomycin initiated per protocol, level ordered prior to 3rd dose total dose.     Edy Aldana PharmD [facility]

## 2017-11-25 NOTE — PROGRESS NOTES
Skin check with Anastasia CATALAN. Bilateral upper extremity bruising and thin, fragile skin. RLE redness and hot to touch. Sacrum blanchable. Right heel calloused.

## 2017-11-25 NOTE — PROGRESS NOTES
Patient stated he feels short of breath, but was satting 95% on RA. 2L placed for comfort. Patient says he feels short of breath at baseline, and that the oxygen helped him feel better. Will continue to monitor oxygenation closely.

## 2017-11-25 NOTE — PROGRESS NOTES
[facility] Hospitalist Progress Note    Date of Service: 2017    Chief Complaint  77 y.o. male admitted 2017 with right leg pain redness and swelling. He has known peripheral vascular disease and sees Dr. Austin for this,he previously had amputation of his left lower extremity.    Interval Problem Update  Swelling and redness have resolved overnight    Consultants/Specialty  Vascular surgery Dr. Austin    Disposition  home        Review of Systems   Constitutional: Negative for chills, diaphoresis and fever.   HENT: Negative for hearing loss.    Respiratory: Negative for cough, shortness of breath, wheezing and stridor.    Cardiovascular: Positive for claudication and leg swelling. Negative for chest pain and palpitations.        Leg swelling gone today after leg elevation and antibiotics overnight   Gastrointestinal: Negative for constipation, nausea and vomiting.   Genitourinary: Negative for dysuria.   Musculoskeletal: Negative for myalgias.   Skin: Negative for rash.      Physical Exam  Laboratory/Imaging   Hemodynamics  Temp (24hrs), Av.5 °C (97.7 °F), Min:36.3 °C (97.3 °F), Max:36.8 °C (98.2 °F)   Temperature: 36.8 °C (98.2 °F)  Pulse  Av.1  Min: 62  Max: 93 Heart Rate (Monitored): 82  Blood Pressure : 146/67      Respiratory      Respiration: 18, Pulse Oximetry: 94 %, O2 Daily Delivery Respiratory : Silicone Nasal Cannula        RUL Breath Sounds: Diminished, RML Breath Sounds: Diminished, RLL Breath Sounds: Diminished, LAW Breath Sounds: Diminished, LLL Breath Sounds: Diminished    Fluids    Intake/Output Summary (Last 24 hours) at 17 1247  Last data filed at 17 0800   Gross per 24 hour   Intake             1680 ml   Output             1125 ml   Net              555 ml       Nutrition  Orders Placed This Encounter   Procedures   • Diet Order     Standing Status:   Standing     Number of Occurrences:   1     Order Specific Question:   Diet:     Answer:   Cardiac [6]     Physical  Exam   Constitutional: He is oriented to person, place, and time. No distress.   Eyes: Conjunctivae are normal. No scleral icterus.   Neck: Neck supple. No JVD present.   Cardiovascular: Normal rate and regular rhythm.    No murmur heard.  Pulses:       Popliteal pulses are 0 on the right side.        Dorsalis pedis pulses are 0 on the right side.   Pulmonary/Chest: No respiratory distress. He has no wheezes. He has no rales.   Abdominal: Soft. Bowel sounds are normal.   Musculoskeletal: He exhibits no edema.   Neurological: He is alert and oriented to person, place, and time.   Skin: Skin is warm and dry.   Nursing note and vitals reviewed.      Recent Labs      11/24/17   1300  11/25/17   0134   WBC  8.9  9.5   RBC  4.98  4.80   HEMOGLOBIN  14.5  14.0   HEMATOCRIT  42.9  41.2*   MCV  86.1  85.8   MCH  29.1  29.2   MCHC  33.8  34.0   RDW  45.8  44.6   PLATELETCT  273  284   MPV  9.1  9.2     Recent Labs      11/24/17   1052  11/25/17   0134   SODIUM  138  138   POTASSIUM  4.5  4.4   CHLORIDE  108  108   CO2  24  22   GLUCOSE  98  95   BUN  14  14   CREATININE  1.23  1.11   CALCIUM  9.5  8.5     Recent Labs      11/24/17   1300   APTT  29.9                  Assessment/Plan     * Cellulitis- (present on admission)   Assessment & Plan    Continue antibiotics, right lower extremity color is now normal, erythema has resolved as has his edema          Essential hypertension- (present on admission)   Assessment & Plan    Controlled  Will monitor on metoprolol        Chronic systolic heart failure (CMS-HCC)- (present on admission)   Assessment & Plan    No exacerbation  Continue metoprolol and lipitor          Atherosclerosis of renal artery (CMS-[facility])- (present on admission)   Assessment & Plan    stable        Peripheral arterial disease (CMS-[facility])- (present on admission)   Assessment & Plan    S/p left BKA remotely  Right leg also with vascular disease as documented by arterial duplex  Discussed with Dr. Austin, no  surgery at this time, will treat conservatively and he will see the patient on follow up        COPD (chronic obstructive pulmonary disease) (CMS-HCC)- (present on admission)   Assessment & Plan    Compensated at his baseline  nicoderm patch provided  Continue respiratory therapy        Alcoholic polyneuropathy (CMS-HCC)- (present on admission)   Assessment & Plan    gabapentin            Reviewed items::  Labs reviewed, Medications reviewed and Radiology images reviewed  Romero catheter::  No Romero  DVT prophylaxis pharmacological::  Heparin  Ulcer Prophylaxis::  Yes  Antibiotics:  Treating active infection/contamination beyond 24 hours perioperative coverage

## 2017-11-25 NOTE — ASSESSMENT & PLAN NOTE
S/p left BKA remotely  Right leg also with vascular disease as documented by arterial duplex  Discussed with Dr. Austin, no surgery at this time, will treat conservatively and he will see the patient on follow up

## 2017-11-25 NOTE — CONSULTS
DATE OF CONSULTATION: 11/25/2017     REFERRING PHYSICIAN: MD Chaka.     CONSULTING PHYSICIAN: Lance Austin M.D.      REASON FOR CONSULTATION: Cellulitis right leg       HISTORY OF PRESENT ILLNESS: The patient is a 77-year-old gentleman who suffers from severe peripheral arterial disease. The patient has undergone a left below-knee amputation after multiple revascularization attempts. The patient was admitted to the hospital with significant swelling and erythema of his right lower extremity. After elevation and antibiotics for 24-36 hours the edema and erythema has markedly improved. The patient denies rest pain has no tissue loss. Noninvasive arterial studies demonstrate monophasic flow but he does have known chronic disease of that lower extremity. The patient does ambulate with a prosthesis report states had no issues prior to the swelling and erythema.    PAST MEDICAL HISTORY:  has a past medical history of Abnormality of gait; Alcoholic polyneuropathy (CMS-HCC); Aortic valve disorders; Atherosclerosis of renal artery (CMS-HCC); Blood clotting disorder (CMS-HCC) (10/15/15); Bowel habit changes; Breath shortness; CAD (coronary artery disease) (May 2009); Carotid artery stenosis (August 2014); Chronic systolic heart failure (CMS-HCC); COPD; Cough; Dental disorder; Dizziness; Fall; Heart burn; Hiatus hernia syndrome; Hyperlipidemia; Hypertension; Insomnia, unspecified; Lumbago; Myocardial infarct (1995x2); Other specified forms of chronic ischemic heart disease; Peptic ulcer, unspecified site, unspecified as acute or chronic, without mention of hemorrhage, perforation, or obstruction; Peripheral vascular disease, unspecified (February 2012); Pure hypercholesterolemia; Snoring; Stroke (CMS-HCC) (2011); Tobacco use disorder; Transient alteration of awareness; Umbilical hernia without mention of obstruction or gangrene; Unspecified disorder of kidney and ureter; Unspecified hemorrhagic conditions; Unspecified  "hypertensive heart disease without heart failure; and Upper GI bleed.     PAST SURGICAL HISTORY:  has a past surgical history that includes gastroscopy-endo (3/3/2010); endostat (3/3/2010); sclerotheraphy (3/3/2010); cardiac cath, right/left heart; multiple coronary artery bypass (1994); recovery (2/28/2012); recovery (1/15/2015); femoral popliteal bypass (2/11/2015); other (1984); groin exploration (3/11/2015); thrombectomy (3/24/2015); angiogram (3/24/2015); recovery (9/4/2015); femoral popliteal bypass (Left, 10/2/2015); angiogram (10/2/2015); toe amputation (Left, 11/9/2015); incision and drainage general (Left, 11/9/2015); femoral popliteal bypass (Left, 11/13/2015); angiogram (Left, 12/21/2015); femoral tibial bypass (Left, 12/21/2015); knee amputation below (Left, 12/22/2015); stump revision (Left, 5/11/2016); split thickness skin graft (Left, 8/25/2016); mass excision general (Left, 6/8/2017); and flap closure (Left, 6/8/2017).     ALLERGIES:   Allergies   Allergen Reactions   • Neosporin Pain Relieving Unspecified     Pt states \"I get blisters\".        CURRENT MEDICATIONS:   Home Medications     Reviewed by Edna Cameron (Pharmacy Tech) on 11/24/17 at 1356  Med List Status: Complete   Medication Last Dose Status   amitriptyline (ELAVIL) 150 MG Tab 11/23/2017 Active   aspirin EC (ECOTRIN) 81 MG Tablet Delayed Response 11/23/2017 Active   atorvastatin (LIPITOR) 80 MG tablet 11/23/2017 Active   clopidogrel (PLAVIX) 75 MG Tab 11/23/2017 Active   ezetimibe (ZETIA) 10 MG Tab 11/23/2017 Active   gabapentin (NEURONTIN) 300 MG Cap 11/23/2017 Active   metoprolol (LOPRESSOR) 25 MG Tab 11/23/2017 Active   omeprazole (PRILOSEC) 20 MG delayed-release capsule 11/23/2017 Active   ranitidine (ZANTAC) 150 MG Tab 11/23/2017 Active                FAMILY HISTORY:   Family History   Problem Relation Age of Onset   • Heart Disease Mother    • Other Mother      brain surg   • Cancer Brother      melanoma        SOCIAL " HISTORY:   Social History     Social History Main Topics   • Smoking status: Current Every Day Smoker     Packs/day: 1.00     Years: 55.00     Types: Cigarettes   • Smokeless tobacco: Never Used   • Alcohol use No   • Drug use: No      Comment: denies   • Sexual activity: Not Currently       Review of Systems:      Constitutional: Denies fevers, Denies weight changes  Eyes: Denies changes in vision, no eye pain  Ears/Nose/Throat/Mouth: Denies nasal congestion or sore throat   Cardiovascular: Absent pedal pulses right lower extremity   Respiratory: Denies shortness of breath, cough, and wheezing.  Gastrointestinal/Hepatic: Denies abdominal pain, nausea, vomiting, diarrhea, constipation or GI bleeding   Genitourinary: Denies dysuria or frequency  Musculoskeletal/Rheum: No significant swelling right lower extremity minimal if any erythema present. No ulcerations present.  Skin: Denies rash  Neurological: Denies headache, confusion, memory loss or focal weakness/parasthesias  Psychiatric: denies mood disorder   Endocrine: Tila thyroid problems  Heme/Oncology/Lymph Nodes: Denies enlarged lymph nodes, denies brusing or known bleeding disorder  All other systems were reviewed and are negative (AMA/CMS criteria)                  PHYSICAL EXAMINATION:       Constitutional:   Well developed, Well nourished, No acute distress  HENMT:  Normocephalic, Atraumatic, Oropharynx moist mucous membranes, No oral exudates, Nose normal.  No thyromegaly.  Eyes:  EOMI, Conjunctiva normal, No discharge.  Neck:  Normal range of motion, No cervical tenderness,  no JVD.  Cardiovascular:  Absent pedal pulses right lower extremity   Lungs:  Normal breath sounds, breath sounds clear to auscultation bilaterally,  no crackles, no wheezing.   Abdomen: Bowel sounds normal, Soft, No tenderness, No guarding, No rebound, No masses, No hepatosplenomegaly.  Minimal edema right lower extremity with almost no cellulitis.  Neurologic: Alert & oriented x 3,  No focal deficits noted, cranial nerves II through X are grossly intact.  Psychiatric: Affect normal, Judgment normal, Mood normal.        LABORATORY VALUES:   Recent Labs      11/24/17   1300  11/25/17   0134   WBC  8.9  9.5   RBC  4.98  4.80   HEMOGLOBIN  14.5  14.0   HEMATOCRIT  42.9  41.2*   MCV  86.1  85.8   MCH  29.1  29.2   MCHC  33.8  34.0   RDW  45.8  44.6   PLATELETCT  273  284   MPV  9.1  9.2     Recent Labs      11/24/17   1052  11/25/17   0134   SODIUM  138  138   POTASSIUM  4.5  4.4   CHLORIDE  108  108   CO2  24  22   GLUCOSE  98  95   BUN  14  14   CREATININE  1.23  1.11   CALCIUM  9.5  8.5     Recent Labs      11/24/17   1052   ASTSGOT  23   ALTSGPT  24   TBILIRUBIN  0.5   ALKPHOSPHAT  162*   GLOBULIN  3.1     Recent Labs      11/24/17   1300   APTT  29.9        IMAGING:   LE ART DUPLX/IMAG (Specify in Comments Left, Right Or Bilateral)   Final Result      LE VENOUS DUPLEX (Specify in Comments Left, Right Or Bilateral)   Final Result      LE ART/KALPANA   Final Result      LE VENOUS DUPLEX (Specify in Comments Left, Right Or Bilateral)    (Results Pending)   LE ART DUPLX/IMAG    (Results Pending)       IMPRESSION AND PLAN:     Active Hospital Problems    Diagnosis   • Cellulitis [L03.90]     Priority: High   • Atherosclerosis of renal artery (CMS-HCC) [I70.1]     Priority: Medium   • Chronic systolic heart failure (CMS-HCC) [I50.22]     Priority: Medium   • Essential hypertension [I10]     Priority: Medium     ICD-10 transition     • Alcoholic polyneuropathy (CMS-HCC) [G62.1]     Priority: Low   • Peripheral arterial disease (CMS-HCC) [I73.9]   • COPD (chronic obstructive pulmonary disease) (CMS-HCC) [J44.9]     Although the patient does suffer from peripheral arterial disease I believe that this was likely a bout of cellulitis which is resolving nicely with metabolic therapy. He suffers from no rest pain or tissue loss. Recommending continued a course of outpatient oral antibiotics and will follow up  with him in the office approximately 2 weeks.  ____________________________________   Lance Austin M.D.          DD: 11/25/2017   DT: 12:28 PM

## 2017-11-25 NOTE — PROGRESS NOTES
Received report from ER RN and assumed care around 1700.   Pt A+Ox4, no c/o of pain.   Lungs diminished on room air.  BS normoactive, +flatus, -N/V, tolerating cardiac diet, LBM 11/22, voiding well.  Left FA IV in place and patent at this time, running NS at 75 ml/hr.  RLE red and hot to touch, slight weeping.   Pt states he walks with no assist or assistive devices at home.  Patient resting comfortably in bed and has no further questions or complaints at this time.  Call light within reach. Will check in with patient in an hour.     Vitals:    11/24/17 1245 11/24/17 1400 11/24/17 1600 11/24/17 1644   BP: 155/74 145/76 142/67 136/76   Pulse: 79 80 82 81   Resp: 16 16 16 18   Temp:    36.5 °C (97.7 °F)   TempSrc:       SpO2: 94% 95% 95% 94%   Weight:       Height:

## 2017-11-25 NOTE — ASSESSMENT & PLAN NOTE
Continue antibiotics, right lower extremity color is now normal, erythema has resolved as has his edema

## 2017-11-25 NOTE — H&P
HOSPITAL MEDICINE HISTORY/ PHYSICAL    Date of Service:  11/24/2017   7:07 PM       Patient ID:   Name: Galileo Torrez. YOB: 1940. Age: 77 y.o. male. MRN: [id number]    Admitting Attending:  Ever Magana     PCP : FELIX Lebron          Chief Complaint:       Worsening swelling and redness of the right foot    History of Present Illness:    Lore is a 77 y.o. male w/h/o severe peripheral arterial disease and status post amputation of the left lower extremity who presents with above chief complaint. Sensation states that he continues to smoke a large amount of tobaccothat he shouldn't. Patient comes in today because he started having swelling of the leg 2 weeks ago and is somewhat matted himself for not coming in earlier. The swelling started he started having redness of the foot that started on the backside and then moved up to his mid shin and he finally came in today because he was quite concerned. He claims the pain is only mild but the redness is quite concerning to him and he has some decreased sensation of the toes which is chronic and at baseline. He was supposed to see Dr. Austin of vascular surgery in mid December of this year for possible further interventions on this leg. He's never had any vascular runoffs are angiograms or bypasses done of this affected limb. He claims that the left BKA stump is well-healed and his prosthetic leg is working well. He denies any systemic symptoms such as nausea vomiting fevers chills myalgias or any other skin changes except outlined as above. He has not tried any medications at home either for pain or for infection of the skin.    Review of Systems:    Has Review of Systems   Constitutional: Negative for chills and fever.   HENT: Negative for hearing loss.    Eyes: Negative for blurred vision.   Respiratory: Negative for cough and shortness of breath.    Cardiovascular: Positive for leg swelling. Negative for chest pain.  "  Gastrointestinal: Negative for abdominal pain, nausea and vomiting.   Genitourinary: Negative for dysuria and urgency.   Musculoskeletal: Positive for joint pain. Negative for myalgias and neck pain.   Skin: Negative for itching.   Neurological: Negative for dizziness, focal weakness and loss of consciousness.   Psychiatric/Behavioral: Negative for depression.   All other systems reviewed and are negative.    Please see HPI, all other systems were reviewed and are negative (AMA/CMS criteria)              Past Medical/ Family / Social history (PFSH):   Past Medical History:   Diagnosis Date   • Blood clotting disorder (CMS-[facility]) 10/15/15      left leg   • Carotid artery stenosis August 2014    Carotid ultrasound with LICA 50-70% stenosis, YOLANDA <50% stenosis.  Unchanged from 2013.   • Peripheral vascular disease, unspecified February 2012    PCI/stent (Omnilink 7 x 28mm) to the right external iliac.   • Stroke (CMS-[facility]) 2011    \"I don't know when, somebody said I did. A little one\"   • CAD (coronary artery disease) May 2009    LAD diffuse, 80% distal, %, OM1,%, Grafts all occluded LVEF 20%.  No targets for revascularization.   • Abnormality of gait    • Alcoholic polyneuropathy (CMS-HCC)    • Aortic valve disorders    • Atherosclerosis of renal artery (CMS-HCC)    • Bowel habit changes     constipation   • Breath shortness     says from smoking  No O2   • Chronic systolic heart failure (CMS-HCC)    • COPD    • Cough    • Dental disorder     dentures   • Dizziness    • Fall     Multiple falls   • Heart burn    • Hiatus hernia syndrome    • Hyperlipidemia    • Hypertension    • Insomnia, unspecified    • Lumbago    • Myocardial infarct 1995x2    5 stents   • Other specified forms of chronic ischemic heart disease    • Peptic ulcer, unspecified site, unspecified as acute or chronic, without mention of hemorrhage, perforation, or obstruction    • Pure hypercholesterolemia    • Snoring    • Tobacco use disorder "    • Transient alteration of awareness    • Umbilical hernia without mention of obstruction or gangrene    • Unspecified disorder of kidney and ureter    • Unspecified hemorrhagic conditions     blood thinners   • Unspecified hypertensive heart disease without heart failure    • Upper GI bleed      Past Surgical History:   Procedure Laterality Date   • MASS EXCISION GENERAL Left 6/8/2017    Procedure: MASS EXCISION GENERAL - SQUAMOUS CELL - EAR ;  Surgeon: Fuad Moreno M.D.;  Location: SURGERY SAME DAY [facility];  Service:    • FLAP CLOSURE Left 6/8/2017    Procedure: FLAP CLOSURE;  Surgeon: Fuad Moreno M.D.;  Location: SURGERY SAME DAY [facility];  Service:    • SPLIT THICKNESS SKIN GRAFT Left 8/25/2016    Procedure: SPLIT THICKNESS SKIN GRAFT LEG;  Surgeon: Fuad Moreno M.D.;  Location: [facility];  Service:    • STUMP REVISION Left 5/11/2016    Procedure: STUMP REVISION BK DEBRIDEMENT;  Surgeon: Lance Austin M.D.;  Location: [facility];  Service:    • KNEE AMPUTATION BELOW Left 12/22/2015    Procedure: KNEE AMPUTATION BELOW , REMOVAL OF LEFT TIBIAL BYPASS GRAFT;  Surgeon: Lance Austin M.D.;  Location: [facility];  Service:    • ANGIOGRAM Left 12/21/2015    Procedure: ANGIOGRAM, LEG;  Surgeon: Lance Austin M.D.;  Location: [facility];  Service:    • FEMORAL TIBIAL BYPASS Left 12/21/2015    Procedure: FEMORAL TIBIAL BYPASS with Distaflo Graft;  Surgeon: Lance Austin M.D.;  Location: [facility];  Service:    • FEMORAL POPLITEAL BYPASS Left 11/13/2015    Procedure: FEMORAL POPLITEAL ANGIOPLASTY cryo preserve vein placement  ;  Surgeon: Lance Austin M.D.;  Location: [facility];  Service:    • TOE AMPUTATION Left 11/9/2015    Procedure: TOE AMPUTATION FIRST AND SECOND;  Surgeon: Lance Austin M.D.;  Location: [facility];  Service:    • INCISION AND DRAINAGE GENERAL Left 11/9/2015    Procedure: INCISION AND  DRAINAGE GENERAL WASHOUT, & WOUND VAC PLACEMENT LEG  ;  Surgeon: Lance Austin M.D.;  Location: SURGERY [facility];  Service:    • FEMORAL POPLITEAL BYPASS Left 10/2/2015    Procedure: FEMORAL POPLITEAL BYPASS;  Surgeon: Lance Austin M.D.;  Location: SURGERY [facility];  Service:    • ANGIOGRAM  10/2/2015    Procedure: ANGIOGRAM;  Surgeon: Lance Austin M.D.;  Location: SURGERY [facility];  Service:    • RECOVERY  9/4/2015    Procedure: CATH LAB-[facility] W/COROS AND STENT [id number], ANGINA PECTORIS 413.9;  Surgeon: Gerry Surgery;  Location: SURGERY PRE-POST PROC UNIT [facility];  Service:    • THROMBECTOMY  3/24/2015    Performed by Lance Austin M.D. at [facility]   • ANGIOGRAM  3/24/2015    Performed by Lance Austin M.D. at [facility]   • GROIN EXPLORATION  3/11/2015    Performed by Lance Austin M.D. at [facility]   • FEMORAL POPLITEAL BYPASS  2/11/2015    Performed by Lance Austin M.D. at [facility]   • RECOVERY  1/15/2015    Performed by [facility] at [facility]   • RECOVERY  2/28/2012    Performed by SURGERY, IR-RECOVERY at SURGERY SAME DAY [facility]   • GASTROSCOPY-ENDO  3/3/2010    Performed by KALE JUAREZ at ENDOSCOPY [facility]   • ENDOSTAT  3/3/2010    Performed by KALE JUAREZ at ENDOSCOPY [facility]   • SCLEROTHERAPHY  3/3/2010    Performed by KALE JUAREZ at ENDOSCOPY [facility]   • MULTIPLE CORONARY ARTERY BYPASS  1994     CABG x 3, in [place]   • OTHER  1984    LEFT ORBIT FX-SURGERY PIN/PLATE   • CARDIAC CATH, RIGHT/LEFT HEART      STENT     Current Outpatient Medications:  No current facility-administered medications on file prior to encounter.      Current Outpatient Prescriptions on File Prior to Encounter   Medication Sig Dispense Refill   • amitriptyline (ELAVIL) 150 MG Tab Take 2 Tabs by mouth every bedtime. 180 Tab 3   • gabapentin (NEURONTIN) 300 MG  "Cap Take 2 Caps by mouth every bedtime. 180 Cap 0   • ranitidine (ZANTAC) 150 MG Tab Take 1 Tab by mouth 2 times a day. 180 Tab 3   • omeprazole (PRILOSEC) 20 MG delayed-release capsule Take 1 Cap by mouth every day. 90 Cap 3   • atorvastatin (LIPITOR) 80 MG tablet Take 1 Tab by mouth every evening. 90 Tab 3   • metoprolol (LOPRESSOR) 25 MG Tab Take 2 Tabs by mouth 2 times a day. 180 Tab 3   • aspirin EC (ECOTRIN) 81 MG Tablet Delayed Response Take 81 mg by mouth every morning. Indications: Heart Attack       Medication Allergy/Sensitivities:  Allergies   Allergen Reactions   • Neosporin Pain Relieving Unspecified     Pt states \"I get blisters\".     Family History:  Family History   Problem Relation Age of Onset   • Heart Disease Mother    • Other Mother      brain surg   • Cancer Brother      melanoma      Social History:  Continues to smoking tobacco, drinking ETOh    Counseled the patient on the importance of tobacco cessation including the use of chantix, wellbutrin, and hypnosis. Patient is in understanding of this issue. BILL CODE 88044.  NO desire to quit    #################################################################  Physical Exam:   Vitals/ General Appearance:   Weight/BMI: Body mass index is 28.49 kg/m².  Blood pressure 136/76, pulse 81, temperature 36.5 °C (97.7 °F), resp. rate 18, height 1.829 m (6'), weight 95.3 kg (210 lb 1.6 oz), SpO2 94 %.   Vitals:    11/24/17 1245 11/24/17 1400 11/24/17 1600 11/24/17 1644   BP: 155/74 145/76 142/67 136/76   Pulse: 79 80 82 81   Resp: 16 16 16 18   Temp:    36.5 °C (97.7 °F)   TempSrc:       SpO2: 94% 95% 95% 94%   Weight:       Height:        Oxygen Therapy:  Pulse Oximetry: 94 %, O2 (LPM): 0, O2 Delivery: None (Room Air)    Constitutional:  Appears much older than stated age, cracked glasses  HENMT: Normocephalic, atraumatic, b/l ears normal, nose normal  Eyes:  EOMI, conjunctiva normal, no discharge  Neck: no tracheal deviation, supple  Cardiovascular: " normal heart rate, normal rhythm, no murmurs, no rubs or gallops; no cyanosis, clubbing, 1-2+ pitting edema of the right lower jonathon from the midshin distal  Lungs: Respiratory effort is normal, normal breath sounds, breath sounds clear to auscultation b/l, no rales, rhonchi or wheezing  Abdomen: soft, non-tender, no guarding or rebound  Skin: warm, dry, erythema from the dorsum of the right foot already to the midshin circumferential with no open lesions. Peripherally but no palpable DP pulses appreciated  -Extremities-left BKA stump appears well intact  Neurologic: Alert and oriented, strength 5 out of 5 throughout, no focal deficits, CN II-XII normal  Psychiatric: No anxiety or depression    #################################################################  Lab Data Review:    Objective   Recent Results (from the past 24 hour(s))   COMP METABOLIC PANEL    Collection Time: 11/24/17 10:52 AM   Result Value Ref Range    Sodium 138 135 - 145 mmol/L    Potassium 4.5 3.6 - 5.5 mmol/L    Chloride 108 96 - 112 mmol/L    Co2 24 20 - 33 mmol/L    Anion Gap 6.0 0.0 - 11.9    Glucose 98 65 - 99 mg/dL    Bun 14 8 - 22 mg/dL    Creatinine 1.23 0.50 - 1.40 mg/dL    Calcium 9.5 8.5 - 10.5 mg/dL    AST(SGOT) 23 12 - 45 U/L    ALT(SGPT) 24 2 - 50 U/L    Alkaline Phosphatase 162 (H) 30 - 99 U/L    Total Bilirubin 0.5 0.1 - 1.5 mg/dL    Albumin 3.8 3.2 - 4.9 g/dL    Total Protein 6.9 6.0 - 8.2 g/dL    Globulin 3.1 1.9 - 3.5 g/dL    A-G Ratio 1.2 g/dL   LIPASE    Collection Time: 11/24/17 10:52 AM   Result Value Ref Range    Lipase 15 11 - 82 U/L   ESTIMATED GFR    Collection Time: 11/24/17 10:52 AM   Result Value Ref Range    GFR If African American >60 >60 mL/min/1.73 m 2    GFR If Non  57 (A) >60 mL/min/1.73 m 2   CBC WITH DIFFERENTIAL    Collection Time: 11/24/17  1:00 PM   Result Value Ref Range    WBC 8.9 4.8 - 10.8 K/uL    RBC 4.98 4.70 - 6.10 M/uL    Hemoglobin 14.5 14.0 - 18.0 g/dL    Hematocrit 42.9 42.0 -  52.0 %    MCV 86.1 81.4 - 97.8 fL    MCH 29.1 27.0 - 33.0 pg    MCHC 33.8 33.7 - 35.3 g/dL    RDW 45.8 35.9 - 50.0 fL    Platelet Count 273 164 - 446 K/uL    MPV 9.1 9.0 - 12.9 fL    Neutrophils-Polys 64.50 44.00 - 72.00 %    Lymphocytes 24.00 22.00 - 41.00 %    Monocytes 7.60 0.00 - 13.40 %    Eosinophils 2.60 0.00 - 6.90 %    Basophils 1.10 0.00 - 1.80 %    Immature Granulocytes 0.20 0.00 - 0.90 %    Nucleated RBC 0.00 /100 WBC    Neutrophils (Absolute) 5.71 1.82 - 7.42 K/uL    Lymphs (Absolute) 2.13 1.00 - 4.80 K/uL    Monos (Absolute) 0.67 0.00 - 0.85 K/uL    Eos (Absolute) 0.23 0.00 - 0.51 K/uL    Baso (Absolute) 0.10 0.00 - 0.12 K/uL    Immature Granulocytes (abs) 0.02 0.00 - 0.11 K/uL    NRBC (Absolute) 0.00 K/uL   APTT    Collection Time: 11/24/17  1:00 PM   Result Value Ref Range    APTT 29.9 24.7 - 36.0 sec         Imaging/Procedures Review:    LE ART DUPLX/IMAG (Specify in Comments Left, Right Or Bilateral)   Final Result      LE VENOUS DUPLEX (Specify in Comments Left, Right Or Bilateral)   Final Result      LE ART/KALPANA   Final Result      LE VENOUS DUPLEX (Specify in Comments Left, Right Or Bilateral)    (Results Pending)   LE ART DUPLX/IMAG    (Results Pending)     EKG:   per my independant read:  None performed    Assessment and Plan:      * Cellulitis- (present on admission)   Assessment & Plan    -Quite impressive of the right lower jonathon from the midshin distally  -We'll cover with IV antibiotics for strep and staph coverage  -Potentially will need to consult vascular surgery is outpatient vascular surgeon is Dr. Sales for possible anginal runoff  -Arterial studies show no evidence of acute emboli or clot though shows quite impressive obstruction from the midcalf down and has some collaterals  -Continue consider alternative venous duplex  -Pain control blood pressure control and consider Plavix        Essential hypertension- (present on admission)   Assessment & Plan    -Continue outpatient blood  pressure medications currently well-controlled adjust as needed        Chronic systolic heart failure (CMS-HCC)- (present on admission)   Assessment & Plan    -Appears well compensated continue outpatient medications        Atherosclerosis of renal artery (CMS-HCC)- (present on admission)   Assessment & Plan    -At his baseline continue outpatient medications kidney function appears intact        Peripheral arterial disease (CMS-HCC)- (present on admission)   Assessment & Plan    -Quite severe already had a left BKA outpatient [place] surgeon is Dr. Austin arterial studies outlined above  -E venous duplex preliminary study shows no evidence of DVT  -Likely benefit from possible angioplasty or bypass of the right lower extremity  -She was strongly encouraged to stop smoking tobacco even with severe peripheral arterial disease        COPD (chronic obstructive pulmonary disease) (CMS-HCC)- (present on admission)   Assessment & Plan    -At his baseline currently well compensated continue outpatient respiratory therapy protocol bronchodilators        Alcoholic polyneuropathy (CMS-HCC)- (present on admission)   Assessment & Plan    -Continueoutpatientgabapentin              1. Prophylaxis: sc heparin  2. Code: Full code per patient with himself present  3. Dispo: He will be admitted to inpatient for management that is expected to take greater than 2 midnights    This dictation was created using voice recognition software. The accuracy of the dictation is limited to the abilities of the software. I expect there may be some errors of grammar and possibly content.

## 2017-11-25 NOTE — PROGRESS NOTES
Pharmacy Kinetics 77 y.o. male on vancomycin day # 1 2017    Received vancomycin loading dose in the ER.    Indication for Treatment: SSTI    Pertinent history per medical record: Admitted on 2017. Presents with increasing redness to the right lower leg.  Redness has progressively worsened and is spreading up his right leg.  He states he has pain in his right foot.  Patient has a pending appointment with Dr. Sales from vascular surgery on .  He has had a left-sided below-knee amputation from previous peripheral vascular disease. Patient denies trauma.     Other antibiotics: Ceftriaxone     Allergies: Neosporin pain relieving     List concerns for renal function: age, obesity, BKA    Pertinent cultures to date:   17 - none  History of MRSA -     Recent Labs      17   1300   WBC  8.9   NEUTSPOLYS  64.50     Recent Labs      17   1052   BUN  14   CREATININE  1.23   ALBUMIN  3.8       Blood pressure 142/67, pulse 82, temperature 36.7 °C (98 °F), temperature source Temporal, resp. rate 16, height 1.829 m (6'), weight 95.3 kg (210 lb 1.6 oz), SpO2 95 %. Temp (24hrs), Av.7 °C (98 °F), Min:36.7 °C (98 °F), Max:36.7 °C (98 °F)      A/P   1. Vancomycin dose change: Start vancomycin 14 mg/kg q24h (1300mg)  2. Next vancomycin level: Prior to the 3rd or 4th dose or sooner if clinically indicated  3. Goal trough: 12-16 mcg/ml  4. Comments: Reviewed previous vancomycin dosing history in  (pt weighed 80kg and SCr ~0.8). Had a trough of 19 on ~20 mg/kg after 6 days of therapy. Will dose conservatively. Monitor renal. Pharmacy will follow. Vascular consult?    Paul Patino, MaryD, BCPS

## 2017-11-25 NOTE — PROGRESS NOTES
Patient AxO x4, VSS on RA, denies need for pain intervention at this time. Denies n/v, tolerating diet. Redness noted to RLE, 1+ pulse noted. L BKA, patient uses prosthetic at home, motivated to walk later tonight. Patient voiding adequately with urinal. Last BM PTA, patient has BS, not passing gas, which he says is baseline, and refused scheduled stool softeners. Patient also refused his heparin shot, despite education. Patient all needs met at this time, call light in reach.     Patient refuses a bed alarm despite education, patient calls appropriately.

## 2017-11-25 NOTE — CARE PLAN
Problem: Safety  Goal: Will remain free from falls  Outcome: PROGRESSING AS EXPECTED  Patient has a L BKA and ambulates at home with a prosthetic. Patient has not ambulated since admission, and has been educated on calling before getting up. Patient verbalized understanding    Problem: Infection  Goal: Will remain free from infection  Outcome: PROGRESSING AS EXPECTED  Patient has IV antibx scheduled for tmrw, also received doses in the ER

## 2017-11-25 NOTE — CARE PLAN
Problem: Safety  Goal: Will remain free from injury  Outcome: PROGRESSING AS EXPECTED  No injuries or falls at this time. Will continue to reinforce safety precautions at this time.       Problem: Pain Management  Goal: Pain level will decrease to patient's comfort goal  Outcome: PROGRESSING AS EXPECTED  Pt declines pain at this time. Will continue to monitor for any nonverbal/verbal signs of pain.

## 2017-11-26 VITALS
HEIGHT: 72 IN | TEMPERATURE: 97.5 F | RESPIRATION RATE: 18 BRPM | SYSTOLIC BLOOD PRESSURE: 141 MMHG | OXYGEN SATURATION: 94 % | HEART RATE: 82 BPM | DIASTOLIC BLOOD PRESSURE: 73 MMHG | WEIGHT: 210.1 LBS | BODY MASS INDEX: 28.46 KG/M2

## 2017-11-26 PROCEDURE — 700102 HCHG RX REV CODE 250 W/ 637 OVERRIDE(OP): Performed by: INTERNAL MEDICINE

## 2017-11-26 PROCEDURE — 700111 HCHG RX REV CODE 636 W/ 250 OVERRIDE (IP): Performed by: INTERNAL MEDICINE

## 2017-11-26 PROCEDURE — 99239 HOSP IP/OBS DSCHRG MGMT >30: CPT | Performed by: INTERNAL MEDICINE

## 2017-11-26 PROCEDURE — A9270 NON-COVERED ITEM OR SERVICE: HCPCS | Performed by: INTERNAL MEDICINE

## 2017-11-26 RX ORDER — CEFUROXIME AXETIL 500 MG/1
500 TABLET ORAL 2 TIMES DAILY
Qty: 14 TAB | Refills: 0 | Status: SHIPPED | OUTPATIENT
Start: 2017-11-26 | End: 2017-12-03

## 2017-11-26 RX ORDER — DOXYCYCLINE 100 MG/1
100 CAPSULE ORAL 2 TIMES DAILY
Qty: 14 CAP | Refills: 0 | Status: SHIPPED | OUTPATIENT
Start: 2017-11-26 | End: 2017-11-26

## 2017-11-26 RX ORDER — DOXYCYCLINE 100 MG/1
100 CAPSULE ORAL 2 TIMES DAILY
Qty: 14 CAP | Refills: 0 | Status: SHIPPED | OUTPATIENT
Start: 2017-11-26 | End: 2017-12-03

## 2017-11-26 RX ORDER — CEFUROXIME AXETIL 500 MG/1
500 TABLET ORAL 2 TIMES DAILY
Qty: 14 TAB | Refills: 0 | Status: SHIPPED | OUTPATIENT
Start: 2017-11-26 | End: 2017-11-26

## 2017-11-26 RX ADMIN — CEFTRIAXONE 2 G: 2 INJECTION, POWDER, FOR SOLUTION INTRAMUSCULAR; INTRAVENOUS at 08:24

## 2017-11-26 RX ADMIN — FAMOTIDINE 20 MG: 20 TABLET, FILM COATED ORAL at 08:23

## 2017-11-26 RX ADMIN — METOPROLOL TARTRATE 50 MG: 50 TABLET, FILM COATED ORAL at 08:23

## 2017-11-26 RX ADMIN — ASPIRIN 81 MG: 81 TABLET, COATED ORAL at 08:24

## 2017-11-26 ASSESSMENT — COPD QUESTIONNAIRES
DO YOU EVER COUGH UP ANY MUCUS OR PHLEGM?: NO/ONLY WITH OCCASIONAL COLDS OR INFECTIONS
COPD SCREENING SCORE: 7
DURING THE PAST 4 WEEKS HOW MUCH DID YOU FEEL SHORT OF BREATH: MOST  OR ALL OF THE TIME
HAVE YOU SMOKED AT LEAST 100 CIGARETTES IN YOUR ENTIRE LIFE: YES

## 2017-11-26 ASSESSMENT — LIFESTYLE VARIABLES
DO YOU DRINK ALCOHOL: NO
EVER_SMOKED: YES

## 2017-11-26 ASSESSMENT — PAIN SCALES - GENERAL: PAINLEVEL_OUTOF10: 0

## 2017-11-26 ASSESSMENT — PATIENT HEALTH QUESTIONNAIRE - PHQ9
SUM OF ALL RESPONSES TO PHQ9 QUESTIONS 1 AND 2: 0
SUM OF ALL RESPONSES TO PHQ QUESTIONS 1-9: 0
1. LITTLE INTEREST OR PLEASURE IN DOING THINGS: NOT AT ALL
2. FEELING DOWN, DEPRESSED, IRRITABLE, OR HOPELESS: NOT AT ALL

## 2017-11-26 NOTE — PROGRESS NOTES
Patient is refusing  monitoring despite education of importance to monitor oxygen saturation and heart rate.  Patient continues to refuse despite education.

## 2017-11-26 NOTE — RESPIRATORY CARE
COPD EDUCATION by COPD CLINICAL EDUCATOR  11/25/2017  at  4:07 PM by Maria Elena Urias     Patient interviewed by COPD education team.  Patient unable to participate in full program.  Short intervention has been conducted.  A comprehensive packet including information about COPD, treatments, and smoking cessation given. Patient declined stop smoking information he has no desire to quit at this time

## 2017-11-26 NOTE — PROGRESS NOTES
Surgery     Awake alert  Leg improved   OK to discharge from my standpoint     Will Arrange an angiogram as an outpatient     Recommend 10 days of antibiotic therapy for resolving cellulitis right leg.     Lance Austin MD

## 2017-11-26 NOTE — DISCHARGE SUMMARY
CHIEF COMPLAINT ON ADMISSION  Chief Complaint   Patient presents with   • Leg Swelling     x 2 weeks with redness/pain to right lower leg       CODE STATUS  Full Code    HPI & HOSPITAL COURSE  This is a 77 y.o. male here with right leg redness and swelling and has known peripheral vascular disease followed by Dr. Austin and previous left lower extremity amputation. He was admitted and treated with IV rocephin and vancomycin and leg elevation. His edema resolved and redness improved. Dr. Austin did see the patient and agreed to arrange for outpatient angiogram.     Therefore, he is discharged in good and stable condition with close outpatient follow-up.    SPECIFIC OUTPATIENT FOLLOW-UP  Vascular surgery Dr. 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 *      FOLLOW UP  No future appointments.  Lance Austin M.D.  [id number]  [address]  [place] [address]  [phone]    In 2 weeks        MEDICATIONS ON DISCHARGE   Galileo Torrez   Home Medication Instructions [place]:[id number]    Printed on:11/26/17 1099   Medication Information                      amitriptyline (ELAVIL) 150 MG Tab  Take 2 Tabs by mouth every bedtime.             aspirin EC (ECOTRIN) 81 MG Tablet Delayed Response  Take 81 mg by mouth every morning. Indications: Heart Attack             atorvastatin (LIPITOR) 80 MG tablet  Take 1 Tab by mouth every evening.             cefUROXime (CEFTIN) 500 MG Tab  Take 1 Tab by mouth 2 times a day for 7 days.             clopidogrel (PLAVIX) 75 MG  Tab  Take 75 mg by mouth every evening.             doxycycline (MONODOX) 100 MG capsule  Take 1 Cap by mouth 2 times a day for 7 days.             ezetimibe (ZETIA) 10 MG Tab  Take 10 mg by mouth every evening.             gabapentin (NEURONTIN) 300 MG Cap  Take 2 Caps by mouth every bedtime.             metoprolol (LOPRESSOR) 25 MG Tab  Take 2 Tabs by mouth 2 times a day.             omeprazole (PRILOSEC) 20 MG delayed-release capsule  Take 1 Cap by mouth every day.             ranitidine (ZANTAC) 150 MG Tab  Take 1 Tab by mouth 2 times a day.                 DIET  Orders Placed This Encounter   Procedures   • Diet Order     Standing Status:   Standing     Number of Occurrences:   1     Order Specific Question:   Diet:     Answer:   Cardiac [6]   • DISCONTINUE DIET TRAY     Standing Status:   Standing     Number of Occurrences:   1       ACTIVITY  As tolerated.  Weight bearing as tolerated      CONSULTATIONS  Vascular surgery Dr. Austin    PROCEDURES  Right lower extremity arterial duplex study shows:   Moderate stenosis of the distal common femoral artery.   Mid-segment occlusion of the right superficial femoral artery with distal    reconstitution.   Likely severe disease of the posterior tibial and peroneal arteries.   The anterior tibial artery has monophasic flow which could be from the more    proximal disease or disease of the anterior tibial artery itself, although    angiography would be helpful to futher characterize the extent and    distribution of the tibial disease in this patient.   This patient has an KALPANA of 0.47 which indicates moderate disease.    LABORATORY  Lab Results   Component Value Date/Time    SODIUM 138 11/25/2017 01:34 AM    POTASSIUM 4.4 11/25/2017 01:34 AM    CHLORIDE 108 11/25/2017 01:34 AM    CO2 22 11/25/2017 01:34 AM    GLUCOSE 95 11/25/2017 01:34 AM    BUN 14 11/25/2017 01:34 AM    CREATININE 1.11 11/25/2017 01:34 AM        Lab Results   Component Value Date/Time    WBC 9.5  11/25/2017 01:34 AM    HEMOGLOBIN 14.0 11/25/2017 01:34 AM    HEMATOCRIT 41.2 (L) 11/25/2017 01:34 AM    PLATELETCT 284 11/25/2017 01:34 AM        Total time of the discharge process exceeds 45 minutes

## 2017-11-26 NOTE — DISCHARGE INSTRUCTIONS
Discharge Instructions    Discharged to home by car with self. Discharged via wheelchair, hospital escort: Yes.  Special equipment needed: Not Applicable    Be sure to schedule a follow-up appointment with your primary care doctor or any specialists as instructed.     Discharge Plan:   Activity Level: Discussed  Smoking Cessation Offered: Patient Counseled  Confirmed Follow up Appointment: Patient to Call and Schedule Appointment  Confirmed Symptoms Management: Discussed  Medication Reconciliation Updated: Ye  Influenza Vaccine Indication: Patient Refuses    I understand that a diet low in cholesterol, fat, and sodium is recommended for good health. Unless I have been given specific instructions below for another diet, I accept this instruction as my diet prescription.   Other diet: As tolerated    Special Instructions:     · Is patient discharged on Warfarin / Coumadin?   No     · Is patient Post Blood Transfusion?  No    Depression / Suicide Risk    As you are discharged from this [facility] facility, it is important to learn how to keep safe from harming yourself.    Recognize the warning signs:  · Abrupt changes in personality, positive or negative- including increase in energy   · Giving away possessions  · Change in eating patterns- significant weight changes-  positive or negative  · Change in sleeping patterns- unable to sleep or sleeping all the time   · Unwillingness or inability to communicate  · Depression  · Unusual sadness, discouragement and loneliness  · Talk of wanting to die  · Neglect of personal appearance   · Rebelliousness- reckless behavior  · Withdrawal from people/activities they love  · Confusion- inability to concentrate     If you or a loved one observes any of these behaviors or has concerns about self-harm, here's what you can do:  · Talk about it- your feelings and reasons for harming yourself  · Remove any means that you might use to hurt yourself (examples: pills, rope, extension  cords, firearm)  · Get professional help from the community (Mental Health, Substance Abuse, psychological counseling)  · Do not be alone:Call your Safe Contact- someone whom you trust who will be there for you.  · Call your local CRISIS HOTLINE 740-3484 or 213-907-3803  · Call your local Children's Mobile Crisis Response Team [place] (322) 901-9999 or www.[employer]  · Call the toll free National Suicide Prevention Hotlines   · National Suicide Prevention Lifeline 009-643-PJLZ (5237)  · National Hope Line Network 800-SUICIDE (648-0759)

## 2017-11-26 NOTE — PROGRESS NOTES
Pharmacy Kinetics 77 y.o. male on vancomycin day # 3   2017    Currently on Vancomycin 1300 mg iv q24hr (1200)     Indication for Treatment: SSTI     Pertinent history per medical record: Admitted on 2017. Presents with increasing redness to the right lower leg.  Redness has progressively worsened and is spreading up his right leg.  He states he has pain in his right foot.  Patient has a pending appointment with Dr. Sales from vascular surgery on .  He has had a left-sided below-knee amputation from previous peripheral vascular disease. 17:Progress note: MD Sales:  Recommending continued a course of outpatient oral antibiotics and will follow up with him in the office approximately 2 weeks.     Other antibiotics: Ceftriaxone      Allergies: Neosporin pain relieving      List concerns for renal function: age, obesity, BKA     Pertinent cultures to date:   None @ this time    Recent Labs      17   1300  17   0134   WBC  8.9  9.5   NEUTSPOLYS  64.50  62.60     Recent Labs      17   1052  17   0134   BUN  14  14   CREATININE  1.23  1.11   ALBUMIN  3.8   --      No results for input(s): VANCOTROUGH, VANCOPEAK, VANCORANDOM in the last 72 hours.  Intake/Output Summary (Last 24 hours) at 17 0809  Last data filed at 17 0400   Gross per 24 hour   Intake             1680 ml   Output             2300 ml   Net             -620 ml      Blood pressure 148/51, pulse 66, temperature 36.6 °C (97.8 °F), resp. rate 17, height 1.829 m (6'), weight 95.3 kg (210 lb 1.6 oz), SpO2 95 %. Temp (24hrs), Av.5 °C (97.7 °F), Min:36.2 °C (97.1 °F), Max:36.7 °C (98.1 °F)      A/P        1. Vancomycin dose change: no change       2. Next vancomycin level: 17@1130  3. Goal trough: 12-16 mcg/mL   4. Comments: No leukocytosis, afebrile,no cultures at this time. Level today. General surgery recommending 2 wks oral abx, clinical picture improving.      Edy Aldana PharmD BCPS  AAHIVP

## 2017-11-26 NOTE — PROGRESS NOTES
Patient non-compliant with bed alarm and calling before getting out of bed.  Patient continues to get out of bed without calling, and is becoming uncooperative with staff.  Patient removed strip alarm.  RN educated patient on necessity of bed alarm due to high fall risk.  Bed alarm replace and re-activated.

## 2017-11-26 NOTE — PROGRESS NOTES
RN entered patient room for morning medications, found bed alarm unplugged from wall and strip alarm removed from under patient.  Patient refusing RN to replace bed alarm.  RN educated on use of call light and necessity of calling before getting out of bed.  No evidence of learning noted.  Patient extremely uncooperative with staff.

## 2017-11-26 NOTE — PROGRESS NOTES
"Assumed care of patient.  Patient attempted to get out of chair x2 without assistance.  Bed alarm placed and activated.  Patient expresses frustration and vocalizes wanting to leave AMA, stating \"this was a mess up\" and \"you are not even doing anything for me right now.\" RN educated patient on importance of IV abx for current infection, and the risks of leaving against medical advise.  Patient agreeable at this time to stay over night and speak to MD in morning.      "

## 2018-01-09 ENCOUNTER — APPOINTMENT (OUTPATIENT)
Dept: ADMISSIONS | Facility: MEDICAL CENTER | Age: 78
End: 2018-01-09
Attending: SURGERY
Payer: MEDICARE

## 2018-01-09 DIAGNOSIS — Z01.812 PRE-OPERATIVE LABORATORY EXAMINATION: ICD-10-CM

## 2018-01-11 ENCOUNTER — HOSPITAL ENCOUNTER (OUTPATIENT)
Facility: MEDICAL CENTER | Age: 78
End: 2018-01-11
Attending: SURGERY | Admitting: SURGERY
Payer: MEDICARE

## 2018-01-11 ENCOUNTER — HOSPITAL ENCOUNTER (OUTPATIENT)
Dept: RADIOLOGY | Facility: MEDICAL CENTER | Age: 78
End: 2018-01-11
Attending: SURGERY | Admitting: SURGERY
Payer: MEDICARE

## 2018-01-11 VITALS
OXYGEN SATURATION: 92 % | HEIGHT: 72 IN | WEIGHT: 209.88 LBS | TEMPERATURE: 97.2 F | DIASTOLIC BLOOD PRESSURE: 58 MMHG | RESPIRATION RATE: 17 BRPM | HEART RATE: 90 BPM | SYSTOLIC BLOOD PRESSURE: 140 MMHG | BODY MASS INDEX: 28.43 KG/M2

## 2018-01-11 DIAGNOSIS — I70.203 ATHEROSCLEROSIS OF NATIVE ARTERY OF BOTH LOWER EXTREMITIES, WITH UNSPECIFIED PRESENCE OF CLINICAL MANIFESTATION (HCC): ICD-10-CM

## 2018-01-11 LAB
ANION GAP SERPL CALC-SCNC: 8 MMOL/L (ref 0–11.9)
BASOPHILS # BLD AUTO: 1.2 % (ref 0–1.8)
BASOPHILS # BLD: 0.12 K/UL (ref 0–0.12)
BUN SERPL-MCNC: 10 MG/DL (ref 8–22)
CALCIUM SERPL-MCNC: 8.6 MG/DL (ref 8.5–10.5)
CHLORIDE SERPL-SCNC: 105 MMOL/L (ref 96–112)
CO2 SERPL-SCNC: 25 MMOL/L (ref 20–33)
CREAT SERPL-MCNC: 1.28 MG/DL (ref 0.5–1.4)
EOSINOPHIL # BLD AUTO: 0.4 K/UL (ref 0–0.51)
EOSINOPHIL NFR BLD: 4.1 % (ref 0–6.9)
ERYTHROCYTE [DISTWIDTH] IN BLOOD BY AUTOMATED COUNT: 44.4 FL (ref 35.9–50)
GLUCOSE SERPL-MCNC: 102 MG/DL (ref 65–99)
HCT VFR BLD AUTO: 42.6 % (ref 42–52)
HGB BLD-MCNC: 14.3 G/DL (ref 14–18)
IMM GRANULOCYTES # BLD AUTO: 0.03 K/UL (ref 0–0.11)
IMM GRANULOCYTES NFR BLD AUTO: 0.3 % (ref 0–0.9)
LYMPHOCYTES # BLD AUTO: 2.11 K/UL (ref 1–4.8)
LYMPHOCYTES NFR BLD: 21.4 % (ref 22–41)
MCH RBC QN AUTO: 28.8 PG (ref 27–33)
MCHC RBC AUTO-ENTMCNC: 33.6 G/DL (ref 33.7–35.3)
MCV RBC AUTO: 85.9 FL (ref 81.4–97.8)
MONOCYTES # BLD AUTO: 0.9 K/UL (ref 0–0.85)
MONOCYTES NFR BLD AUTO: 9.1 % (ref 0–13.4)
NEUTROPHILS # BLD AUTO: 6.3 K/UL (ref 1.82–7.42)
NEUTROPHILS NFR BLD: 63.9 % (ref 44–72)
NRBC # BLD AUTO: 0 K/UL
NRBC BLD-RTO: 0 /100 WBC
PLATELET # BLD AUTO: 290 K/UL (ref 164–446)
PMV BLD AUTO: 8.7 FL (ref 9–12.9)
POTASSIUM SERPL-SCNC: 5.2 MMOL/L (ref 3.6–5.5)
RBC # BLD AUTO: 4.96 M/UL (ref 4.7–6.1)
SODIUM SERPL-SCNC: 138 MMOL/L (ref 135–145)
WBC # BLD AUTO: 9.9 K/UL (ref 4.8–10.8)

## 2018-01-11 PROCEDURE — 36246 INS CATH ABD/L-EXT ART 2ND: CPT

## 2018-01-11 PROCEDURE — 700117 HCHG RX CONTRAST REV CODE 255: Performed by: SURGERY

## 2018-01-11 PROCEDURE — 700101 HCHG RX REV CODE 250

## 2018-01-11 PROCEDURE — 80048 BASIC METABOLIC PNL TOTAL CA: CPT

## 2018-01-11 PROCEDURE — 700111 HCHG RX REV CODE 636 W/ 250 OVERRIDE (IP)

## 2018-01-11 PROCEDURE — 160002 HCHG RECOVERY MINUTES (STAT)

## 2018-01-11 PROCEDURE — 85025 COMPLETE CBC W/AUTO DIFF WBC: CPT

## 2018-01-11 RX ORDER — SODIUM CHLORIDE 9 MG/ML
500 INJECTION, SOLUTION INTRAVENOUS
Status: ACTIVE | OUTPATIENT
Start: 2018-01-11 | End: 2018-01-11

## 2018-01-11 RX ORDER — MIDAZOLAM HYDROCHLORIDE 1 MG/ML
.5-2 INJECTION INTRAMUSCULAR; INTRAVENOUS PRN
Status: ACTIVE | OUTPATIENT
Start: 2018-01-11 | End: 2018-01-11

## 2018-01-11 RX ORDER — LIDOCAINE HYDROCHLORIDE 10 MG/ML
INJECTION, SOLUTION INFILTRATION; PERINEURAL
Status: COMPLETED
Start: 2018-01-11 | End: 2018-01-11

## 2018-01-11 RX ORDER — HEPARIN SODIUM,PORCINE 1000/ML
VIAL (ML) INJECTION
Status: COMPLETED
Start: 2018-01-11 | End: 2018-01-11

## 2018-01-11 RX ORDER — PROTAMINE SULFATE 10 MG/ML
INJECTION, SOLUTION INTRAVENOUS
Status: COMPLETED
Start: 2018-01-11 | End: 2018-01-11

## 2018-01-11 RX ORDER — IODIXANOL 270 MG/ML
95 INJECTION, SOLUTION INTRAVASCULAR ONCE
Status: COMPLETED | OUTPATIENT
Start: 2018-01-11 | End: 2018-01-11

## 2018-01-11 RX ORDER — HEPARIN SODIUM 1000 [USP'U]/ML
5000 INJECTION, SOLUTION INTRAVENOUS; SUBCUTANEOUS ONCE
Status: COMPLETED | OUTPATIENT
Start: 2018-01-11 | End: 2018-01-11

## 2018-01-11 RX ORDER — MIDAZOLAM HYDROCHLORIDE 1 MG/ML
INJECTION INTRAMUSCULAR; INTRAVENOUS
Status: COMPLETED
Start: 2018-01-11 | End: 2018-01-11

## 2018-01-11 RX ADMIN — MIDAZOLAM HYDROCHLORIDE 1 MG: 1 INJECTION INTRAMUSCULAR; INTRAVENOUS at 10:50

## 2018-01-11 RX ADMIN — IODIXANOL 95 ML: 270 INJECTION, SOLUTION INTRAVASCULAR at 12:00

## 2018-01-11 RX ADMIN — FENTANYL CITRATE 25 MCG: 50 INJECTION, SOLUTION INTRAMUSCULAR; INTRAVENOUS at 10:50

## 2018-01-11 RX ADMIN — HEPARIN SODIUM 5000 UNITS: 1000 INJECTION, SOLUTION INTRAVENOUS; SUBCUTANEOUS at 11:05

## 2018-01-11 RX ADMIN — LIDOCAINE HYDROCHLORIDE: 10 INJECTION, SOLUTION INFILTRATION; PERINEURAL at 10:53

## 2018-01-11 RX ADMIN — MIDAZOLAM 1 MG: 1 INJECTION INTRAMUSCULAR; INTRAVENOUS at 10:50

## 2018-01-11 ASSESSMENT — PAIN SCALES - GENERAL
PAINLEVEL_OUTOF10: 0

## 2018-01-11 NOTE — OR NURSING
1149 Patient arrived from IR s/p abdominal aortogram with Angioplasty bilateral groin dressing clean dry intact, patient denies pain, s.o.b, plan of care discussed with patient agreeable.  1231 patient tolerating oral fluids and snacks.   1330 bilateral groin dressing clean dry intact  1400 bilateral groin dressing clean dry intact.  1508 bilateral groin dressing clean dry intact, vss, no c/o pain.  1545 discharge instructions given to patient, patient verbalize understanding of the orders, iv discontinued tip intact, prior to dc vss, no c/o cp, s.o.b, bilateral groin dressing clean dry intact.   1552 patient escorted via w/c with all his personal belongings.

## 2018-01-11 NOTE — DISCHARGE INSTRUCTIONS
ACTIVITY: Rest and take it easy for the first 24 hours.  A responsible adult is recommended to remain with you during that time.  It is normal to feel sleepy.  We encourage you to not do anything that requires balance, judgment or coordination.    MILD FLU-LIKE SYMPTOMS ARE NORMAL. YOU MAY EXPERIENCE GENERALIZED MUSCLE ACHES, THROAT IRRITATION, HEADACHE AND/OR SOME NAUSEA.    FOR 24 HOURS DO NOT:  Drive, operate machinery or run household appliances.  Drink beer or alcoholic beverages.   Make important decisions or sign legal documents.    SPECIAL INSTRUCTIONS: follow up with primary care physician as needed  If you experience chest pain, s.o.b call 911 return to ER   Follow up with Dr orantes call with any questions [phone]    DIET: To avoid nausea, slowly advance diet as tolerated, avoiding spicy or greasy foods for the first day.  Add more substantial food to your diet according to your physician's instructions.  Babies can be fed formula or breast milk as soon as they are hungry.  INCREASE FLUIDS AND FIBER TO AVOID CONSTIPATION.    SURGICAL DRESSING/BATHING: keep dressing clean dry intact for 48 hours, you may remove dressing after 48 hours.    FOLLOW-UP APPOINTMENT:  A follow-up appointment should be arranged with your doctor in [phone]; call to schedule.    You should CALL YOUR PHYSICIAN if you develop:  Fever greater than 101 degrees F.  Pain not relieved by medication, or persistent nausea or vomiting.  Excessive bleeding (blood soaking through dressing) or unexpected drainage from the wound.  Extreme redness or swelling around the incision site, drainage of pus or foul smelling drainage.  Inability to urinate or empty your bladder within 8 hours.  Problems with breathing or chest pain.    You should call 911 if you develop problems with breathing or chest pain.  If you are unable to contact your doctor or surgical center, you should go to the nearest emergency room or urgent care center.  Physician's  telephone #: [phone]    If any questions arise, call your doctor.  If your doctor is not available, please feel free to call the Surgical Center at [phone].  The Center is open Monday through Friday from 7AM to 7PM.  You can also call the HEALTH HOTLINE open 24 hours/day, 7 days/week and speak to a nurse at [phone], or toll free at [phone].    A registered nurse may call you a few days after your surgery to see how you are doing after your procedure.    MEDICATIONS: Resume taking daily medication.  Take prescribed pain medication with food.  If no medication is prescribed, you may take non-aspirin pain medication if needed.  PAIN MEDICATION CAN BE VERY CONSTIPATING.  Take a stool softener or laxative such as senokot, pericolace, or milk of magnesia if needed.  Angiogram, Care After  Refer to this sheet in the next few weeks. These instructions provide you with information about caring for yourself after your procedure. Your health care provider may also give you more specific instructions. Your treatment has been planned according to current medical practices, but problems sometimes occur. Call your health care provider if you have any problems or questions after your procedure.  WHAT TO EXPECT AFTER THE PROCEDURE  After your procedure, it is typical to have the following:  · Bruising at the catheter insertion site that usually fades within 1-2 weeks.  · Blood collecting in the tissue (hematoma) that may be painful to the touch. It should usually decrease in size and tenderness within 1-2 weeks.  HOME CARE INSTRUCTIONS  · Take medicines only as directed by your health care provider.  · You may shower 24-48 hours after the procedure or as directed by your health care provider. Remove the bandage (dressing) and gently wash the site with plain soap and water. Pat the area dry with a clean towel. Do not rub the site, because this may cause bleeding.  · Do not take baths, swim, or use a hot tub until  your health care provider approves.  · Check your insertion site every day for redness, swelling, or drainage.  · Do not apply powder or lotion to the site.  · Do not lift over 10 lb (4.5 kg) for 5 days after your procedure or as directed by your health care provider.  · Ask your health care provider when it is okay to:  ¨ Return to work or school.  ¨ Resume usual physical activities or sports.  ¨ Resume sexual activity.  · Do not drive home if you are discharged the same day as the procedure. Have someone else drive you.  · You may drive 24 hours after the procedure unless otherwise instructed by your health care provider.  · Do not operate machinery or power tools for 24 hours after the procedure or as directed by your health care provider.  · If your procedure was done as an outpatient procedure, which means that you went home the same day as your procedure, a responsible adult should be with you for the first 24 hours after you arrive home.  · Keep all follow-up visits as directed by your health care provider. This is important.  SEEK MEDICAL CARE IF:  · You have a fever.  · You have chills.  · You have increased bleeding from the catheter insertion site. Hold pressure on the site.  SEEK IMMEDIATE MEDICAL CARE IF:  · You have unusual pain at the catheter insertion site.  · You have redness, warmth, or swelling at the catheter insertion site.  · You have drainage (other than a small amount of blood on the dressing) from the catheter insertion site.  · The catheter insertion site is bleeding, and the bleeding does not stop after 30 minutes of holding steady pressure on the site.  · The area near or just beyond the catheter insertion site becomes pale, cool, tingly, or numb.     This information is not intended to replace advice given to you by your health care provider. Make sure you discuss any questions you have with your health care provider.     Document Released: 07/06/2006 Document Revised: 01/08/2016  Document Reviewed: 07/06/2015  [employer] Interactive Patient Education ©2016 [employer]      If your physician has prescribed pain medication that includes Acetaminophen (Tylenol), do not take additional Acetaminophen (Tylenol) while taking the prescribed medication.    Depression / Suicide Risk    As you are discharged from this [facility] facility, it is important to learn how to keep safe from harming yourself.    Recognize the warning signs:  · Abrupt changes in personality, positive or negative- including increase in energy   · Giving away possessions  · Change in eating patterns- significant weight changes-  positive or negative  · Change in sleeping patterns- unable to sleep or sleeping all the time   · Unwillingness or inability to communicate  · Depression  · Unusual sadness, discouragement and loneliness  · Talk of wanting to die  · Neglect of personal appearance   · Rebelliousness- reckless behavior  · Withdrawal from people/activities they love  · Confusion- inability to concentrate     If you or a loved one observes any of these behaviors or has concerns about self-harm, here's what you can do:  · Talk about it- your feelings and reasons for harming yourself  · Remove any means that you might use to hurt yourself (examples: pills, rope, extension cords, firearm)  · Get professional help from the community (Mental Health, Substance Abuse, psychological counseling)  · Do not be alone:Call your Safe Contact- someone whom you trust who will be there for you.  · Call your local CRISIS HOTLINE 689-2436 or 788-799-9211  · Call your local Children's Mobile Crisis Response Team [place] (188) 444-0567 or www.[place]  · Call the toll free National Suicide Prevention Hotlines   · National Suicide Prevention Lifeline 167-618-RCRF (6115)  · National Hope Line Network 800-SUICIDE (473-2894)

## 2018-01-11 NOTE — OP REPORT
DATE OF SERVICE:  01/11/2018    PREPROCEDURE DIAGNOSIS:  Severe peripheral arterial disease.    POSTPROCEDURE DIAGNOSIS:  Severe peripheral arterial disease.    PROCEDURES PERFORMED.  1.  Ultrasound-guided access, right common femoral artery.  2.  Catheter placement, third order artery.  3.  Aortogram.  4.  Bilateral lower extremity runoff.  5.  Angioplasty of left common femoral artery.    SURGEON:  Lance Austin MD    FIRST ASSISTANT:  None.    ANESTHESIA:  Sedation.    PROCEDURE IN DETAIL:  Patient was taken to the endovascular suite at [facility], placed in the supine position.  After adequate   sedation, the patient's bilateral groins were shaved, prepped and draped in   sterile fashion.  Using a thin-wall access needle ultrasound guidance, entry   was gained into the right common femoral artery and a 4-Kyrgyz sheath was   placed.  A flush angiogram catheter was brought up to the pararenal aorta and   aortogram was performed.  Catheter was then brought down to the aortic   bifurcation and bilateral lower extremity images were performed.    FINDINGS:  The patient's infrarenal aorta is widely patent.  Bilateral common   iliac arteries are widely patent.  There are stents in both iliac arteries,   which are widely patent.  Going down the right side, the patient has a widely   patent profunda femoris artery.  The superficial femoral artery is occluded.    Has multiple stents within the superficial femoral artery, but that artery   does go on to occlude and has multiple areas of high-grade stenoses   throughout.  There is reconstitution of the popliteal artery above the knee   with dominant anterior tibial and peroneal artery runoff posterior tibial   artery appears to be occluded.  On the left side, the patient has a widely   patent common iliac and external iliac artery.  The superficial femoral artery   is occluded on the left side and the profunda femoris artery is also patent,   but  there is a high-grade stenosis in the common femoral artery just proximal   to the profunda femoris artery.  The patient has a left below-knee amputation.    Superficial femoral artery as noted was occluded.  5000 units of heparin was   administered and a sheath exchange took place for a 5-Sami destination   sheath that was passed over the bifurcation over wire guidance into the   contralateral external iliac artery reaching third order status.  First, a 5   mm x 2 cm balloon was negotiated across the area of stenosis and angioplasty   was performed.  Completion angiogram demonstrated residual stenosis and a 6x2   mm balloon was then used to angioplasty that lesion.  Completion angiogram   demonstrated significant improvement with approximately 20% residual stenosis.    Next, I was going to attempt to try to negotiate across the superficial   femoral artery occlusion on the right side.  I did attempt to gain access in   the right common femoral region; however, the patient has had significant   surgery within that groin and I was unable to pass the catheter through the   scar tissue, despite stiff wire and dilator saupport.  They felt that the risk   of access site complication was going to be too great and therefore that was   abandoned.  All sheaths, catheters, wires were retrieved.  Pressure was held   to site.  A StarClose was used on the right side, pulled that sheath and we   will arrange likely for a right femoral to popliteal artery bypass using   saphenous vein.  We will discuss this with the patient.       ____________________________________     MD JOSE KEN / XANDER    DD:  01/11/2018 11:42:06  DT:  01/11/2018 13:37:34    D#:  [id number]  Job#:  [id number]

## 2018-01-11 NOTE — PROGRESS NOTES
IR Procedure Note:    Site Marked and Confirmed with MD, patient and RN pre procedure. Dr. Austin performed an abdominal aortogram with Angioplasty.  The pt tolerated the procedure well; ETCo2 baseline 18, with consistent waveform during the procedure.  Gauze and tegaderm and starclose applied to right groin, CDI and soft.  Pt alert and verbally appropriate post procedure, vital signs stable during procedure and transport, see flow sheet for vital signs.  Report given to Nela CATALAN.  RN transported pt to PPU.      Abbott Starclose REF: [id number].  LOT: [id number]

## 2018-02-07 ENCOUNTER — HOSPITAL ENCOUNTER (INPATIENT)
Facility: MEDICAL CENTER | Age: 78
LOS: 2 days | DRG: 254 | End: 2018-02-09
Attending: SURGERY | Admitting: SURGERY
Payer: MEDICARE

## 2018-02-07 ENCOUNTER — APPOINTMENT (OUTPATIENT)
Dept: RADIOLOGY | Facility: MEDICAL CENTER | Age: 78
DRG: 254 | End: 2018-02-07
Attending: SURGERY
Payer: MEDICARE

## 2018-02-07 DIAGNOSIS — G89.18 POST-OPERATIVE PAIN: ICD-10-CM

## 2018-02-07 PROBLEM — I70.229: Status: ACTIVE | Noted: 2018-02-07

## 2018-02-07 LAB — EKG IMPRESSION: NORMAL

## 2018-02-07 PROCEDURE — A9270 NON-COVERED ITEM OR SERVICE: HCPCS | Performed by: NURSE PRACTITIONER

## 2018-02-07 PROCEDURE — 93010 ELECTROCARDIOGRAM REPORT: CPT | Performed by: INTERNAL MEDICINE

## 2018-02-07 PROCEDURE — 700102 HCHG RX REV CODE 250 W/ 637 OVERRIDE(OP)

## 2018-02-07 PROCEDURE — 160036 HCHG PACU - EA ADDL 30 MINS PHASE I: Performed by: SURGERY

## 2018-02-07 PROCEDURE — 160002 HCHG RECOVERY MINUTES (STAT): Performed by: SURGERY

## 2018-02-07 PROCEDURE — 501838 HCHG SUTURE GENERAL: Performed by: SURGERY

## 2018-02-07 PROCEDURE — 160048 HCHG OR STATISTICAL LEVEL 1-5: Performed by: SURGERY

## 2018-02-07 PROCEDURE — 500697 HCHG HEMOCLIP, LARGE (ORANGE): Performed by: SURGERY

## 2018-02-07 PROCEDURE — 500700 HCHG HEMOCLIP, SMALL (RED): Performed by: SURGERY

## 2018-02-07 PROCEDURE — 160029 HCHG SURGERY MINUTES - 1ST 30 MINS LEVEL 4: Performed by: SURGERY

## 2018-02-07 PROCEDURE — C1725 CATH, TRANSLUMIN NON-LASER: HCPCS | Performed by: SURGERY

## 2018-02-07 PROCEDURE — 700101 HCHG RX REV CODE 250

## 2018-02-07 PROCEDURE — 160035 HCHG PACU - 1ST 60 MINS PHASE I: Performed by: SURGERY

## 2018-02-07 PROCEDURE — 160009 HCHG ANES TIME/MIN: Performed by: SURGERY

## 2018-02-07 PROCEDURE — A9270 NON-COVERED ITEM OR SERVICE: HCPCS

## 2018-02-07 PROCEDURE — 700102 HCHG RX REV CODE 250 W/ 637 OVERRIDE(OP): Performed by: NURSE PRACTITIONER

## 2018-02-07 PROCEDURE — 700111 HCHG RX REV CODE 636 W/ 250 OVERRIDE (IP)

## 2018-02-07 PROCEDURE — 160041 HCHG SURGERY MINUTES - EA ADDL 1 MIN LEVEL 4: Performed by: SURGERY

## 2018-02-07 PROCEDURE — 93005 ELECTROCARDIOGRAM TRACING: CPT | Performed by: SURGERY

## 2018-02-07 PROCEDURE — 501445 HCHG STAPLER, SKIN DISP: Performed by: SURGERY

## 2018-02-07 PROCEDURE — 500698 HCHG HEMOCLIP, MEDIUM: Performed by: SURGERY

## 2018-02-07 PROCEDURE — 700101 HCHG RX REV CODE 250: Performed by: NURSE PRACTITIONER

## 2018-02-07 PROCEDURE — 110454 HCHG SHELL REV 250: Performed by: SURGERY

## 2018-02-07 PROCEDURE — 501837 HCHG SUTURE CV: Performed by: SURGERY

## 2018-02-07 PROCEDURE — 041K0JL BYPASS RIGHT FEMORAL ARTERY TO POPLITEAL ARTERY WITH SYNTHETIC SUBSTITUTE, OPEN APPROACH: ICD-10-PCS | Performed by: SURGERY

## 2018-02-07 PROCEDURE — 770006 HCHG ROOM/CARE - MED/SURG/GYN SEMI*

## 2018-02-07 PROCEDURE — C1768 GRAFT, VASCULAR: HCPCS | Performed by: SURGERY

## 2018-02-07 PROCEDURE — 700111 HCHG RX REV CODE 636 W/ 250 OVERRIDE (IP): Performed by: NURSE PRACTITIONER

## 2018-02-07 DEVICE — GRAFT GORE PROPATEN 6MMX80CM: Type: IMPLANTABLE DEVICE | Status: FUNCTIONAL

## 2018-02-07 RX ORDER — ACETAMINOPHEN 500 MG
1000 TABLET ORAL EVERY 6 HOURS
Status: DISCONTINUED | OUTPATIENT
Start: 2018-02-07 | End: 2018-02-09 | Stop reason: HOSPADM

## 2018-02-07 RX ORDER — DEXTROSE MONOHYDRATE, SODIUM CHLORIDE, AND POTASSIUM CHLORIDE 50; 1.49; 4.5 G/1000ML; G/1000ML; G/1000ML
INJECTION, SOLUTION INTRAVENOUS CONTINUOUS
Status: DISCONTINUED | OUTPATIENT
Start: 2018-02-07 | End: 2018-02-09 | Stop reason: HOSPADM

## 2018-02-07 RX ORDER — LABETALOL HYDROCHLORIDE 5 MG/ML
5-10 INJECTION, SOLUTION INTRAVENOUS EVERY 4 HOURS PRN
Status: DISCONTINUED | OUTPATIENT
Start: 2018-02-07 | End: 2018-02-09 | Stop reason: HOSPADM

## 2018-02-07 RX ORDER — HEPARIN SODIUM,PORCINE 1000/ML
VIAL (ML) INJECTION
Status: DISCONTINUED | OUTPATIENT
Start: 2018-02-07 | End: 2018-02-07 | Stop reason: HOSPADM

## 2018-02-07 RX ORDER — GABAPENTIN 300 MG/1
600 CAPSULE ORAL
Status: DISCONTINUED | OUTPATIENT
Start: 2018-02-07 | End: 2018-02-09 | Stop reason: HOSPADM

## 2018-02-07 RX ORDER — OMEPRAZOLE 20 MG/1
20 CAPSULE, DELAYED RELEASE ORAL DAILY
Status: DISCONTINUED | OUTPATIENT
Start: 2018-02-08 | End: 2018-02-09 | Stop reason: HOSPADM

## 2018-02-07 RX ORDER — MORPHINE SULFATE 4 MG/ML
1-5 INJECTION, SOLUTION INTRAMUSCULAR; INTRAVENOUS
Status: DISCONTINUED | OUTPATIENT
Start: 2018-02-07 | End: 2018-02-07

## 2018-02-07 RX ORDER — LIDOCAINE HYDROCHLORIDE 10 MG/ML
0.5 INJECTION, SOLUTION INFILTRATION; PERINEURAL
Status: ACTIVE | OUTPATIENT
Start: 2018-02-07 | End: 2018-02-08

## 2018-02-07 RX ORDER — AMITRIPTYLINE HYDROCHLORIDE 100 MG/1
300 TABLET ORAL
Status: DISCONTINUED | OUTPATIENT
Start: 2018-02-07 | End: 2018-02-09 | Stop reason: HOSPADM

## 2018-02-07 RX ORDER — OXYCODONE HYDROCHLORIDE 5 MG/1
5 TABLET ORAL
Status: DISCONTINUED | OUTPATIENT
Start: 2018-02-07 | End: 2018-02-09 | Stop reason: HOSPADM

## 2018-02-07 RX ORDER — METOPROLOL TARTRATE 50 MG/1
50 TABLET, FILM COATED ORAL 2 TIMES DAILY
Status: DISCONTINUED | OUTPATIENT
Start: 2018-02-07 | End: 2018-02-09 | Stop reason: HOSPADM

## 2018-02-07 RX ORDER — LABETALOL HYDROCHLORIDE 5 MG/ML
5-10 INJECTION, SOLUTION INTRAVENOUS PRN
Status: DISCONTINUED | OUTPATIENT
Start: 2018-02-07 | End: 2018-02-09 | Stop reason: HOSPADM

## 2018-02-07 RX ORDER — SODIUM CHLORIDE, SODIUM LACTATE, POTASSIUM CHLORIDE, CALCIUM CHLORIDE 600; 310; 30; 20 MG/100ML; MG/100ML; MG/100ML; MG/100ML
INJECTION, SOLUTION INTRAVENOUS CONTINUOUS
Status: DISCONTINUED | OUTPATIENT
Start: 2018-02-07 | End: 2018-02-09 | Stop reason: HOSPADM

## 2018-02-07 RX ORDER — MORPHINE SULFATE 4 MG/ML
2-4 INJECTION, SOLUTION INTRAMUSCULAR; INTRAVENOUS
Status: DISCONTINUED | OUTPATIENT
Start: 2018-02-07 | End: 2018-02-09 | Stop reason: HOSPADM

## 2018-02-07 RX ORDER — HYDROCODONE BITARTRATE AND ACETAMINOPHEN 5; 325 MG/1; MG/1
1 TABLET ORAL EVERY 4 HOURS PRN
Qty: 20 TAB | Refills: 0 | Status: SHIPPED | OUTPATIENT
Start: 2018-02-07 | End: 2018-02-10

## 2018-02-07 RX ORDER — MORPHINE SULFATE 10 MG/ML
5 INJECTION, SOLUTION INTRAMUSCULAR; INTRAVENOUS
Status: DISCONTINUED | OUTPATIENT
Start: 2018-02-07 | End: 2018-02-09 | Stop reason: HOSPADM

## 2018-02-07 RX ORDER — EZETIMIBE 10 MG/1
10 TABLET ORAL EVERY EVENING
Status: DISCONTINUED | OUTPATIENT
Start: 2018-02-07 | End: 2018-02-09 | Stop reason: HOSPADM

## 2018-02-07 RX ORDER — LIDOCAINE AND PRILOCAINE 25; 25 MG/G; MG/G
1 CREAM TOPICAL
Status: ACTIVE | OUTPATIENT
Start: 2018-02-07 | End: 2018-02-08

## 2018-02-07 RX ORDER — FAMOTIDINE 20 MG/1
20 TABLET, FILM COATED ORAL 2 TIMES DAILY
Status: DISCONTINUED | OUTPATIENT
Start: 2018-02-07 | End: 2018-02-09 | Stop reason: HOSPADM

## 2018-02-07 RX ORDER — ATORVASTATIN CALCIUM 40 MG/1
80 TABLET, FILM COATED ORAL EVERY EVENING
Status: DISCONTINUED | OUTPATIENT
Start: 2018-02-07 | End: 2018-02-09 | Stop reason: HOSPADM

## 2018-02-07 RX ORDER — MIDAZOLAM HYDROCHLORIDE 1 MG/ML
INJECTION INTRAMUSCULAR; INTRAVENOUS
Status: DISPENSED
Start: 2018-02-07 | End: 2018-02-08

## 2018-02-07 RX ORDER — LIDOCAINE HYDROCHLORIDE 10 MG/ML
INJECTION, SOLUTION INFILTRATION; PERINEURAL
Status: COMPLETED
Start: 2018-02-07 | End: 2018-02-07

## 2018-02-07 RX ORDER — ONDANSETRON 2 MG/ML
4 INJECTION INTRAMUSCULAR; INTRAVENOUS EVERY 4 HOURS PRN
Status: DISCONTINUED | OUTPATIENT
Start: 2018-02-07 | End: 2018-02-09 | Stop reason: HOSPADM

## 2018-02-07 RX ADMIN — POTASSIUM CHLORIDE, DEXTROSE MONOHYDRATE AND SODIUM CHLORIDE: 150; 5; 450 INJECTION, SOLUTION INTRAVENOUS at 18:51

## 2018-02-07 RX ADMIN — FENTANYL CITRATE 25 MCG: 50 INJECTION, SOLUTION INTRAMUSCULAR; INTRAVENOUS at 15:35

## 2018-02-07 RX ADMIN — LIDOCAINE HYDROCHLORIDE 0.5 ML: 10 INJECTION, SOLUTION INFILTRATION; PERINEURAL at 10:58

## 2018-02-07 RX ADMIN — FENTANYL CITRATE 25 MCG: 50 INJECTION, SOLUTION INTRAMUSCULAR; INTRAVENOUS at 15:40

## 2018-02-07 RX ADMIN — HYDROCODONE BITARTRATE AND ACETAMINOPHEN 15 ML: 2.5; 108 SOLUTION ORAL at 15:45

## 2018-02-07 RX ADMIN — AMITRIPTYLINE HYDROCHLORIDE 300 MG: 100 TABLET, FILM COATED ORAL at 20:36

## 2018-02-07 RX ADMIN — ACETAMINOPHEN 1000 MG: 500 TABLET ORAL at 18:50

## 2018-02-07 RX ADMIN — FENTANYL CITRATE 25 MCG: 50 INJECTION, SOLUTION INTRAMUSCULAR; INTRAVENOUS at 16:14

## 2018-02-07 RX ADMIN — SODIUM CHLORIDE, SODIUM LACTATE, POTASSIUM CHLORIDE, CALCIUM CHLORIDE: 600; 310; 30; 20 INJECTION, SOLUTION INTRAVENOUS at 10:58

## 2018-02-07 RX ADMIN — ASPIRIN 81 MG: 81 TABLET, COATED ORAL at 18:50

## 2018-02-07 RX ADMIN — METOPROLOL TARTRATE 50 MG: 50 TABLET, FILM COATED ORAL at 20:38

## 2018-02-07 RX ADMIN — CEFAZOLIN SODIUM 1 G: 1 INJECTION, SOLUTION INTRAVENOUS at 22:25

## 2018-02-07 RX ADMIN — ATORVASTATIN CALCIUM 80 MG: 40 TABLET, FILM COATED ORAL at 20:37

## 2018-02-07 RX ADMIN — EZETIMIBE 10 MG: 10 TABLET ORAL at 20:37

## 2018-02-07 RX ADMIN — FENTANYL CITRATE 25 MCG: 50 INJECTION, SOLUTION INTRAMUSCULAR; INTRAVENOUS at 16:25

## 2018-02-07 RX ADMIN — GABAPENTIN 600 MG: 300 CAPSULE ORAL at 20:36

## 2018-02-07 RX ADMIN — ACETAMINOPHEN 1000 MG: 500 TABLET ORAL at 23:06

## 2018-02-07 RX ADMIN — FAMOTIDINE 20 MG: 20 TABLET, FILM COATED ORAL at 20:37

## 2018-02-07 ASSESSMENT — PATIENT HEALTH QUESTIONNAIRE - PHQ9
2. FEELING DOWN, DEPRESSED, IRRITABLE, OR HOPELESS: NOT AT ALL
SUM OF ALL RESPONSES TO PHQ9 QUESTIONS 1 AND 2: 0
SUM OF ALL RESPONSES TO PHQ QUESTIONS 1-9: 0
1. LITTLE INTEREST OR PLEASURE IN DOING THINGS: NOT AT ALL

## 2018-02-07 ASSESSMENT — PAIN SCALES - GENERAL
PAINLEVEL_OUTOF10: 0
PAINLEVEL_OUTOF10: 1
PAINLEVEL_OUTOF10: 2

## 2018-02-07 ASSESSMENT — PAIN SCALES - WONG BAKER
WONGBAKER_NUMERICALRESPONSE: DOESN'T HURT AT ALL
WONGBAKER_NUMERICALRESPONSE: HURTS JUST A LITTLE BIT

## 2018-02-07 ASSESSMENT — LIFESTYLE VARIABLES
ALCOHOL_USE: NO
EVER_SMOKED: YES

## 2018-02-07 NOTE — OP REPORT
02/07/18    OPERATIVE NOTE    PRE-OPERATIVE DIAGNOSIS -peripheral arterial disease with short distance claudication right lower extremity    POST-OPERATIVE DIAGNOSIS -same    PROCEDURE PERFORMED -right femoral to above-knee popliteal artery bypass using 6 mm PTFE graft    SURGEON - Lance Austin M.D.    ASSISTANT - ARTHUR Lloyd    TYPE OF ANESTHESIA - General     ANESTHESIOLOGIST  - Dr. Eckert      SPECIMENS -none    INDICATIONS - 77 y.o.      PROCEDURE IN DETAIL -patient was taken to the operative suite placed in the supine position after adequate anesthesia the patient's right leg was circumferentially prepped and draped in sterile fashion. An incision was made in oblique fashion overlying the right inguinal ligament incision was carried down through the subcutaneous tissue in the right common femoral artery was circumferentially dissected and isolated with vessel loops. A 2nd incision was made just above the knee in the medial aspect of the leg incision was carried down through the subcutaneous tissue and the above-knee popliteal artery was circumferentially dissected and isolated with vessel loops as well. A Mansi-Wick tunneler was then used to tunnel a 6 mm ringed PTFE graft from one incision to the next. 6000 units of heparin was administered after appropriate period of time the above-knee popliteal artery was clamped proximally and distally delicately. A longitudinal arteriotomy was made in the above-knee popliteal artery the graft was spatulated and an end-to-side anastomosis was completed between the PTFE graft and the above-knee popliteal artery using a 6-0 Prolene suture. After fore flushing and backflushing the anastomosis was complete. Next my attention turned toward the femoral anastomosis the vessel was clamped proximally and distally. A longitudinal arteriotomy was made in the right common femoral artery the graft was properly spatulated and an end-to-side anastomosis was completed between the  common femoral artery and the PTFE graft using a 5-0 Prolene suture. After fore flushing and backflushing flow was established to the bypass graft with good perfusion. Hemostasis was assured 10 flat fluted Robert-Jon drain was left within the lower incision brought out through a separate stab incision and secured to the skin. 2-0 Vicryl interrupted sutures were placed followed by skin staples in the lower incision. A 2-0 Vicryl runners as well as 2-0 Vicryl interrupted sutures were used in the right groin followed by a 4-0 Monocryl. A Pravena incision bandage was applied. Patient's right leg was wrapped using an Ace bandage and the patient was taken to the recovery room in stable condition.      Lance Austin M.D.

## 2018-02-08 PROCEDURE — 700111 HCHG RX REV CODE 636 W/ 250 OVERRIDE (IP): Performed by: NURSE PRACTITIONER

## 2018-02-08 PROCEDURE — 700102 HCHG RX REV CODE 250 W/ 637 OVERRIDE(OP): Performed by: NURSE PRACTITIONER

## 2018-02-08 PROCEDURE — 700101 HCHG RX REV CODE 250: Performed by: NURSE PRACTITIONER

## 2018-02-08 PROCEDURE — A9270 NON-COVERED ITEM OR SERVICE: HCPCS | Performed by: NURSE PRACTITIONER

## 2018-02-08 PROCEDURE — 770006 HCHG ROOM/CARE - MED/SURG/GYN SEMI*

## 2018-02-08 RX ADMIN — POTASSIUM CHLORIDE, DEXTROSE MONOHYDRATE AND SODIUM CHLORIDE: 150; 5; 450 INJECTION, SOLUTION INTRAVENOUS at 18:14

## 2018-02-08 RX ADMIN — ACETAMINOPHEN 1000 MG: 500 TABLET ORAL at 05:45

## 2018-02-08 RX ADMIN — ASPIRIN 81 MG: 81 TABLET, COATED ORAL at 09:27

## 2018-02-08 RX ADMIN — METOPROLOL TARTRATE 50 MG: 50 TABLET, FILM COATED ORAL at 21:01

## 2018-02-08 RX ADMIN — CEFAZOLIN SODIUM 1 G: 1 INJECTION, SOLUTION INTRAVENOUS at 15:19

## 2018-02-08 RX ADMIN — CEFAZOLIN SODIUM 1 G: 1 INJECTION, SOLUTION INTRAVENOUS at 05:45

## 2018-02-08 RX ADMIN — EZETIMIBE 10 MG: 10 TABLET ORAL at 21:00

## 2018-02-08 RX ADMIN — OMEPRAZOLE 20 MG: 20 CAPSULE, DELAYED RELEASE ORAL at 09:27

## 2018-02-08 RX ADMIN — ATORVASTATIN CALCIUM 80 MG: 40 TABLET, FILM COATED ORAL at 21:00

## 2018-02-08 RX ADMIN — POTASSIUM CHLORIDE, DEXTROSE MONOHYDRATE AND SODIUM CHLORIDE: 150; 5; 450 INJECTION, SOLUTION INTRAVENOUS at 05:46

## 2018-02-08 RX ADMIN — GABAPENTIN 600 MG: 300 CAPSULE ORAL at 21:01

## 2018-02-08 RX ADMIN — METOPROLOL TARTRATE 50 MG: 50 TABLET, FILM COATED ORAL at 09:27

## 2018-02-08 RX ADMIN — OXYCODONE HYDROCHLORIDE 5 MG: 5 TABLET ORAL at 05:45

## 2018-02-08 RX ADMIN — AMITRIPTYLINE HYDROCHLORIDE 300 MG: 100 TABLET, FILM COATED ORAL at 21:00

## 2018-02-08 RX ADMIN — FAMOTIDINE 20 MG: 20 TABLET, FILM COATED ORAL at 21:00

## 2018-02-08 RX ADMIN — FAMOTIDINE 20 MG: 20 TABLET, FILM COATED ORAL at 09:27

## 2018-02-08 RX ADMIN — OXYCODONE HYDROCHLORIDE 5 MG: 5 TABLET ORAL at 01:18

## 2018-02-08 ASSESSMENT — PAIN SCALES - GENERAL
PAINLEVEL_OUTOF10: 1
PAINLEVEL_OUTOF10: 1
PAINLEVEL_OUTOF10: 4
PAINLEVEL_OUTOF10: 4
PAINLEVEL_OUTOF10: 1
PAINLEVEL_OUTOF10: 1
PAINLEVEL_OUTOF10: 2

## 2018-02-08 ASSESSMENT — COPD QUESTIONNAIRES
DO YOU EVER COUGH UP ANY MUCUS OR PHLEGM?: NO/ONLY WITH OCCASIONAL COLDS OR INFECTIONS
DURING THE PAST 4 WEEKS HOW MUCH DID YOU FEEL SHORT OF BREATH: NONE/LITTLE OF THE TIME
HAVE YOU SMOKED AT LEAST 100 CIGARETTES IN YOUR ENTIRE LIFE: YES
COPD SCREENING SCORE: 4

## 2018-02-08 ASSESSMENT — LIFESTYLE VARIABLES: EVER_SMOKED: YES

## 2018-02-08 NOTE — RESPIRATORY CARE
COPD EDUCATION by COPD CLINICAL EDUCATOR  2/8/2018 at 7:02 AM by Patience Murry     Patient reviewed by COPD education team. Patient does not qualify for COPD program.

## 2018-02-08 NOTE — PROGRESS NOTES
Bedside report received.  Assessment complete.  A&O x 4. Patient calls appropriately.  Denies numbness and tingling.   Patient to chair with 1-2 person assist. Bed alarm in use.   Patient has 1/10 pain. Declines pain medication at this time.  Denies N&V. Tolerating regular diet.  Surgical incision right leg; prevena right groin; RAMÓN right leg.  + void, + flatus  Patient denies SOB.  Review plan with of care with patient. Call light and personal belongings with in reach. Hourly rounding in place. All needs met at this time.

## 2018-02-08 NOTE — CARE PLAN
Problem: Communication  Goal: The ability to communicate needs accurately and effectively will improve  Outcome: PROGRESSING AS EXPECTED  Communicates needs effectively.  Calls appropriately.      Problem: Bowel/Gastric:  Goal: Normal bowel function is maintained or improved  Outcome: PROGRESSING AS EXPECTED  Patient reports passing flatus  Bowel sounds present.   Patient tolerating regualr diet.     Problem: Knowledge Deficit  Goal: Knowledge of disease process/condition, treatment plan, diagnostic tests, and medications will improve  Outcome: PROGRESSING AS EXPECTED  Patient reports understanding of plan of care.

## 2018-02-08 NOTE — PROGRESS NOTES
Bedside report received.  Assessment complete.  A&O x 4. Patient calls appropriately.   Patient to chair with 1-2 person assist. Bed alarm in use.   Patient has 1/10 pain. Declines pain medication at this time.  Denies N&V. Tolerating clears.  Surgical incision right leg; prevena right groin.  - void, - flatus  Patient denies SOB.  Patient has left BKA.  Review plan with of care with patient. Call light and personal belongings with in reach. Hourly rounding in place. All needs met at this time.    2 RN skin check performed.    Generalized fragile, flaky skin.  Redness, blanching behind both ears under eye glasses.  Discolored skin right ear.  Bruising and discolored skin bilateral upper extremities.  Scab left hand.  Areas of discolored skin right hip and side.   Below the knee amputation left leg.   Callus with dry skin left stump. Scars left stump.  Dry skin with callus right heel.   Sacrum not red, blanching skin.

## 2018-02-08 NOTE — PROGRESS NOTES
Vascular Surgery     Doing well   Foot well perfused   Graft patent   RAMÓN minimal output    Plan - Ambulate   D/C RAMÓN drain tomorrow   Hopefully home soon     Lance Austin MD

## 2018-02-08 NOTE — CARE PLAN
Problem: Pain Management  Goal: Pain level will decrease to patient's comfort goal    Intervention: Follow pain managment plan developed in collaboration with patient and Interdisciplinary Team  PO pain medications in use to help pt manage pain. Pt able to reposition self as needed to increase comfort.       Problem: Safety  Goal: Will remain free from falls  Outcome: PROGRESSING AS EXPECTED  Treaded slipper sock in use, bed alarm is on, bed is locked and in lowest position, personal belongings are within reach, call light is within reach, and hourly rounding is in place.

## 2018-02-08 NOTE — PROGRESS NOTES
Bedside report received.  Assessment complete.  A&O x 4. Patient calls appropriately.  Denies numbness and tingling.   Patient up with two assist. Bed alarm in place.   Patient has 2/10 pain. Declines intervention  Denies N&V. Tolerating regular diet.  Surgical site to right leg noted, dressing in place, prevena wound vac, and nadira davies drain noted.  positive void, denies flatus  Patient denies SOB.  Review plan with of care with patient. Call light and personal belongings with in reach. Hourly rounding in place. All needs met at this time.

## 2018-02-09 VITALS
HEART RATE: 80 BPM | BODY MASS INDEX: 28.85 KG/M2 | HEIGHT: 72 IN | OXYGEN SATURATION: 92 % | SYSTOLIC BLOOD PRESSURE: 128 MMHG | RESPIRATION RATE: 14 BRPM | DIASTOLIC BLOOD PRESSURE: 58 MMHG | TEMPERATURE: 98.8 F | WEIGHT: 212.96 LBS

## 2018-02-09 PROCEDURE — 700102 HCHG RX REV CODE 250 W/ 637 OVERRIDE(OP): Performed by: NURSE PRACTITIONER

## 2018-02-09 PROCEDURE — A9270 NON-COVERED ITEM OR SERVICE: HCPCS | Performed by: NURSE PRACTITIONER

## 2018-02-09 PROCEDURE — 700101 HCHG RX REV CODE 250: Performed by: NURSE PRACTITIONER

## 2018-02-09 RX ADMIN — ASPIRIN 81 MG: 81 TABLET, COATED ORAL at 08:47

## 2018-02-09 RX ADMIN — OMEPRAZOLE 20 MG: 20 CAPSULE, DELAYED RELEASE ORAL at 08:47

## 2018-02-09 RX ADMIN — FAMOTIDINE 20 MG: 20 TABLET, FILM COATED ORAL at 08:47

## 2018-02-09 RX ADMIN — POTASSIUM CHLORIDE, DEXTROSE MONOHYDRATE AND SODIUM CHLORIDE: 150; 5; 450 INJECTION, SOLUTION INTRAVENOUS at 05:37

## 2018-02-09 RX ADMIN — METOPROLOL TARTRATE 50 MG: 50 TABLET, FILM COATED ORAL at 08:47

## 2018-02-09 ASSESSMENT — PAIN SCALES - GENERAL
PAINLEVEL_OUTOF10: 1
PAINLEVEL_OUTOF10: 1

## 2018-02-09 NOTE — CARE PLAN
Problem: Safety  Goal: Will remain free from falls  Outcome: PROGRESSING AS EXPECTED  Bed alarm on. Treaded slipper sock in use, personal belongings are within reach, call light is within reach, bed is locked and in lowest position, and hourly rounding is in place. Pt calls appropriately and waits for staff to arrive prior to mobilizing.     Problem: Mobility  Goal: Risk for activity intolerance will decrease  Outcome: PROGRESSING AS EXPECTED  Pt is up standby assist with the prosthetic. Pt calls appropriately and waits for staff prior to mobilizing. Pt is able to turn self in bed as needed.

## 2018-02-09 NOTE — PROGRESS NOTES
Bedside report received.  Assessment complete.  A&O x 4. Patient calls appropriately.  Denies numbness and tingling.   Patient to chair with 1-2 person assist.   Patient denies pain at this time.   Denies N&V. Tolerating regualr diet.  Surgical incision right groin and inner thigh; RAMÓN right leg; prevena right groin.  + void, + flatus  Patient denies SOB.  Review plan with of care with patient. Call light and personal belongings with in reach. Hourly rounding in place. All needs met at this time.

## 2018-02-09 NOTE — DISCHARGE SUMMARY
CHIEF COMPLAINT ON ADMISSION  No chief complaint on file.      CODE STATUS  Full Code    HPI & HOSPITAL COURSE  This is a 77 y.o. male here with peripheral artery disease.     The patient met 2-midnight criteria for an inpatient stay at the time of discharge.    Therefore, he is discharged in good and stable condition with close outpatient follow-up.    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 *      FOLLOW UP  No future appointments.  Lance Austin M.D.  [address] [facility] [address]  [address]  [place] [address]  [phone]    In 1 week        MEDICATIONS ON DISCHARGE   Galileo Torrez   Home Medication Instructions [place]:[id number]    Printed on:02/09/18 9348   Medication Information                      amitriptyline (ELAVIL) 150 MG Tab  Take 2 Tabs by mouth every bedtime.             aspirin EC (ECOTRIN) 81 MG Tablet Delayed Response  Take 81 mg by mouth every morning. Indications: Heart Attack             atorvastatin (LIPITOR) 80 MG tablet  Take 1 Tab by mouth every evening.             clopidogrel (PLAVIX) 75 MG Tab  Take 75 mg by mouth every evening.             ezetimibe (ZETIA) 10 MG Tab  Take 10 mg by mouth every evening.             gabapentin (NEURONTIN) 300 MG Cap  Take 2 Caps by mouth every bedtime.             HYDROcodone-acetaminophen (NORCO) 5-325 MG Tab per tablet  Take 1 Tab by mouth every four hours as needed for up to 4 days.             metoprolol (LOPRESSOR) 25 MG Tab  Take 2 Tabs by mouth 2 times a day.             omeprazole (PRILOSEC) 20 MG delayed-release capsule  Take 1 Cap by mouth every day.             ranitidine (ZANTAC) 150 MG Tab  Take 1 Tab by mouth 2 times a day.                 DIET  Orders Placed This Encounter   Procedures   • DIET ORDER     Standing Status:   Standing     Number of Occurrences:   1      Order Specific Question:   Diet:     Answer:   Regular [1]       ACTIVITY  As tolerated.  Weight bearing as tolerated      CONSULTATIONS  None      PROCEDURES  Right femoral to popliteal artery bypass graft    LABORATORY  Lab Results   Component Value Date/Time    SODIUM 138 01/11/2018 08:57 AM    POTASSIUM 5.2 01/11/2018 08:57 AM    CHLORIDE 105 01/11/2018 08:57 AM    CO2 25 01/11/2018 08:57 AM    GLUCOSE 102 (H) 01/11/2018 08:57 AM    BUN 10 01/11/2018 08:57 AM    CREATININE 1.28 01/11/2018 08:57 AM        Lab Results   Component Value Date/Time    WBC 9.9 01/11/2018 08:57 AM    HEMOGLOBIN 14.3 01/11/2018 08:57 AM    HEMATOCRIT 42.6 01/11/2018 08:57 AM    PLATELETCT 290 01/11/2018 08:57 AM        Total time of the discharge process exceeds 31 minutes

## 2018-02-09 NOTE — DISCHARGE INSTRUCTIONS
Discharge Instructions    Discharged to home by car with relative. Discharged via wheelchair, hospital escort: Yes.  Special equipment needed: Not Applicable    Be sure to schedule a follow-up appointment with your primary care doctor or any specialists as instructed.     Discharge Plan:   Smoking Cessation Offered: Patient Refused  Influenza Vaccine Indication: Not indicated: Previously immunized this influenza season and > 8 years of age    I understand that a diet low in cholesterol, fat, and sodium is recommended for good health. Unless I have been given specific instructions below for another diet, I accept this instruction as my diet prescription.   Other diet: Regular, as tolerated    Special Instructions:    Femoral Popliteal Bypass, Care After  Refer to this sheet in the next few weeks. These instructions provide you with information on caring for yourself after your procedure. Your health care provider may also give you more specific instructions. Your treatment has been planned according to current medical practices, but problems sometimes occur. Call your health care provider if you have any problems or questions after your procedure.  HOME CARE INSTRUCTIONS   · Take medicines as told by your health care provider.  · Clean your incision site as told by your health care provider.  · Keep the area around your incision dry unless told by your health care provider. Ask your health care provider when it is okay to shower. Do not take a bath, swim, or soak in a hot tub until your incision has completely healed.  · Keep the area around the incision clean. This will help decrease your risk of infection. Check your incision site every day. Let your health care provider know if you see any swelling, redness, or anything leaking from the incision.  · Exercise as told by your health care provider.  · Avoid strenuous physical activity for the first few weeks. This is anything that requires great effort or  energy.  · Raise (elevate) your legs when sitting.  · Ask your health care provider when you can return to work. The type of work you do will make a difference.  · Do not drive while taking pain medicines. Ask your health care provider when it is safe to start driving.  · Take all medicines as directed by your health care provider.  · Keep all your follow-up appointments with your health care provider.  · For long-term success:  ¨ Control your blood pressure.  ¨ Take prescription medicines as told by your health care provider.  ¨ Manage diabetes, if you have it.  ¨ Do not smoke.  ¨ Eat healthy foods. Ask your health care provider for suggestions, or talk with a dietitian.  ¨ Get regular exercise. Talk with your health care provider before starting any new physical activity.  SEEK MEDICAL CARE IF:   · You have redness and swelling around your incision site.  · You continue to have pain in your leg, even after taking pain medicine.  · You have any questions about your medicines.  · You have nausea or vomiting.  · You have abnormal bowel habits such as having a difficult time having a bowel movement (constipation) or having loose stools (diarrhea).  · You feel weak and tired.  · You are too tired to walk every day.  SEEK IMMEDIATE MEDICAL CARE IF:   · Your leg becomes pale, cold, blue, tingly, or has no feeling.  · You have yellow or tan fluid coming from your incision site.  · You have bleeding from the incision site.  · You have severe pain in your leg that does not go away, even after you have taken pain medicine.  · You have trouble breathing.  · You have chest pain.  · You have a fever.  MAKE SURE YOU:  · Understand these instructions.  · Will watch your condition.  · Will get help right away if you are not doing well or get worse.     This information is not intended to replace advice given to you by your health care provider. Make sure you discuss any questions you have with your health care provider.      Document Released: 10/15/2010 Document Revised: 12/23/2014 Document Reviewed: 10/15/2010  [employer] Interactive Patient Education ©2016 [employer]          · Is patient discharged on Warfarin / Coumadin?   No     Depression / Suicide Risk    As you are discharged from this [facility] facility, it is important to learn how to keep safe from harming yourself.    Recognize the warning signs:  · Abrupt changes in personality, positive or negative- including increase in energy   · Giving away possessions  · Change in eating patterns- significant weight changes-  positive or negative  · Change in sleeping patterns- unable to sleep or sleeping all the time   · Unwillingness or inability to communicate  · Depression  · Unusual sadness, discouragement and loneliness  · Talk of wanting to die  · Neglect of personal appearance   · Rebelliousness- reckless behavior  · Withdrawal from people/activities they love  · Confusion- inability to concentrate     If you or a loved one observes any of these behaviors or has concerns about self-harm, here's what you can do:  · Talk about it- your feelings and reasons for harming yourself  · Remove any means that you might use to hurt yourself (examples: pills, rope, extension cords, firearm)  · Get professional help from the community (Mental Health, Substance Abuse, psychological counseling)  · Do not be alone:Call your Safe Contact- someone whom you trust who will be there for you.  · Call your local CRISIS HOTLINE 146-6890 or 801-781-4745  · Call your local Children's Mobile Crisis Response Team [place] (115) 125-4864 or www.HomeJab  · Call the toll free National Suicide Prevention Hotlines   · National Suicide Prevention Lifeline 781-352-XFFJ (2913)  · National Hope Line Network 800-SUICIDE (567-8721)      Femoral Popliteal Bypass, Care After  Refer to this sheet in the next few weeks. These instructions provide you with information on caring for yourself after  your procedure. Your health care provider may also give you more specific instructions. Your treatment has been planned according to current medical practices, but problems sometimes occur. Call your health care provider if you have any problems or questions after your procedure.  HOME CARE INSTRUCTIONS   · Take medicines as told by your health care provider.  · Clean your incision site as told by your health care provider.  · Keep the area around your incision dry unless told by your health care provider. Ask your health care provider when it is okay to shower. Do not take a bath, swim, or soak in a hot tub until your incision has completely healed.  · Keep the area around the incision clean. This will help decrease your risk of infection. Check your incision site every day. Let your health care provider know if you see any swelling, redness, or anything leaking from the incision.  · Exercise as told by your health care provider.  · Avoid strenuous physical activity for the first few weeks. This is anything that requires great effort or energy.  · Raise (elevate) your legs when sitting.  · Ask your health care provider when you can return to work. The type of work you do will make a difference.  · Do not drive while taking pain medicines. Ask your health care provider when it is safe to start driving.  · Take all medicines as directed by your health care provider.  · Keep all your follow-up appointments with your health care provider.  · For long-term success:  ¨ Control your blood pressure.  ¨ Take prescription medicines as told by your health care provider.  ¨ Manage diabetes, if you have it.  ¨ Do not smoke.  ¨ Eat healthy foods. Ask your health care provider for suggestions, or talk with a dietitian.  ¨ Get regular exercise. Talk with your health care provider before starting any new physical activity.  SEEK MEDICAL CARE IF:   · You have redness and swelling around your incision site.  · You continue to have  pain in your leg, even after taking pain medicine.  · You have any questions about your medicines.  · You have nausea or vomiting.  · You have abnormal bowel habits such as having a difficult time having a bowel movement (constipation) or having loose stools (diarrhea).  · You feel weak and tired.  · You are too tired to walk every day.  SEEK IMMEDIATE MEDICAL CARE IF:   · Your leg becomes pale, cold, blue, tingly, or has no feeling.  · You have yellow or tan fluid coming from your incision site.  · You have bleeding from the incision site.  · You have severe pain in your leg that does not go away, even after you have taken pain medicine.  · You have trouble breathing.  · You have chest pain.  · You have a fever.  MAKE SURE YOU:  · Understand these instructions.  · Will watch your condition.  · Will get help right away if you are not doing well or get worse.     This information is not intended to replace advice given to you by your health care provider. Make sure you discuss any questions you have with your health care provider.     Document Released: 10/15/2010 Document Revised: 12/23/2014 Document Reviewed: 10/15/2010  [employer] Interactive Patient Education ©2016 [employer]

## 2018-02-09 NOTE — DISCHARGE PLANNING
Care Transition Team Assessment    Information Source  Orientation : Oriented x 4  Information Given By: Patient  Informant's Name: Galileo  Who is responsible for making decisions for patient? : Patient    Readmission Evaluation  Is this a readmission?: No    Elopement Risk  Legal Hold: No  Ambulatory or Self Mobile in Wheelchair: Yes  Disoriented: No  Psychiatric Symptoms: None  History of Wandering: No  Elopement this Admit: No  Vocalizing Wanting to Leave: No  Displays Behaviors, Body Language Wanting to Leave: No-Not at Risk for Elopement  Elopement Risk: Not at Risk for Elopement    Interdisciplinary Discharge Planning  Does Admitting Nurse Feel This Could be a Complex Discharge?: Yes  Primary Care Physician:  (Patient does not know name)  Lives with - Patient's Self Care Capacity: Alone and Able to Care For Self  Patient or legal guardian wants to designate a caregiver (see row info): No  Support Systems: Family Member(s)  Housing / Facility: Mobile Home  Do You Take your Prescribed Medications Regularly: Yes  Able to Return to Previous ADL's: Future Time w/Therapy  Mobility Issues: Yes  Prior Services: None  Patient Expects to be Discharged to:: home  Assistance Needed: Unknown at this Time  Durable Medical Equipment: Unknown    Discharge Preparedness  What is your plan after discharge?:  (HOME)  What are your discharge supports?: Spouse  Prior Functional Level: Independent with Activities of Daily Living  Difficulity with ADLs: None    Functional Assesment  Prior Functional Level: Independent with Activities of Daily Living    Finances  Financial Barriers to Discharge: No  Prescription Coverage: Yes    Vision / Hearing Impairment  Vision Impairment : Yes  Right Eye Vision: Impaired, Wears Glasses  Left Eye Vision: Impaired, Wears Glasses  Hearing Impairment : No    Values / Beliefs / Concerns  Values / Beliefs Concerns : No  Spiritual Requests During Hospitalization: No    Advance Directive  Advance  Directive?: None    Domestic Abuse  Have you ever been the victim of abuse or violence?: No  Physical Abuse or Sexual Abuse: No  Verbal Abuse or Emotional Abuse: No  Possible Abuse Reported to:: Not Applicable    Psychological Assessment  History of Substance Abuse: None  History of Psychiatric Problems: No         Anticipated Discharge Information  Anticipated discharge disposition: Home

## 2018-02-09 NOTE — PROGRESS NOTES
Bedside report received.  Assessment complete.  A&O x 4. Patient calls appropriately.  Denies numbness and tingling.   Patient up with standby assist. Bed alarm in place.   Patient has 2/10 pain. Declines intervention  Denies N&V. Tolerating regular diet.  Surgical site to right leg noted, dressing in place, prevena wound vac, and nadira davies drain noted.  positive void, denies flatus  Patient denies SOB.  Review plan with of care with patient. Call light and personal belongings with in reach. Hourly rounding in place. All needs met at this time.

## 2018-02-09 NOTE — PROGRESS NOTES
Patient discharged home.   RAMÓN removed.  IV removed.  Dressing changed.     Patient left with prevena wound vac.   Patient had all belongings.  Patient had prescriptions.  Consent for opiates signed.   Patient verbalized understanding of discharge instructions.

## 2018-02-10 ENCOUNTER — HOSPITAL ENCOUNTER (INPATIENT)
Facility: MEDICAL CENTER | Age: 78
LOS: 2 days | DRG: 315 | End: 2018-02-12
Attending: EMERGENCY MEDICINE | Admitting: SURGERY
Payer: MEDICARE

## 2018-02-10 PROBLEM — R60.9 EDEMA: Status: ACTIVE | Noted: 2018-02-10

## 2018-02-10 LAB
ANION GAP SERPL CALC-SCNC: 9 MMOL/L (ref 0–11.9)
BASOPHILS # BLD AUTO: 0.8 % (ref 0–1.8)
BASOPHILS # BLD: 0.11 K/UL (ref 0–0.12)
BNP SERPL-MCNC: 77 PG/ML (ref 0–100)
BUN SERPL-MCNC: 15 MG/DL (ref 8–22)
CALCIUM SERPL-MCNC: 8.6 MG/DL (ref 8.5–10.5)
CHLORIDE SERPL-SCNC: 104 MMOL/L (ref 96–112)
CO2 SERPL-SCNC: 24 MMOL/L (ref 20–33)
CREAT SERPL-MCNC: 1.16 MG/DL (ref 0.5–1.4)
EOSINOPHIL # BLD AUTO: 0.53 K/UL (ref 0–0.51)
EOSINOPHIL NFR BLD: 4 % (ref 0–6.9)
ERYTHROCYTE [DISTWIDTH] IN BLOOD BY AUTOMATED COUNT: 48.5 FL (ref 35.9–50)
GLUCOSE SERPL-MCNC: 149 MG/DL (ref 65–99)
HCT VFR BLD AUTO: 41.8 % (ref 42–52)
HGB BLD-MCNC: 13.4 G/DL (ref 14–18)
IMM GRANULOCYTES # BLD AUTO: 0.06 K/UL (ref 0–0.11)
IMM GRANULOCYTES NFR BLD AUTO: 0.5 % (ref 0–0.9)
LYMPHOCYTES # BLD AUTO: 1.83 K/UL (ref 1–4.8)
LYMPHOCYTES NFR BLD: 13.9 % (ref 22–41)
MCH RBC QN AUTO: 28.3 PG (ref 27–33)
MCHC RBC AUTO-ENTMCNC: 32.1 G/DL (ref 33.7–35.3)
MCV RBC AUTO: 88.2 FL (ref 81.4–97.8)
MONOCYTES # BLD AUTO: 0.92 K/UL (ref 0–0.85)
MONOCYTES NFR BLD AUTO: 7 % (ref 0–13.4)
NEUTROPHILS # BLD AUTO: 9.75 K/UL (ref 1.82–7.42)
NEUTROPHILS NFR BLD: 73.8 % (ref 44–72)
NRBC # BLD AUTO: 0 K/UL
NRBC BLD-RTO: 0 /100 WBC
PLATELET # BLD AUTO: 303 K/UL (ref 164–446)
PMV BLD AUTO: 8.8 FL (ref 9–12.9)
POTASSIUM SERPL-SCNC: 4.3 MMOL/L (ref 3.6–5.5)
RBC # BLD AUTO: 4.74 M/UL (ref 4.7–6.1)
SODIUM SERPL-SCNC: 137 MMOL/L (ref 135–145)
WBC # BLD AUTO: 13.2 K/UL (ref 4.8–10.8)

## 2018-02-10 PROCEDURE — 83880 ASSAY OF NATRIURETIC PEPTIDE: CPT

## 2018-02-10 PROCEDURE — 80048 BASIC METABOLIC PNL TOTAL CA: CPT

## 2018-02-10 PROCEDURE — 700102 HCHG RX REV CODE 250 W/ 637 OVERRIDE(OP): Performed by: SURGERY

## 2018-02-10 PROCEDURE — 700111 HCHG RX REV CODE 636 W/ 250 OVERRIDE (IP): Performed by: SURGERY

## 2018-02-10 PROCEDURE — 36415 COLL VENOUS BLD VENIPUNCTURE: CPT

## 2018-02-10 PROCEDURE — 700105 HCHG RX REV CODE 258: Performed by: EMERGENCY MEDICINE

## 2018-02-10 PROCEDURE — 85025 COMPLETE CBC W/AUTO DIFF WBC: CPT

## 2018-02-10 PROCEDURE — 770006 HCHG ROOM/CARE - MED/SURG/GYN SEMI*

## 2018-02-10 PROCEDURE — 700105 HCHG RX REV CODE 258: Performed by: SURGERY

## 2018-02-10 PROCEDURE — A9270 NON-COVERED ITEM OR SERVICE: HCPCS | Performed by: SURGERY

## 2018-02-10 PROCEDURE — 99285 EMERGENCY DEPT VISIT HI MDM: CPT

## 2018-02-10 PROCEDURE — 302875 HCHG BANDAGE ACE 4 OR 6""

## 2018-02-10 RX ORDER — SODIUM CHLORIDE 9 MG/ML
INJECTION, SOLUTION INTRAVENOUS CONTINUOUS
Status: DISCONTINUED | OUTPATIENT
Start: 2018-02-10 | End: 2018-02-12 | Stop reason: HOSPADM

## 2018-02-10 RX ORDER — FAMOTIDINE 20 MG/1
20 TABLET, FILM COATED ORAL 2 TIMES DAILY
Status: DISCONTINUED | OUTPATIENT
Start: 2018-02-10 | End: 2018-02-12 | Stop reason: HOSPADM

## 2018-02-10 RX ORDER — ATORVASTATIN CALCIUM 40 MG/1
80 TABLET, FILM COATED ORAL EVERY EVENING
Status: DISCONTINUED | OUTPATIENT
Start: 2018-02-10 | End: 2018-02-12 | Stop reason: HOSPADM

## 2018-02-10 RX ORDER — GABAPENTIN 300 MG/1
600 CAPSULE ORAL
Status: DISCONTINUED | OUTPATIENT
Start: 2018-02-10 | End: 2018-02-12 | Stop reason: HOSPADM

## 2018-02-10 RX ORDER — BISACODYL 10 MG
10 SUPPOSITORY, RECTAL RECTAL DAILY
Status: DISCONTINUED | OUTPATIENT
Start: 2018-02-10 | End: 2018-02-12 | Stop reason: HOSPADM

## 2018-02-10 RX ORDER — RANITIDINE 150 MG/1
150 TABLET ORAL 2 TIMES DAILY
Status: DISCONTINUED | OUTPATIENT
Start: 2018-02-10 | End: 2018-02-10

## 2018-02-10 RX ORDER — ONDANSETRON 2 MG/ML
4 INJECTION INTRAMUSCULAR; INTRAVENOUS
Status: DISCONTINUED | OUTPATIENT
Start: 2018-02-10 | End: 2018-02-12 | Stop reason: HOSPADM

## 2018-02-10 RX ORDER — AMITRIPTYLINE HYDROCHLORIDE 100 MG/1
300 TABLET ORAL
Status: DISCONTINUED | OUTPATIENT
Start: 2018-02-10 | End: 2018-02-12 | Stop reason: HOSPADM

## 2018-02-10 RX ORDER — CLOPIDOGREL BISULFATE 75 MG/1
75 TABLET ORAL EVERY EVENING
Status: DISCONTINUED | OUTPATIENT
Start: 2018-02-10 | End: 2018-02-12 | Stop reason: HOSPADM

## 2018-02-10 RX ORDER — OMEPRAZOLE 20 MG/1
20 CAPSULE, DELAYED RELEASE ORAL DAILY
Status: DISCONTINUED | OUTPATIENT
Start: 2018-02-10 | End: 2018-02-12 | Stop reason: HOSPADM

## 2018-02-10 RX ORDER — EZETIMIBE 10 MG/1
10 TABLET ORAL EVERY EVENING
Status: DISCONTINUED | OUTPATIENT
Start: 2018-02-10 | End: 2018-02-12 | Stop reason: HOSPADM

## 2018-02-10 RX ORDER — FUROSEMIDE 10 MG/ML
20 INJECTION INTRAMUSCULAR; INTRAVENOUS ONCE
Status: COMPLETED | OUTPATIENT
Start: 2018-02-10 | End: 2018-02-10

## 2018-02-10 RX ORDER — OXYCODONE HYDROCHLORIDE 5 MG/1
5 TABLET ORAL
Status: DISCONTINUED | OUTPATIENT
Start: 2018-02-10 | End: 2018-02-12 | Stop reason: HOSPADM

## 2018-02-10 RX ORDER — METOPROLOL TARTRATE 50 MG/1
50 TABLET, FILM COATED ORAL 2 TIMES DAILY
Status: DISCONTINUED | OUTPATIENT
Start: 2018-02-10 | End: 2018-02-12 | Stop reason: HOSPADM

## 2018-02-10 RX ORDER — ONDANSETRON 2 MG/ML
4 INJECTION INTRAMUSCULAR; INTRAVENOUS EVERY 4 HOURS PRN
Status: DISCONTINUED | OUTPATIENT
Start: 2018-02-10 | End: 2018-02-12 | Stop reason: HOSPADM

## 2018-02-10 RX ADMIN — SODIUM CHLORIDE: 9 INJECTION, SOLUTION INTRAVENOUS at 13:42

## 2018-02-10 RX ADMIN — GABAPENTIN 600 MG: 300 CAPSULE ORAL at 19:40

## 2018-02-10 RX ADMIN — METOPROLOL TARTRATE 50 MG: 50 TABLET, FILM COATED ORAL at 19:45

## 2018-02-10 RX ADMIN — OMEPRAZOLE 20 MG: 20 CAPSULE, DELAYED RELEASE ORAL at 10:52

## 2018-02-10 RX ADMIN — ATORVASTATIN CALCIUM 80 MG: 40 TABLET, FILM COATED ORAL at 19:45

## 2018-02-10 RX ADMIN — FUROSEMIDE 20 MG: 10 INJECTION, SOLUTION INTRAMUSCULAR; INTRAVENOUS at 10:56

## 2018-02-10 RX ADMIN — EZETIMIBE 10 MG: 10 TABLET ORAL at 19:45

## 2018-02-10 RX ADMIN — TAZOBACTAM SODIUM AND PIPERACILLIN SODIUM 3.38 G: 375; 3 INJECTION, SOLUTION INTRAVENOUS at 10:56

## 2018-02-10 RX ADMIN — AMITRIPTYLINE HYDROCHLORIDE 300 MG: 100 TABLET, FILM COATED ORAL at 19:44

## 2018-02-10 RX ADMIN — SODIUM CHLORIDE: 9 INJECTION, SOLUTION INTRAVENOUS at 10:56

## 2018-02-10 RX ADMIN — CLOPIDOGREL 75 MG: 75 TABLET, FILM COATED ORAL at 19:44

## 2018-02-10 RX ADMIN — OXYCODONE HYDROCHLORIDE 5 MG: 5 TABLET ORAL at 19:45

## 2018-02-10 RX ADMIN — FAMOTIDINE 20 MG: 20 TABLET, FILM COATED ORAL at 10:53

## 2018-02-10 RX ADMIN — FAMOTIDINE 20 MG: 20 TABLET, FILM COATED ORAL at 19:45

## 2018-02-10 RX ADMIN — ASPIRIN 81 MG: 81 TABLET, COATED ORAL at 10:53

## 2018-02-10 RX ADMIN — METOPROLOL TARTRATE 50 MG: 50 TABLET, FILM COATED ORAL at 10:53

## 2018-02-10 ASSESSMENT — LIFESTYLE VARIABLES
EVER FELT BAD OR GUILTY ABOUT YOUR DRINKING: NO
HAVE YOU EVER FELT YOU SHOULD CUT DOWN ON YOUR DRINKING: NO
ALCOHOL_USE: NO
HOW MANY TIMES IN THE PAST YEAR HAVE YOU HAD 5 OR MORE DRINKS IN A DAY: 0
HAVE PEOPLE ANNOYED YOU BY CRITICIZING YOUR DRINKING: NO
EVER FELT BAD OR GUILTY ABOUT YOUR DRINKING: NO
HAVE YOU EVER FELT YOU SHOULD CUT DOWN ON YOUR DRINKING: NO
DO YOU DRINK ALCOHOL: YES
TOTAL SCORE: 0
ON A TYPICAL DAY WHEN YOU DRINK ALCOHOL HOW MANY DRINKS DO YOU HAVE: 0
TOTAL SCORE: 0
TOTAL SCORE: 0
EVER HAD A DRINK FIRST THING IN THE MORNING TO STEADY YOUR NERVES TO GET RID OF A HANGOVER: NO
EVER HAD A DRINK FIRST THING IN THE MORNING TO STEADY YOUR NERVES TO GET RID OF A HANGOVER: NO
CONSUMPTION TOTAL: NEGATIVE
TOTAL SCORE: 0
HAVE PEOPLE ANNOYED YOU BY CRITICIZING YOUR DRINKING: NO
AVERAGE NUMBER OF DAYS PER WEEK YOU HAVE A DRINK CONTAINING ALCOHOL: 0
CONSUMPTION TOTAL: INCOMPLETE
TOTAL SCORE: 0
EVER_SMOKED: YES
TOTAL SCORE: 0

## 2018-02-10 ASSESSMENT — PAIN SCALES - GENERAL
PAINLEVEL_OUTOF10: 4
PAINLEVEL_OUTOF10: 0
PAINLEVEL_OUTOF10: 0

## 2018-02-10 NOTE — ED PROVIDER NOTES
ED Provider Note    Scribed for Angela Carpenter M.D. by Jesús Iglesias. 2/10/2018, 6:18 AM.    Primary care provider: FELIX Villa  Means of arrival: EMS  History obtained from: Patient  History limited by: None    CHIEF COMPLAINT  Chief Complaint   Patient presents with   • Leg Swelling     R leg   • Groin Swelling     Redness and swelling.        HPI  Galileo Torrez is a 77 y.o. male who presents to the Emergency Department complaining of increasing right lower extremity and groin swelling that began this morning. Patient received a right femoral popliteal bypass grafting performed by Dr. Austin, Vascular Surgery, three days ago. Today he was unable to put on his prosthetic leg secondary to the swelling prompting him to present here today. The patient reports associated right foot pain. He denies fever or chills. The patient lives alone at his home. He does not believe that he will be seen my a home health nurse.    REVIEW OF SYSTEMS  Pertinent positives include right lower extremity and groin swelling as well as right foot pain. Pertinent negatives include no fever or chills. All other systems reviewed and negative.   C    PAST MEDICAL HISTORY   has a past medical history of Abnormality of gait; Alcoholic polyneuropathy (CMS-HCC); Aortic valve disorders; Atherosclerosis of renal artery (CMS-HCC); Blood clotting disorder (CMS-HCC) (10/15/15); Bowel habit changes; Breath shortness; CAD (coronary artery disease) (May 2009); Cancer (CMS-HCC); Carotid artery stenosis (August 2014); Chronic systolic heart failure (CMS-HCC); COPD; Cough; Dental disorder; Dizziness; Fall; Heart burn; Hiatus hernia syndrome; High cholesterol; Hyperlipidemia; Hypertension; Insomnia, unspecified; Lumbago; Myocardial infarct (1995x2); Other specified forms of chronic ischemic heart disease; Peptic ulcer, unspecified site, unspecified as acute or chronic, without mention of hemorrhage, perforation, or  obstruction; Peripheral vascular disease, unspecified (February 2012); Pure hypercholesterolemia; Snoring; Stroke (CMS-HCC) (2011); Tobacco use disorder; Transient alteration of awareness; Umbilical hernia without mention of obstruction or gangrene; Unspecified disorder of kidney and ureter; Unspecified hemorrhagic conditions; 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    SURGICAL HISTORY   has a past surgical history that includes gastroscopy-endo (3/3/2010); endostat (3/3/2010); sclerotheraphy (3/3/2010); cardiac cath, right/left heart; multiple coronary artery bypass (1994); recovery (2/28/2012); recovery (1/15/2015); femoral popliteal bypass (2/11/2015); other (1984); groin exploration (3/11/2015); thrombectomy (3/24/2015); angiogram (3/24/2015); recovery (9/4/2015); femoral popliteal bypass (Left, 10/2/2015); angiogram (10/2/2015); toe amputation (Left, 11/9/2015); incision and drainage general (Left, 11/9/2015); femoral popliteal bypass (Left, 11/13/2015); angiogram (Left, 12/21/2015); femoral tibial bypass (Left, 12/21/2015); knee amputation below (Left, 12/22/2015); stump revision (Left, 5/11/2016); split thickness skin graft (Left, 8/25/2016); mass excision general (Left, 6/8/2017); flap closure (Left, 6/8/2017); and femoral popliteal bypass (Right, 2/7/2018).    SOCIAL HISTORY  Social History   Substance Use Topics   • Smoking status: Current Every Day Smoker     Packs/day: 1.00     Years: 55.00     Types: Cigarettes   • Smokeless tobacco: Never Used   • Alcohol use No      History   Drug Use No       FAMILY HISTORY  Family History   Problem Relation Age of Onset   • Heart Disease Mother    • Other Mother      brain surg   • Cancer Brother      melanoma       CURRENT MEDICATIONS  No current facility-administered medications on file prior to encounter.      Current Outpatient Prescriptions on File Prior to Encounter   Medication Sig Dispense Refill   • clopidogrel  "(PLAVIX) 75 MG Tab Take 75 mg by mouth every evening.     • ezetimibe (ZETIA) 10 MG Tab Take 10 mg by mouth every evening.     • amitriptyline (ELAVIL) 150 MG Tab Take 2 Tabs by mouth every bedtime. 180 Tab 3   • gabapentin (NEURONTIN) 300 MG Cap Take 2 Caps by mouth every bedtime. 180 Cap 0   • ranitidine (ZANTAC) 150 MG Tab Take 1 Tab by mouth 2 times a day. 180 Tab 3   • omeprazole (PRILOSEC) 20 MG delayed-release capsule Take 1 Cap by mouth every day. 90 Cap 3   • atorvastatin (LIPITOR) 80 MG tablet Take 1 Tab by mouth every evening. 90 Tab 3   • metoprolol (LOPRESSOR) 25 MG Tab Take 2 Tabs by mouth 2 times a day. 180 Tab 3   • aspirin EC (ECOTRIN) 81 MG Tablet Delayed Response Take 81 mg by mouth every morning. Indications: Heart Attack         ALLERGIES  Allergies   Allergen Reactions   • Neosporin Pain Relieving Unspecified     Pt states \"I get blisters\".       PHYSICAL EXAM  VITAL SIGNS: /60   Pulse 85   Temp 35.9 °C (96.7 °F)   Resp 16   Wt 93 kg (205 lb)   SpO2 96%   BMI 27.80 kg/m²     Constitutional:  Comfortably laying in bed, able to answer questions  HENT: Nose is normal in appearance without rhinorrhea, external ears are normal,  moist mucous membranes  Eyes: Anicteric,  pupils are equal round and reactive, there is no conjunctival drainage or pallor   Neck: The trachea is midline, there is no obvious mass or meningeal signs  Cardiovascular: Equal radial pulsation, regular rate and rhythm without murmurs gallops or rubs  Thorax & Lungs: Respiratory rate and effort are normal. There is normal chest excursion with respiration. No wheezes rhonchi or rales noted. Sternotomy scar.  Abdomen: Abdomen is normal in appearance, normal bowel sounds, no pain with cough, nonpulsatile  :  No CVA tenderness to palpation  Musculoskeletal: Left DKA. Actively moves all 4 extremities. Edema of the right lower extremity.  Skin: Visualized skin is warm, no rash. Right inner thigh incision with dressings " intact. Erythema around the upper incision and around the scrotal area with a wound vac in the right groin.  Neurologic:  Cranial nerves II through XII are intact there is no focal abnormality noted.  Psychiatric: Normal mood and mentation    DIAGNOSTIC STUDIES / PROCEDURES    LABS  Results for orders placed or performed during the hospital encounter of 02/10/18   CBC WITH DIFFERENTIAL   Result Value Ref Range    WBC 13.2 (H) 4.8 - 10.8 K/uL    RBC 4.74 4.70 - 6.10 M/uL    Hemoglobin 13.4 (L) 14.0 - 18.0 g/dL    Hematocrit 41.8 (L) 42.0 - 52.0 %    MCV 88.2 81.4 - 97.8 fL    MCH 28.3 27.0 - 33.0 pg    MCHC 32.1 (L) 33.7 - 35.3 g/dL    RDW 48.5 35.9 - 50.0 fL    Platelet Count 303 164 - 446 K/uL    MPV 8.8 (L) 9.0 - 12.9 fL    Neutrophils-Polys 73.80 (H) 44.00 - 72.00 %    Lymphocytes 13.90 (L) 22.00 - 41.00 %    Monocytes 7.00 0.00 - 13.40 %    Eosinophils 4.00 0.00 - 6.90 %    Basophils 0.80 0.00 - 1.80 %    Immature Granulocytes 0.50 0.00 - 0.90 %    Nucleated RBC 0.00 /100 WBC    Neutrophils (Absolute) 9.75 (H) 1.82 - 7.42 K/uL    Lymphs (Absolute) 1.83 1.00 - 4.80 K/uL    Monos (Absolute) 0.92 (H) 0.00 - 0.85 K/uL    Eos (Absolute) 0.53 (H) 0.00 - 0.51 K/uL    Baso (Absolute) 0.11 0.00 - 0.12 K/uL    Immature Granulocytes (abs) 0.06 0.00 - 0.11 K/uL    NRBC (Absolute) 0.00 K/uL   BMP   Result Value Ref Range    Sodium 137 135 - 145 mmol/L    Potassium 4.3 3.6 - 5.5 mmol/L    Chloride 104 96 - 112 mmol/L    Co2 24 20 - 33 mmol/L    Glucose 149 (H) 65 - 99 mg/dL    Bun 15 8 - 22 mg/dL    Creatinine 1.16 0.50 - 1.40 mg/dL    Calcium 8.6 8.5 - 10.5 mg/dL    Anion Gap 9.0 0.0 - 11.9   BNP   Result Value Ref Range    B Natriuretic Peptide 77 0 - 100 pg/mL   ESTIMATED GFR   Result Value Ref Range    GFR If African American >60 >60 mL/min/1.73 m 2    GFR If Non African American >60 >60 mL/min/1.73 m 2     All labs reviewed by me.    COURSE & MEDICAL DECISION MAKING  Nursing notes and vital signs were reviewed. (See  chart for details)  The patient's  records were reviewed and revealed that patient received right femoral popliteal bypass grafting performed by Dr. Austin, Vascular Surgery, three days ago, and he discharged home without antibiotics yesterday morning. history was obtained from the patient;     The patient presents with edema of the right lower extremity, and the differential diagnosis includes but is not limited to DVT, post-operative pain, inflammation, or early infection.       6:18 AM Initial orders in the Emergency Department included BMP, BNP, and CBC with differential.    6:36 AM Paged Dr. Austin, Vascular Surgery.    6:39 AM I discussed the patient's case and the above findings with Dr. Austin (Vascular Surgery) who agrees to evaluate the patient. He would like use to admit with holding orders and without ultrasound.    6:46 AM Additional work-up planned includes admittance to Dr. Austin and monitoring with admittance to the hospital.  This was discussed with the patient. Patient verbalizes understanding and agreement.      DISPOSITION:  Patient will be admitted to Dr. Austin (Vascular Surgery) in guarded condition.     FINAL IMPRESSION  Post operative groin swelling  PVD     I, Jesús Iglesias (Scribe), am scribing for, and in the presence of, Angela Carpenter M.D..    Electronically signed by: Jesús Iglesias (Scribe), 2/10/2018    IAngela M.D. personally performed the services described in this documentation, as scribed by Jesús Iglesias in my presence, and it is both accurate and complete.    The note accurately reflects work and decisions made by me.  Angela Carpenter  2/10/2018  9:31 AM

## 2018-02-10 NOTE — ED NOTES
Received report from [facility], taking over pt care. Pt sitting up comfortably . Bed in lowered position, side rails up, call light in reach

## 2018-02-10 NOTE — RESPIRATORY CARE
COPD EDUCATION by COPD CLINICAL EDUCATOR  2/10/2018 at 1:11 PM by Maria Elena Urias     Patient reviewed by COPD education team. Patient does not qualify for COPD program.

## 2018-02-10 NOTE — H&P
Date of Service:  2-10-18    PCP: FELIX Villa    CC:  Leg swelling status post bypass    HPI: This is a 77 y.o. male with a history of severe peripheral arterial disease. Patient has a left below-the-knee amputation and 3 days ago underwent a right femoral to popliteal artery bypass. The patient was discharged yesterday but returned to the emergency room with swelling in the right leg and scrotal area. He has some mild erythema of the leg. He generally also wishes failing to thrive.    ROS: As above. The remainder of a complete review of systems is negative in all systems except as noted.    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   • Shortness of breath 05/15/2015   • Aortic insufficiency 09/25/2015   • Pulmonary HTN 09/25/2015   • COPD (chronic obstructive pulmonary disease) (CMS-HCC) 09/25/2015   • Metabolic encephalopathy 10/05/2015   • Long QT interval 10/07/2015   • Deep vein thrombosis [I82.409] 10/22/2015   • 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   • Peptic ulcer disease 11/19/2015   • Peripheral arterial disease (CMS-HCC) 12/21/2015   • Wound infection 04/04/2016   • Amputation stump necrosis (CMS-HCC) 05/11/2016   • Pain, phantom limb (CMS-HCC) 07/26/2016   • Squamous cell carcinoma of skin of left ear and external auricular canal 06/08/2017   • Mixed hyperlipidemia 07/20/2017    • Need for vaccination 09/18/2017   • Cellulitis 11/24/2017   • Atherosclerosis of lower extremity with rest pain (CMS-HCC) 02/07/2018     Resolved Ambulatory Problems     Diagnosis Date Noted   • Transient alteration of awareness    • Hypertension    • Peptic ulcer    • Peripheral vascular disease (CMS-HCC)    • Syncope and collapse 12/07/2011   • Dyspepsia 01/24/2013   • ARF (acute renal failure) (CMS-HCC) 10/15/2014   • Metabolic encephalopathy 10/17/2014   • Cellulitis 10/24/2014   • Traumatic hematoma of lower back 10/25/2014   • Foot osteomyelitis, left (CMS-HCC) 12/11/2014   • Sepsis (CMS-HCC) 12/11/2014   • Atherosclerosis of native arteries of the extremities with ulceration (CMS-HCC) 01/15/2015   • Ischemic leg 03/24/2015   • Other and unspecified angina pectoris 09/04/2015   • Hematuria 09/25/2015   • Ischemic foot 10/05/2015   • Gangrene (CMS-HCC) 11/09/2015     Past Medical History:   Diagnosis Date   • Abnormality of gait    • Alcoholic polyneuropathy (CMS-HCC)    • Aortic valve disorders    • Atherosclerosis of renal artery (CMS-HCC)    • Blood clotting disorder (CMS-HCC) 10/15/15   • Bowel habit changes    • Breath shortness    • CAD (coronary artery disease) May 2009   • Cancer (CMS-HCC)    • Carotid artery stenosis August 2014   • Chronic systolic heart failure (CMS-HCC)    • COPD    • Cough    • Dental disorder    • Dizziness    • Fall    • Heart burn    • Hiatus hernia syndrome    • High cholesterol    • Hyperlipidemia    • Hypertension    • Insomnia, unspecified    • Lumbago    • Myocardial infarct 1995x2   • Other specified forms of chronic ischemic heart disease    • Peptic ulcer, unspecified site, unspecified as acute or chronic, without mention of hemorrhage, perforation, or obstruction    • Peripheral vascular disease, unspecified February 2012   • Pure hypercholesterolemia    • Snoring    • Stroke (CMS-HCC) 2011   • Tobacco use disorder    • Transient alteration of awareness    •  "Umbilical hernia without mention of obstruction or gangrene    • Unspecified disorder of kidney and ureter    • Unspecified hemorrhagic conditions    • Unspecified hypertensive heart disease without heart failure    • Upper GI bleed        SHx:  Social History     Social History   • Marital status:      Spouse name: N/A   • Number of children: N/A   • Years of education: N/A     Occupational History   • Not on file.     Social History Main Topics   • Smoking status: Current Every Day Smoker     Packs/day: 1.00     Years: 55.00     Types: Cigarettes   • Smokeless tobacco: Never Used   • Alcohol use No   • Drug use: No   • Sexual activity: Not Currently     Other Topics Concern   • Not on file     Social History Narrative   • No narrative on file       FHx:  family history includes Cancer in his brother; Heart Disease in his mother; Other in his mother.    Allergies:  Allergies   Allergen Reactions   • Neosporin Pain Relieving Unspecified     Pt states \"I get blisters\".       Medications:  No current facility-administered medications on file prior to encounter.      Current Outpatient Prescriptions on File Prior to Encounter   Medication Sig Dispense Refill   • clopidogrel (PLAVIX) 75 MG Tab Take 75 mg by mouth every evening.     • ezetimibe (ZETIA) 10 MG Tab Take 10 mg by mouth every evening.     • amitriptyline (ELAVIL) 150 MG Tab Take 2 Tabs by mouth every bedtime. 180 Tab 3   • gabapentin (NEURONTIN) 300 MG Cap Take 2 Caps by mouth every bedtime. 180 Cap 0   • ranitidine (ZANTAC) 150 MG Tab Take 1 Tab by mouth 2 times a day. 180 Tab 3   • omeprazole (PRILOSEC) 20 MG delayed-release capsule Take 1 Cap by mouth every day. 90 Cap 3   • atorvastatin (LIPITOR) 80 MG tablet Take 1 Tab by mouth every evening. 90 Tab 3   • metoprolol (LOPRESSOR) 25 MG Tab Take 2 Tabs by mouth 2 times a day. 180 Tab 3   • aspirin EC (ECOTRIN) 81 MG Tablet Delayed Response Take 81 mg by mouth every morning. Indications: Heart Attack "         Objective Exam:  Vitals:    02/10/18 0814 02/10/18 0831 02/10/18 0900 02/10/18 0931   BP:       Pulse: 80 79 85 86   Resp:       Temp:       SpO2: 92% 94% 92% 95%   Weight:           Review of Systems  Negative except for leg swelling      General: Awake and Alert  HEENT: normocephalic, atraumatic  Chest: Clear and equal bilaterally  CV: RRR  Abd: Soft, non-tender, nondistended  Ext: Warm, well perfused   Vasc: Right foot well perfused  Neuro: Intact  Right lower extremity has generalized swelling slight erythema    Laboratory--reviewed personally and are as follows:  Lab Results   Component Value Date/Time    WBC 13.2 (H) 02/10/2018 06:39 AM    RBC 4.74 02/10/2018 06:39 AM    HEMOGLOBIN 13.4 (L) 02/10/2018 06:39 AM    HEMATOCRIT 41.8 (L) 02/10/2018 06:39 AM    MCV 88.2 02/10/2018 06:39 AM    MCH 28.3 02/10/2018 06:39 AM    MCHC 32.1 (L) 02/10/2018 06:39 AM    MPV 8.8 (L) 02/10/2018 06:39 AM    NEUTSPOLYS 73.80 (H) 02/10/2018 06:39 AM    LYMPHOCYTES 13.90 (L) 02/10/2018 06:39 AM    MONOCYTES 7.00 02/10/2018 06:39 AM    EOSINOPHILS 4.00 02/10/2018 06:39 AM    BASOPHILS 0.80 02/10/2018 06:39 AM    HYPOCHROMIA 1+ 07/12/2012 09:08 AM    ANISOCYTOSIS 1+ 10/01/2015 02:39 AM      Lab Results   Component Value Date/Time    SODIUM 137 02/10/2018 06:39 AM    POTASSIUM 4.3 02/10/2018 06:39 AM    CHLORIDE 104 02/10/2018 06:39 AM    CO2 24 02/10/2018 06:39 AM    GLUCOSE 149 (H) 02/10/2018 06:39 AM    BUN 15 02/10/2018 06:39 AM    CREATININE 1.16 02/10/2018 06:39 AM      Lab Results   Component Value Date/Time    PROTHROMBTM 12.9 05/10/2016 10:03 AM    INR 0.97 05/10/2016 10:03 AM        Radiology  None    Plan- right leg swelling and slight erythema status post femoral to popliteal artery bypass. Plan will be to admit the patient to the hospital by compression with an Ace bandage administer IV antibiotics and will likely need some type of placement to skilled nursing or rehab.

## 2018-02-10 NOTE — ED TRIAGE NOTES
Chief Complaint   Patient presents with   • Leg Swelling     R leg   • Groin Swelling     Redness and swelling.      /60   Pulse 85   Temp 35.9 °C (96.7 °F)   Resp 16   Wt 93 kg (205 lb)   SpO2 96%   BMI 27.80 kg/m²     Pt brought in by AMY from home, pt reports swelling and redness to R leg and groin. Pt had R femoral popliteal artery bypass graft done on 2/7, wound vac in place. Denies pain at this time. Placed in room [address] 1. Complaints and vitals as above. Pt on monitors, all alarms audible. ERP at bedside.

## 2018-02-10 NOTE — ED NOTES
Med rec complete per pt at bedside   Allergies reviewed  Pt states he was just discharged from [facility] on Thursday and got IV ABX while admitted

## 2018-02-11 PROCEDURE — A9270 NON-COVERED ITEM OR SERVICE: HCPCS | Performed by: SURGERY

## 2018-02-11 PROCEDURE — 700105 HCHG RX REV CODE 258: Performed by: SURGERY

## 2018-02-11 PROCEDURE — 700111 HCHG RX REV CODE 636 W/ 250 OVERRIDE (IP): Performed by: SURGERY

## 2018-02-11 PROCEDURE — 700102 HCHG RX REV CODE 250 W/ 637 OVERRIDE(OP): Performed by: SURGERY

## 2018-02-11 PROCEDURE — 770006 HCHG ROOM/CARE - MED/SURG/GYN SEMI*

## 2018-02-11 RX ORDER — FUROSEMIDE 10 MG/ML
20 INJECTION INTRAMUSCULAR; INTRAVENOUS
Status: DISCONTINUED | OUTPATIENT
Start: 2018-02-11 | End: 2018-02-12 | Stop reason: HOSPADM

## 2018-02-11 RX ADMIN — AMITRIPTYLINE HYDROCHLORIDE 300 MG: 100 TABLET, FILM COATED ORAL at 20:09

## 2018-02-11 RX ADMIN — ASPIRIN 81 MG: 81 TABLET, COATED ORAL at 08:56

## 2018-02-11 RX ADMIN — EZETIMIBE 10 MG: 10 TABLET ORAL at 20:10

## 2018-02-11 RX ADMIN — FAMOTIDINE 20 MG: 20 TABLET, FILM COATED ORAL at 20:10

## 2018-02-11 RX ADMIN — OMEPRAZOLE 20 MG: 20 CAPSULE, DELAYED RELEASE ORAL at 08:57

## 2018-02-11 RX ADMIN — FAMOTIDINE 20 MG: 20 TABLET, FILM COATED ORAL at 08:57

## 2018-02-11 RX ADMIN — SODIUM CHLORIDE: 9 INJECTION, SOLUTION INTRAVENOUS at 12:13

## 2018-02-11 RX ADMIN — METOPROLOL TARTRATE 50 MG: 50 TABLET, FILM COATED ORAL at 08:57

## 2018-02-11 RX ADMIN — ATORVASTATIN CALCIUM 80 MG: 40 TABLET, FILM COATED ORAL at 20:10

## 2018-02-11 RX ADMIN — GABAPENTIN 600 MG: 300 CAPSULE ORAL at 20:10

## 2018-02-11 RX ADMIN — METOPROLOL TARTRATE 50 MG: 50 TABLET, FILM COATED ORAL at 20:10

## 2018-02-11 RX ADMIN — CLOPIDOGREL 75 MG: 75 TABLET, FILM COATED ORAL at 20:10

## 2018-02-11 RX ADMIN — OXYCODONE HYDROCHLORIDE 5 MG: 5 TABLET ORAL at 20:10

## 2018-02-11 RX ADMIN — FUROSEMIDE 20 MG: 10 INJECTION, SOLUTION INTRAMUSCULAR; INTRAVENOUS at 11:35

## 2018-02-11 ASSESSMENT — PAIN SCALES - GENERAL
PAINLEVEL_OUTOF10: 2
PAINLEVEL_OUTOF10: 2
PAINLEVEL_OUTOF10: 4

## 2018-02-11 NOTE — CARE PLAN
Problem: Safety  Goal: Will remain free from injury  Outcome: PROGRESSING AS EXPECTED  Treaded socks in place, bed in the lowest position, call light and belongings within reach, pt call for assistance appropriately    Problem: Infection  Goal: Will remain free from infection  Outcome: PROGRESSING AS EXPECTED  Pt. on Zosyn

## 2018-02-11 NOTE — PROGRESS NOTES
Pt accepted from [facility], a&ox4, vss, tolerating diet, voiding qs, prevena to right groin. Went over POC.

## 2018-02-11 NOTE — PROGRESS NOTES
Vascular Surgery     Awake alert  Feels much better  Leg swelling much improved   Erythema completely resolved   Diuresed with lasix     Administer another dose of lasix today   Continue to wrap leg with ace bandage   Continue IV antibiotics     Lance Austin MD

## 2018-02-12 VITALS
WEIGHT: 205 LBS | DIASTOLIC BLOOD PRESSURE: 64 MMHG | RESPIRATION RATE: 16 BRPM | BODY MASS INDEX: 27.8 KG/M2 | TEMPERATURE: 97.4 F | HEART RATE: 90 BPM | OXYGEN SATURATION: 99 % | SYSTOLIC BLOOD PRESSURE: 106 MMHG

## 2018-02-12 PROCEDURE — 700102 HCHG RX REV CODE 250 W/ 637 OVERRIDE(OP): Performed by: SURGERY

## 2018-02-12 PROCEDURE — 700111 HCHG RX REV CODE 636 W/ 250 OVERRIDE (IP): Performed by: SURGERY

## 2018-02-12 PROCEDURE — A9270 NON-COVERED ITEM OR SERVICE: HCPCS | Performed by: SURGERY

## 2018-02-12 PROCEDURE — 700105 HCHG RX REV CODE 258: Performed by: SURGERY

## 2018-02-12 RX ORDER — FUROSEMIDE 20 MG/1
20 TABLET ORAL DAILY
Qty: 30 TAB | Refills: 0 | Status: SHIPPED | OUTPATIENT
Start: 2018-02-12

## 2018-02-12 RX ORDER — POTASSIUM CHLORIDE 20 MEQ/1
20 TABLET, EXTENDED RELEASE ORAL DAILY
Qty: 30 TAB | Refills: 0 | Status: SHIPPED | OUTPATIENT
Start: 2018-02-12

## 2018-02-12 RX ADMIN — OMEPRAZOLE 20 MG: 20 CAPSULE, DELAYED RELEASE ORAL at 09:10

## 2018-02-12 RX ADMIN — METOPROLOL TARTRATE 50 MG: 50 TABLET, FILM COATED ORAL at 09:10

## 2018-02-12 RX ADMIN — ASPIRIN 81 MG: 81 TABLET, COATED ORAL at 09:10

## 2018-02-12 RX ADMIN — SODIUM CHLORIDE: 9 INJECTION, SOLUTION INTRAVENOUS at 09:11

## 2018-02-12 RX ADMIN — FAMOTIDINE 20 MG: 20 TABLET, FILM COATED ORAL at 09:10

## 2018-02-12 RX ADMIN — FUROSEMIDE 20 MG: 10 INJECTION, SOLUTION INTRAMUSCULAR; INTRAVENOUS at 09:18

## 2018-02-12 ASSESSMENT — PAIN SCALES - GENERAL: PAINLEVEL_OUTOF10: 1

## 2018-02-12 ASSESSMENT — LIFESTYLE VARIABLES: EVER_SMOKED: YES

## 2018-02-12 NOTE — DISCHARGE INSTRUCTIONS
1. DIET: Upon discharge from the hospital you may resume your normal preoperative diet. Depending on how you are feeling and whether you have nausea or not, you may wish to stay with a bland diet for the first few days. However, you can advance this as quickly as you feel ready.     2. ACTIVITIES: After discharge from the hospital, you may resume full routine activities. However, there should be no heavy lifting (greater than 15 pounds) and no strenuous activities until after your follow-up visit. Otherwise, routine activities of daily living are acceptable.     3. DRIVING: You may drive whenever you are off pain medications and are able to perform the activities needed to drive, i.e. turning, bending, twisting, etc.     4. BATHING: You may get the wound wet at any time after leaving the hospital. You may shower, but do not submerge in a bath for at least a week. Dressings may come off after 48 hours. Leave steri strips on until they fall off.  It may be necessary to trim the edges.     5. BOWEL FUNCTION: Constipation is common after an operation, especially with pain medications. The combination of pain medication and decreased activity level can cause constipation in otherwise normal patients. If you feel this is occurring, take a laxative (Milk of Magnesia, Ex-Lax, Senokot, etc.) until the problem has resolved.     6. PAIN MEDICATION: You will be given a prescription for pain medication at discharge. Please take these as directed. It is important to remember not to take medications on an empty stomach as this may cause nausea.     7.CALL IF YOU HAVE: (1) Fevers to more than 1010 F, (2) Unusual chest or leg pain, (3) Drainage or fluid from incision that may be foul smelling, increased tenderness or soreness at the wound or the wound edges are no longer together, redness or swelling at the incision site. Please do not hesitate to call with any other questions.     8. APPOINTMENT: Contact our office at [phone]  for a follow-up appointment in 3 days.     __Rewrap ACE bandage daily___     If you have any additional questions, please do not hesitate to call the office and speak to either myself or the physician on call.     Office address:   [id number]   Lance Austin MD   [facility]   [phone]                   Briana Catie     Discharge Instructions    Discharged to home by car with relative. Discharged via wheelchair, hospital escort: Yes.  Special equipment needed: Not Applicable    Be sure to schedule a follow-up appointment with your primary care doctor or any specialists as instructed.     Discharge Plan:   Smoking Cessation Offered: Patient Refused  Influenza Vaccine Indication: Not indicated: Previously immunized this influenza season and > 8 years of age    I understand that a diet low in cholesterol, fat, and sodium is recommended for good health. Unless I have been given specific instructions below for another diet, I accept this instruction as my diet prescription.   Other diet: Regular diet as tolerated    Special Instructions: None    · Is patient discharged on Warfarin / Coumadin?   No     Depression / Suicide Risk    As you are discharged from this [facility] facility, it is important to learn how to keep safe from harming yourself.    Recognize the warning signs:  · Abrupt changes in personality, positive or negative- including increase in energy   · Giving away possessions  · Change in eating patterns- significant weight changes-  positive or negative  · Change in sleeping patterns- unable to sleep or sleeping all the time   · Unwillingness or inability to communicate  · Depression  · Unusual sadness, discouragement and loneliness  · Talk of wanting to die  · Neglect of personal appearance   · Rebelliousness- reckless behavior  · Withdrawal from people/activities they love  · Confusion- inability to concentrate     If you or a loved one observes any of these behaviors or has  concerns about self-harm, here's what you can do:  · Talk about it- your feelings and reasons for harming yourself  · Remove any means that you might use to hurt yourself (examples: pills, rope, extension cords, firearm)  · Get professional help from the community (Mental Health, Substance Abuse, psychological counseling)  · Do not be alone:Call your Safe Contact- someone whom you trust who will be there for you.  · Call your local CRISIS HOTLINE 443-3894 or 639-723-5429  · Call your local Children's Mobile Crisis Response Team [place] (056) 770-1185 or www.[employer].com  · Call the toll free National Suicide Prevention Hotlines   · National Suicide Prevention Lifeline 705-572-CPJQ (6059)  · National Hope Line Network 800-SUICIDE (941-6167)

## 2018-02-12 NOTE — DISCHARGE SUMMARY
CHIEF COMPLAINT ON ADMISSION  Chief Complaint   Patient presents with   • Leg Swelling     R leg   • Groin Swelling     Redness and swelling.        CODE STATUS  Full Code    HPI & HOSPITAL COURSE  This is a 77 y.o. male here with PAD.     The patient met 2-midnight criteria for an inpatient stay at the time of discharge.    Therefore, he is discharged in good and stable condition with close outpatient follow-up.    SPECIFIC OUTPATIENT FOLLOW-UP  3 days      DISCHARGE PROBLEM LIST  Active Problems:    Edema POA: Unknown  Resolved Problems:    * No resolved hospital problems. *      FOLLOW UP  No future appointments.  Lance Austin M.D.  [address]  [address]  [facility] [address]  [phone]    In 3 days  For wound re-check      MEDICATIONS ON DISCHARGE   Galileo Torrez   Home Medication Instructions [place]:[id number]    Printed on:02/12/18 1150   Medication Information                      amitriptyline (ELAVIL) 150 MG Tab  Take 2 Tabs by mouth every bedtime.             aspirin EC (ECOTRIN) 81 MG Tablet Delayed Response  Take 81 mg by mouth every morning. Indications: Heart Attack             atorvastatin (LIPITOR) 80 MG tablet  Take 1 Tab by mouth every evening.             clopidogrel (PLAVIX) 75 MG Tab  Take 75 mg by mouth every evening.             ezetimibe (ZETIA) 10 MG Tab  Take 10 mg by mouth every evening.             furosemide (LASIX) 20 MG Tab  Take 1 Tab by mouth every day.             gabapentin (NEURONTIN) 300 MG Cap  Take 2 Caps by mouth every bedtime.             metoprolol (LOPRESSOR) 25 MG Tab  Take 2 Tabs by mouth 2 times a day.             omeprazole (PRILOSEC) 20 MG delayed-release capsule  Take 1 Cap by mouth every day.             potassium chloride SA (KDUR) 20 MEQ Tab CR  Take 1 Tab by mouth every day.             ranitidine (ZANTAC) 150 MG Tab  Take 1 Tab by mouth 2 times a day.                 DIET  Orders Placed This Encounter   Procedures   • DIET ORDER     Standing  Status:   Standing     Number of Occurrences:   1     Order Specific Question:   Diet:     Answer:   Regular [1]       ACTIVITY  As tolerated.  Weight bearing as tolerated      CONSULTATIONS      PROCEDURES  Swelling; erythema resolved.    LABORATORY  Lab Results   Component Value Date/Time    SODIUM 137 02/10/2018 06:39 AM    POTASSIUM 4.3 02/10/2018 06:39 AM    CHLORIDE 104 02/10/2018 06:39 AM    CO2 24 02/10/2018 06:39 AM    GLUCOSE 149 (H) 02/10/2018 06:39 AM    BUN 15 02/10/2018 06:39 AM    CREATININE 1.16 02/10/2018 06:39 AM        Lab Results   Component Value Date/Time    WBC 13.2 (H) 02/10/2018 06:39 AM    HEMOGLOBIN 13.4 (L) 02/10/2018 06:39 AM    HEMATOCRIT 41.8 (L) 02/10/2018 06:39 AM    PLATELETCT 303 02/10/2018 06:39 AM        Total time of the discharge process exceeds 31 minutes

## 2018-02-13 ENCOUNTER — PATIENT OUTREACH (OUTPATIENT)
Dept: HEALTH INFORMATION MANAGEMENT | Facility: OTHER | Age: 78
End: 2018-02-13

## 2018-02-14 ENCOUNTER — PATIENT OUTREACH (OUTPATIENT)
Dept: HEALTH INFORMATION MANAGEMENT | Facility: OTHER | Age: 78
End: 2018-02-14

## 2018-02-26 DIAGNOSIS — G62.9 NEUROPATHY: ICD-10-CM

## 2018-02-26 RX ORDER — GABAPENTIN 300 MG/1
CAPSULE ORAL
Qty: 60 CAP | Refills: 5 | Status: SHIPPED | OUTPATIENT
Start: 2018-02-26

## 2018-03-26 ENCOUNTER — PATIENT OUTREACH (OUTPATIENT)
Dept: HEALTH INFORMATION MANAGEMENT | Facility: OTHER | Age: 78
End: 2018-03-26

## 2018-03-26 NOTE — PROGRESS NOTES
Galileo Torrez was originally admitted on 2/7/18 for elective femoral popliteal bypass and discharged home on 2/9/18, patient then readmitted on 2/10/18 for leg and growing swelling and discharged home on 2/12/18. [facility] patient advocate assisted with multiple discharge needs including 3 appointments, 2 surgeon appointments, 1 primary care physician appointment. Of the 3 appointments the patient kept 2 and cancelled his primary care physician appointment. PPS Screening was conducted.

## 2018-03-27 ENCOUNTER — HOSPITAL ENCOUNTER (OUTPATIENT)
Dept: HOSPITAL 8 - CFH | Age: 78
Discharge: HOME | End: 2018-03-27
Attending: NURSE PRACTITIONER
Payer: MEDICARE

## 2018-03-27 DIAGNOSIS — K80.20: ICD-10-CM

## 2018-03-27 DIAGNOSIS — F17.210: ICD-10-CM

## 2018-03-27 DIAGNOSIS — N20.9: ICD-10-CM

## 2018-03-27 DIAGNOSIS — R91.1: ICD-10-CM

## 2018-03-27 DIAGNOSIS — Z12.2: Primary | ICD-10-CM

## 2018-03-27 PROCEDURE — G0297 LDCT FOR LUNG CA SCREEN: HCPCS

## 2018-04-12 ENCOUNTER — HOSPITAL ENCOUNTER (OUTPATIENT)
Dept: RADIOLOGY | Facility: MEDICAL CENTER | Age: 78
End: 2018-04-12
Attending: NURSE PRACTITIONER
Payer: MEDICARE

## 2018-04-12 DIAGNOSIS — M54.16 LUMBAR RADICULOPATHY: ICD-10-CM

## 2018-04-12 PROCEDURE — 72110 X-RAY EXAM L-2 SPINE 4/>VWS: CPT

## 2018-04-16 ENCOUNTER — APPOINTMENT (OUTPATIENT)
Dept: RADIOLOGY | Facility: MEDICAL CENTER | Age: 78
End: 2018-04-16
Attending: EMERGENCY MEDICINE
Payer: MEDICARE

## 2018-04-16 ENCOUNTER — HOSPITAL ENCOUNTER (EMERGENCY)
Facility: MEDICAL CENTER | Age: 78
End: 2018-04-16
Attending: EMERGENCY MEDICINE
Payer: MEDICARE

## 2018-04-16 VITALS
HEIGHT: 72 IN | WEIGHT: 205 LBS | BODY MASS INDEX: 27.77 KG/M2 | RESPIRATION RATE: 17 BRPM | DIASTOLIC BLOOD PRESSURE: 67 MMHG | HEART RATE: 80 BPM | TEMPERATURE: 96.9 F | SYSTOLIC BLOOD PRESSURE: 134 MMHG | OXYGEN SATURATION: 95 %

## 2018-04-16 DIAGNOSIS — R10.84 GENERALIZED ABDOMINAL PAIN: ICD-10-CM

## 2018-04-16 LAB
ALBUMIN SERPL BCP-MCNC: 4.3 G/DL (ref 3.2–4.9)
ALBUMIN/GLOB SERPL: 1.2 G/DL
ALP SERPL-CCNC: 214 U/L (ref 30–99)
ALT SERPL-CCNC: 15 U/L (ref 2–50)
ANION GAP SERPL CALC-SCNC: 10 MMOL/L (ref 0–11.9)
AST SERPL-CCNC: 22 U/L (ref 12–45)
BASOPHILS # BLD AUTO: 1.2 % (ref 0–1.8)
BASOPHILS # BLD: 0.12 K/UL (ref 0–0.12)
BILIRUB SERPL-MCNC: 0.7 MG/DL (ref 0.1–1.5)
BUN SERPL-MCNC: 12 MG/DL (ref 8–22)
CALCIUM SERPL-MCNC: 9.4 MG/DL (ref 8.5–10.5)
CHLORIDE SERPL-SCNC: 103 MMOL/L (ref 96–112)
CO2 SERPL-SCNC: 23 MMOL/L (ref 20–33)
CREAT SERPL-MCNC: 1.31 MG/DL (ref 0.5–1.4)
EOSINOPHIL # BLD AUTO: 0.28 K/UL (ref 0–0.51)
EOSINOPHIL NFR BLD: 2.7 % (ref 0–6.9)
ERYTHROCYTE [DISTWIDTH] IN BLOOD BY AUTOMATED COUNT: 47.7 FL (ref 35.9–50)
GLOBULIN SER CALC-MCNC: 3.7 G/DL (ref 1.9–3.5)
GLUCOSE SERPL-MCNC: 108 MG/DL (ref 65–99)
HCT VFR BLD AUTO: 46.6 % (ref 42–52)
HGB BLD-MCNC: 15.6 G/DL (ref 14–18)
IMM GRANULOCYTES # BLD AUTO: 0.04 K/UL (ref 0–0.11)
IMM GRANULOCYTES NFR BLD AUTO: 0.4 % (ref 0–0.9)
LACTATE BLD-SCNC: 1.4 MMOL/L (ref 0.5–2)
LIPASE SERPL-CCNC: 22 U/L (ref 11–82)
LYMPHOCYTES # BLD AUTO: 2.32 K/UL (ref 1–4.8)
LYMPHOCYTES NFR BLD: 22.3 % (ref 22–41)
MCH RBC QN AUTO: 29.1 PG (ref 27–33)
MCHC RBC AUTO-ENTMCNC: 33.5 G/DL (ref 33.7–35.3)
MCV RBC AUTO: 86.8 FL (ref 81.4–97.8)
MONOCYTES # BLD AUTO: 0.8 K/UL (ref 0–0.85)
MONOCYTES NFR BLD AUTO: 7.7 % (ref 0–13.4)
NEUTROPHILS # BLD AUTO: 6.85 K/UL (ref 1.82–7.42)
NEUTROPHILS NFR BLD: 65.7 % (ref 44–72)
NRBC # BLD AUTO: 0 K/UL
NRBC BLD-RTO: 0 /100 WBC
PLATELET # BLD AUTO: 339 K/UL (ref 164–446)
PMV BLD AUTO: 8.8 FL (ref 9–12.9)
POTASSIUM SERPL-SCNC: 3.6 MMOL/L (ref 3.6–5.5)
PROT SERPL-MCNC: 8 G/DL (ref 6–8.2)
RBC # BLD AUTO: 5.37 M/UL (ref 4.7–6.1)
SODIUM SERPL-SCNC: 136 MMOL/L (ref 135–145)
WBC # BLD AUTO: 10.4 K/UL (ref 4.8–10.8)

## 2018-04-16 PROCEDURE — 83605 ASSAY OF LACTIC ACID: CPT

## 2018-04-16 PROCEDURE — 71045 X-RAY EXAM CHEST 1 VIEW: CPT

## 2018-04-16 PROCEDURE — 99284 EMERGENCY DEPT VISIT MOD MDM: CPT

## 2018-04-16 PROCEDURE — 80053 COMPREHEN METABOLIC PANEL: CPT

## 2018-04-16 PROCEDURE — 85025 COMPLETE CBC W/AUTO DIFF WBC: CPT

## 2018-04-16 PROCEDURE — 74177 CT ABD & PELVIS W/CONTRAST: CPT

## 2018-04-16 PROCEDURE — 36415 COLL VENOUS BLD VENIPUNCTURE: CPT

## 2018-04-16 PROCEDURE — 700117 HCHG RX CONTRAST REV CODE 255: Performed by: EMERGENCY MEDICINE

## 2018-04-16 PROCEDURE — 83690 ASSAY OF LIPASE: CPT

## 2018-04-16 RX ADMIN — IOHEXOL 100 ML: 350 INJECTION, SOLUTION INTRAVENOUS at 03:33

## 2018-04-16 RX ADMIN — IOHEXOL 50 ML: 240 INJECTION, SOLUTION INTRATHECAL; INTRAVASCULAR; INTRAVENOUS; ORAL at 03:33

## 2018-04-16 ASSESSMENT — PAIN SCALES - GENERAL: PAINLEVEL_OUTOF10: 4

## 2018-04-16 ASSESSMENT — LIFESTYLE VARIABLES: DO YOU DRINK ALCOHOL: NO

## 2018-04-16 NOTE — DISCHARGE INSTRUCTIONS
Abdominal Pain, Adult  Abdominal pain can be caused by many things. Often, abdominal pain is not serious and it gets better with no treatment or by being treated at home. However, sometimes abdominal pain is serious. Your health care provider will do a medical history and a physical exam to try to determine the cause of your abdominal pain.  Follow these instructions at home:  · Take over-the-counter and prescription medicines only as told by your health care provider. Do not take a laxative unless told by your health care provider.  · Drink enough fluid to keep your urine clear or pale yellow.  · Watch your condition for any changes.  · Keep all follow-up visits as told by your health care provider. This is important.  Contact a health care provider if:  · Your abdominal pain changes or gets worse.  · You are not hungry or you lose weight without trying.  · You are constipated or have diarrhea for more than 2-3 days.  · You have pain when you urinate or have a bowel movement.  · Your abdominal pain wakes you up at night.  · Your pain gets worse with meals, after eating, or with certain foods.  · You are throwing up and cannot keep anything down.  · You have a fever.  Get help right away if:  · Your pain does not go away as soon as your health care provider told you to expect.  · You cannot stop throwing up.  · Your pain is only in areas of the abdomen, such as the right side or the left lower portion of the abdomen.  · You have bloody or black stools, or stools that look like tar.  · You have severe pain, cramping, or bloating in your abdomen.  · You have signs of dehydration, such as:  ¨ Dark urine, very little urine, or no urine.  ¨ Cracked lips.  ¨ Dry mouth.  ¨ Sunken eyes.  ¨ Sleepiness.  ¨ Weakness.  This information is not intended to replace advice given to you by your health care provider. Make sure you discuss any questions you have with your health care provider.  Document Released: 09/27/2006 Document  Revised: 07/07/2017 Document Reviewed: 05/31/2017  Else"[employer] Interactive Patient Education © 2017 [employer].

## 2018-04-16 NOTE — ED NOTES
PT BIB from home by [facility] for abd mid pains 9/10, pt points to chronic umbilical hernia present on arrival. No discoloration, abnormal temperature to area. Pt states it comes and goes. ERP at bedside on arrival. Second PIV placed for CT abd. Pt is A&Ox4, GCS 15, BKA LLE. VSS

## 2018-04-16 NOTE — ED NOTES
Patient discharged home with self, A&O, VSS. Ambulatory. Home by self-pay cab. Verbalizes understanding of discharge instructions with opportunity for questions and concerns.

## 2018-04-16 NOTE — ED PROVIDER NOTES
CHIEF COMPLAINT  Chief Complaint   Patient presents with   • Abdominal Pain     acute on set at 0100 sharp pains 9/10 that comes and goes   • Cough     chronic        HPI  Galileo Torrez is a 77 y.o. male who presents for evaluation of abdominal pain. Patient started approximately 30 minutes ago and is off and on. It is centered around his previously known umbilical hernia. Patient notes he also has a chronic cough which is not new or changed today. He denies any nausea, vomiting, diarrhea, fevers, blood in the stool, or dark tarry stools.     REVIEW OF SYSTEMS  Constitutional: No fevers, weakness, weight loss   Skin: No rashes, abrasions, lacerations, or pruritus  HEENT: No diplopia or blurred vision, no eye pain, no discharge. No ear pain, ringing in ears, or decreased hearing. No sore throat, runny nose, sores, trouble swallowing, trouble speaking.  Neck: No neck pain, stiffness, or masses.  Chest: No pain or rashes  Pulm: No shortness of breath, wheezing, stridor, or pain with inspiration/expiration  Gastrointestinal: No nausea, vomiting, diarrhea, constipation, bloating, melena, hematochezia  Genitourinary: No pain, urgency, frequency, dysuria, hematuria, or polyuria.   Musculoskeletal: No recent trauma, pain, swelling, weakness  Neurologic: No sensory or motor changes. No confusion or disorientation.  Heme: No bleeding or bruising problems.   Immuno: No hx of recurrent infections      PAST MEDICAL HISTORY   has a past medical history of Abnormality of gait; Alcoholic polyneuropathy (CMS-HCC); Aortic valve disorders; Atherosclerosis of renal artery (CMS-HCC); Blood clotting disorder (CMS-HCC) (10/15/15); Bowel habit changes; Breath shortness; CAD (coronary artery disease) (May 2009); Cancer (CMS-HCC); Carotid artery stenosis (August 2014); Chronic systolic heart failure (CMS-HCC); COPD; Cough; Dental disorder; Dizziness; Fall; Heart burn; Hiatus hernia syndrome; High cholesterol; Hyperlipidemia;  Hypertension; Insomnia, unspecified; Lumbago; Myocardial infarct (1995x2); Other specified forms of chronic ischemic heart disease; Peptic ulcer, unspecified site, unspecified as acute or chronic, without mention of hemorrhage, perforation, or obstruction; Peripheral vascular disease, unspecified (February 2012); Pure hypercholesterolemia; Snoring; Stroke (CMS-HCC) (2011); Tobacco use disorder; Transient alteration of awareness; Umbilical hernia without mention of obstruction or gangrene; Unspecified disorder of kidney and ureter; Unspecified hemorrhagic conditions; 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    SOCIAL HISTORY  Social History     Social History Main Topics   • Smoking status: Current Every Day Smoker     Packs/day: 1.00     Years: 55.00     Types: Cigarettes   • Smokeless tobacco: Never Used   • Alcohol use No   • Drug use: No   • Sexual activity: Not Currently       SURGICAL HISTORY   has a past surgical history that includes gastroscopy-endo (3/3/2010); endostat (3/3/2010); sclerotheraphy (3/3/2010); cardiac cath, right/left heart; multiple coronary artery bypass (1994); recovery (2/28/2012); recovery (1/15/2015); femoral popliteal bypass (2/11/2015); other (1984); groin exploration (3/11/2015); thrombectomy (3/24/2015); angiogram (3/24/2015); recovery (9/4/2015); femoral popliteal bypass (Left, 10/2/2015); angiogram (10/2/2015); toe amputation (Left, 11/9/2015); incision and drainage general (Left, 11/9/2015); femoral popliteal bypass (Left, 11/13/2015); angiogram (Left, 12/21/2015); femoral tibial bypass (Left, 12/21/2015); knee amputation below (Left, 12/22/2015); stump revision (Left, 5/11/2016); split thickness skin graft (Left, 8/25/2016); mass excision general (Left, 6/8/2017); flap closure (Left, 6/8/2017); and femoral popliteal bypass (Right, 2/7/2018).    CURRENT MEDICATIONS  Home Medications    **Home medications have not yet been reviewed for this  "encounter**         ALLERGIES  Allergies   Allergen Reactions   • Neosporin Pain Relieving Unspecified     Pt states \"I get blisters\".       PHYSICAL EXAM  VITAL SIGNS: /67   Pulse 80   Temp 36 °C (96.8 °F)   Resp 17   Ht 1.829 m (6')   Wt 93 kg (205 lb)   SpO2 95%   BMI 27.80 kg/m²    Gen: Alert in no apparent distress.  HEENT: No signs of trauma, Bilateral external ears normal, Nose normal. Conjunctiva normal, Non-icteric.   Neck:  No tenderness, Supple, No masses  Lymphatic: No cervical lymphadenopathy noted.   Cardiovascular: Regular rate and rhythm, no murmurs.   Thorax & Lungs: Productive cough. Normal breath sounds, No respiratory distress, No wheezing bilateral chest rise  Abdomen: 4 cm diameter umbilical hernia. Slightly protruding. No erythema. Does not appear to be reducible but is not tender. Does not cause patient stressed to attempt to reduce. Bowel sounds normal, Soft, No tenderness, No pulsatile masses. No Guarding or rebound  Skin: Warm, Dry, No erythema, brawny erythema to right lower extremity.   Extremities: Left AKA noted. Intact distal pulses to radial arteries bilaterally, and right dorsalis pedis., No edema, No tenderness, range of motion grossly normal all remaining ext. No tenderness to palpation or major deformities noted.   Neurologic: Alert , no facial droop, grossly normal coordination and strength  Psychiatric: Affect normal, Judgment normal, Mood normal.       DIAGNOSTIC STUDIES / PROCEDURES    EKG      LABS  Results for orders placed or performed during the hospital encounter of 04/16/18   CBC WITH DIFFERENTIAL   Result Value Ref Range    WBC 10.4 4.8 - 10.8 K/uL    RBC 5.37 4.70 - 6.10 M/uL    Hemoglobin 15.6 14.0 - 18.0 g/dL    Hematocrit 46.6 42.0 - 52.0 %    MCV 86.8 81.4 - 97.8 fL    MCH 29.1 27.0 - 33.0 pg    MCHC 33.5 (L) 33.7 - 35.3 g/dL    RDW 47.7 35.9 - 50.0 fL    Platelet Count 339 164 - 446 K/uL    MPV 8.8 (L) 9.0 - 12.9 fL    Neutrophils-Polys 65.70 44.00 - " 72.00 %    Lymphocytes 22.30 22.00 - 41.00 %    Monocytes 7.70 0.00 - 13.40 %    Eosinophils 2.70 0.00 - 6.90 %    Basophils 1.20 0.00 - 1.80 %    Immature Granulocytes 0.40 0.00 - 0.90 %    Nucleated RBC 0.00 /100 WBC    Neutrophils (Absolute) 6.85 1.82 - 7.42 K/uL    Lymphs (Absolute) 2.32 1.00 - 4.80 K/uL    Monos (Absolute) 0.80 0.00 - 0.85 K/uL    Eos (Absolute) 0.28 0.00 - 0.51 K/uL    Baso (Absolute) 0.12 0.00 - 0.12 K/uL    Immature Granulocytes (abs) 0.04 0.00 - 0.11 K/uL    NRBC (Absolute) 0.00 K/uL   COMP METABOLIC PANEL   Result Value Ref Range    Sodium 136 135 - 145 mmol/L    Potassium 3.6 3.6 - 5.5 mmol/L    Chloride 103 96 - 112 mmol/L    Co2 23 20 - 33 mmol/L    Anion Gap 10.0 0.0 - 11.9    Glucose 108 (H) 65 - 99 mg/dL    Bun 12 8 - 22 mg/dL    Creatinine 1.31 0.50 - 1.40 mg/dL    Calcium 9.4 8.5 - 10.5 mg/dL    AST(SGOT) 22 12 - 45 U/L    ALT(SGPT) 15 2 - 50 U/L    Alkaline Phosphatase 214 (H) 30 - 99 U/L    Total Bilirubin 0.7 0.1 - 1.5 mg/dL    Albumin 4.3 3.2 - 4.9 g/dL    Total Protein 8.0 6.0 - 8.2 g/dL    Globulin 3.7 (H) 1.9 - 3.5 g/dL    A-G Ratio 1.2 g/dL   LIPASE   Result Value Ref Range    Lipase 22 11 - 82 U/L   LACTIC ACID   Result Value Ref Range    Lactic Acid 1.4 0.5 - 2.0 mmol/L   ESTIMATED GFR   Result Value Ref Range    GFR If African American >60 >60 mL/min/1.73 m 2    GFR If Non  53 (A) >60 mL/min/1.73 m 2       RADIOLOGY  CT-ABDOMEN-PELVIS WITH   Final Result      1.  Fatty umbilical hernia without any evidence of inflammation.   2.  There is no small or large bowel obstruction.   3.  Bilateral nonobstructive renal stones.   4.  Atherosclerotic calcifications in the abdominal aorta and iliac arteries.   5.  Postsurgical changes in the bilateral superficial femoral arteries.   6.  Degenerative changes in the lumbar spine.   7.  There is 4 mm sized nodule in the right lower lung zone.      Low Risk: No routine follow-up      High Risk: Optional CT at 12 months       Comments: Nodules less than 6 mm do not require routine follow-up, but certain patients at high risk with suspicious nodule morphology, upper lobe location, or both may warrant 12-month follow-up.      Low Risk - Minimal or absent history of smoking and of other known risk factors.      High Risk - History of smoking or of other known risk factors.      Note: These recommendations do not apply to lung cancer screening, patients with immunosuppression, or patients with known primary cancer.      Fleischner Society 2017 Guidelines for Management of Incidentally Detected Pulmonary Nodules in Adults      DX-CHEST-PORTABLE (1 VIEW)   Final Result      No acute cardiac or pulmonary abnormalities are identified.        Reevaluation   Time:4:24 AM  Vital signs:   Assessment:no pain or abdominal tenderness    COURSE & MEDICAL DECISION MAKING  Pertinent Labs & Imaging studies reviewed. (See chart for details)  Patient has no findings today to suggest an emergent etiology to explain his symptoms and specifically does not appear to have any herniated strength relation. He does not small bowel obstruction and does not appear septic or toxic. I do not suspect his symptoms are related to acute vascular injury and I do not feel further imaging is necessary emergently. The patient was nontoxic and nondistressed on reevaluation and had no peritoneal signs or abdominal pain. I feel is safe for discharge with supportive treatment and outpatient follow-up.     FINAL IMPRESSION  1. Abdominal pain  2.   3.         Electronically signed by: Jaiden Munson, 4/16/2018 1:36 AM

## 2018-04-17 ENCOUNTER — PATIENT OUTREACH (OUTPATIENT)
Dept: HEALTH INFORMATION MANAGEMENT | Facility: OTHER | Age: 78
End: 2018-04-17

## 2018-04-19 ENCOUNTER — HOSPITAL ENCOUNTER (OUTPATIENT)
Dept: LAB | Facility: MEDICAL CENTER | Age: 78
End: 2018-04-19
Attending: INTERNAL MEDICINE
Payer: MEDICARE

## 2018-04-19 DIAGNOSIS — E78.2 MIXED HYPERLIPIDEMIA: ICD-10-CM

## 2018-04-19 DIAGNOSIS — I10 ESSENTIAL HYPERTENSION: ICD-10-CM

## 2018-04-19 DIAGNOSIS — I25.10 CORONARY ARTERY DISEASE INVOLVING NATIVE CORONARY ARTERY OF NATIVE HEART WITHOUT ANGINA PECTORIS: ICD-10-CM

## 2018-04-19 LAB
ALBUMIN SERPL BCP-MCNC: 3.9 G/DL (ref 3.2–4.9)
ALBUMIN/GLOB SERPL: 1.1 G/DL
ALP SERPL-CCNC: 195 U/L (ref 30–99)
ALT SERPL-CCNC: 21 U/L (ref 2–50)
ANION GAP SERPL CALC-SCNC: 7 MMOL/L (ref 0–11.9)
AST SERPL-CCNC: 30 U/L (ref 12–45)
BILIRUB SERPL-MCNC: 0.6 MG/DL (ref 0.1–1.5)
BUN SERPL-MCNC: 11 MG/DL (ref 8–22)
CALCIUM SERPL-MCNC: 9.3 MG/DL (ref 8.5–10.5)
CHLORIDE SERPL-SCNC: 107 MMOL/L (ref 96–112)
CHOLEST SERPL-MCNC: 153 MG/DL (ref 100–199)
CO2 SERPL-SCNC: 24 MMOL/L (ref 20–33)
CREAT SERPL-MCNC: 1.22 MG/DL (ref 0.5–1.4)
GLOBULIN SER CALC-MCNC: 3.5 G/DL (ref 1.9–3.5)
GLUCOSE SERPL-MCNC: 96 MG/DL (ref 65–99)
HDLC SERPL-MCNC: 30 MG/DL
LDLC SERPL CALC-MCNC: 102 MG/DL
POTASSIUM SERPL-SCNC: 4 MMOL/L (ref 3.6–5.5)
PROT SERPL-MCNC: 7.4 G/DL (ref 6–8.2)
SODIUM SERPL-SCNC: 138 MMOL/L (ref 135–145)
TRIGL SERPL-MCNC: 107 MG/DL (ref 0–149)

## 2018-04-19 PROCEDURE — 36415 COLL VENOUS BLD VENIPUNCTURE: CPT

## 2018-04-19 PROCEDURE — 80053 COMPREHEN METABOLIC PANEL: CPT

## 2018-04-19 PROCEDURE — 80061 LIPID PANEL: CPT

## 2018-04-20 ENCOUNTER — TELEPHONE (OUTPATIENT)
Dept: CARDIOLOGY | Facility: MEDICAL CENTER | Age: 78
End: 2018-04-20

## 2018-04-20 NOTE — TELEPHONE ENCOUNTER
----- Message from Vicki Jordan M.D. sent at 4/20/2018  8:43 AM PDT -----  Labs OK  Need f/u in the summer    ----- Message -----  From: Natividad Victoria R.N.  Sent: 4/19/2018   4:03 PM  To: Vicki Jordan M.D.    No FV, to ELLA DAVIS.

## 2018-06-01 ENCOUNTER — HOSPITAL ENCOUNTER (OUTPATIENT)
Dept: PAIN MANAGEMENT | Facility: REHABILITATION | Age: 78
End: 2018-06-01
Attending: PAIN MEDICINE
Payer: MEDICARE

## 2018-06-01 ENCOUNTER — HOSPITAL ENCOUNTER (OUTPATIENT)
Dept: RADIOLOGY | Facility: REHABILITATION | Age: 78
End: 2018-06-01
Attending: PAIN MEDICINE
Payer: MEDICARE

## 2018-06-01 VITALS
WEIGHT: 208.78 LBS | HEART RATE: 73 BPM | RESPIRATION RATE: 17 BRPM | DIASTOLIC BLOOD PRESSURE: 69 MMHG | SYSTOLIC BLOOD PRESSURE: 163 MMHG | TEMPERATURE: 97 F | HEIGHT: 72 IN | BODY MASS INDEX: 28.28 KG/M2 | OXYGEN SATURATION: 95 %

## 2018-06-01 PROCEDURE — 64494 INJ PARAVERT F JNT L/S 2 LEV: CPT

## 2018-06-01 PROCEDURE — 64493 INJ PARAVERT F JNT L/S 1 LEV: CPT

## 2018-06-01 PROCEDURE — 700117 HCHG RX CONTRAST REV CODE 255

## 2018-06-01 PROCEDURE — 700101 HCHG RX REV CODE 250

## 2018-06-01 PROCEDURE — 700111 HCHG RX REV CODE 636 W/ 250 OVERRIDE (IP)

## 2018-06-01 PROCEDURE — 64495 INJ PARAVERT F JNT L/S 3 LEV: CPT

## 2018-06-01 RX ORDER — LIDOCAINE HYDROCHLORIDE 10 MG/ML
INJECTION, SOLUTION EPIDURAL; INFILTRATION; INTRACAUDAL; PERINEURAL
Status: COMPLETED
Start: 2018-06-01 | End: 2018-06-01

## 2018-06-01 RX ORDER — BUPIVACAINE HYDROCHLORIDE 5 MG/ML
INJECTION, SOLUTION EPIDURAL; INTRACAUDAL
Status: COMPLETED
Start: 2018-06-01 | End: 2018-06-01

## 2018-06-01 RX ORDER — LIDOCAINE HYDROCHLORIDE 20 MG/ML
INJECTION, SOLUTION EPIDURAL; INFILTRATION; INTRACAUDAL; PERINEURAL
Status: COMPLETED
Start: 2018-06-01 | End: 2018-06-01

## 2018-06-01 RX ADMIN — LIDOCAINE HYDROCHLORIDE 5 ML: 10 INJECTION, SOLUTION EPIDURAL; INFILTRATION; INTRACAUDAL; PERINEURAL at 15:32

## 2018-06-01 RX ADMIN — LIDOCAINE HYDROCHLORIDE 5 ML: 20 INJECTION, SOLUTION EPIDURAL; INFILTRATION; INTRACAUDAL; PERINEURAL at 15:34

## 2018-06-01 RX ADMIN — IOHEXOL 3 ML: 240 INJECTION, SOLUTION INTRATHECAL; INTRAVASCULAR; INTRAVENOUS; ORAL at 15:33

## 2018-06-01 ASSESSMENT — PAIN SCALES - GENERAL
PAINLEVEL_OUTOF10: 5
PAINLEVEL_OUTOF10: 2

## 2018-06-01 NOTE — PROGRESS NOTES
Procedure:     Fluoroscopically Guided Local Anesthetic Blockade of the Lumbar Spinal Nerve Dorsal Rami Medial Branches  Level: Bilateral L4-5, L5-S1 facet joints  Diagnosis: Spondylosis, facet arthropathy     Description of Procedure:  The risks, benefits and alternatives of the procedure were reviewed and discussed with the patient.  Written informed consent was freely obtained. A pre-procedural time-out was conducted  by the physician verifying patient's identity, procedure to be performed, procedure site and side, and allergy verification. Appropriate equipment was determined to be in place for the procedure.     In the fluoroscopy suite the patient was placed in a prone position and the spine was prepped and draped in the usual sterile fashion.     Medial branch nerve target points were identified under fluoroscopy at the junction of the superior articular process and the transverse processes of L4, L5 and the first sacral superior articular process and the sacral ala bilaterally. The skin and subcutaneous tissues overlying these points were anesthetized using 1% lidocaine. Under intermittent fluoroscopic guidance a 22 gauge 3.5 inch spinal needle was atraumatically introduced and advanced to each target point. Following negative aspiration, 0.5  ml of contrast was injected confirming appropriate placement with no vascular uptake.     Next, following negative aspiration the following solution was injected in an incremental fashion at each target: 0.5  ml of 2% ldiocaine.     All needles were restilleted and withdrawn intact. The skin was cleaned and band-aid dressings were applied. The patient tolerated the procedure well and was discharged in good condition. There were no apparent complications.

## 2018-06-01 NOTE — PROGRESS NOTES
Medication reconciliation reviewed with patient. Denied taking any blood thinners and any  any anti- inflammatories medications. He had Aspirin 81 mg. this morning & Plavix had last night at 1900 PM.Dr. Zollinger made aware.Patient had a  (Tate/ ex - wife) .Home care form and verbal  instruction given to patient and verbalized understanding. Hand off reported to CITLALI Walls RN.

## 2018-06-01 NOTE — PROGRESS NOTES
Pt positioned prone by CST, CNA, RN and xray tech. Arms hanging off the head of the table, hands resting on stool under table . Pillow placed under feet and lower legs by CST.

## 2018-06-20 ENCOUNTER — APPOINTMENT (OUTPATIENT)
Dept: RADIOLOGY | Facility: MEDICAL CENTER | Age: 78
End: 2018-06-20
Attending: EMERGENCY MEDICINE
Payer: MEDICARE

## 2018-06-20 ENCOUNTER — HOSPITAL ENCOUNTER (EMERGENCY)
Facility: MEDICAL CENTER | Age: 78
End: 2018-06-20
Attending: EMERGENCY MEDICINE
Payer: MEDICARE

## 2018-06-20 VITALS
BODY MASS INDEX: 28.73 KG/M2 | HEIGHT: 72 IN | SYSTOLIC BLOOD PRESSURE: 133 MMHG | TEMPERATURE: 98.6 F | WEIGHT: 212.08 LBS | HEART RATE: 81 BPM | DIASTOLIC BLOOD PRESSURE: 73 MMHG | RESPIRATION RATE: 18 BRPM | OXYGEN SATURATION: 95 %

## 2018-06-20 DIAGNOSIS — R60.0 PEDAL EDEMA: Primary | ICD-10-CM

## 2018-06-20 DIAGNOSIS — F17.200 TOBACCO USE DISORDER: ICD-10-CM

## 2018-06-20 DIAGNOSIS — L03.115 CELLULITIS OF RIGHT LOWER EXTREMITY: ICD-10-CM

## 2018-06-20 DIAGNOSIS — R06.09 DYSPNEA ON EXERTION: Chronic | ICD-10-CM

## 2018-06-20 LAB
ALBUMIN SERPL BCP-MCNC: 3.7 G/DL (ref 3.2–4.9)
ALBUMIN/GLOB SERPL: 1.3 G/DL
ALP SERPL-CCNC: 153 U/L (ref 30–99)
ALT SERPL-CCNC: 11 U/L (ref 2–50)
ANION GAP SERPL CALC-SCNC: 9 MMOL/L (ref 0–11.9)
APPEARANCE UR: CLEAR
AST SERPL-CCNC: 14 U/L (ref 12–45)
BACTERIA #/AREA URNS HPF: NEGATIVE /HPF
BASOPHILS # BLD AUTO: 1.1 % (ref 0–1.8)
BASOPHILS # BLD: 0.1 K/UL (ref 0–0.12)
BILIRUB SERPL-MCNC: 0.5 MG/DL (ref 0.1–1.5)
BILIRUB UR QL STRIP.AUTO: NEGATIVE
BNP SERPL-MCNC: 227 PG/ML (ref 0–100)
BUN SERPL-MCNC: 10 MG/DL (ref 8–22)
CALCIUM SERPL-MCNC: 8.6 MG/DL (ref 8.5–10.5)
CHLORIDE SERPL-SCNC: 104 MMOL/L (ref 96–112)
CO2 SERPL-SCNC: 22 MMOL/L (ref 20–33)
COLOR UR: YELLOW
CREAT SERPL-MCNC: 1.15 MG/DL (ref 0.5–1.4)
EKG IMPRESSION: NORMAL
EOSINOPHIL # BLD AUTO: 0.25 K/UL (ref 0–0.51)
EOSINOPHIL NFR BLD: 2.7 % (ref 0–6.9)
EPI CELLS #/AREA URNS HPF: NEGATIVE /HPF
ERYTHROCYTE [DISTWIDTH] IN BLOOD BY AUTOMATED COUNT: 47.8 FL (ref 35.9–50)
GLOBULIN SER CALC-MCNC: 2.8 G/DL (ref 1.9–3.5)
GLUCOSE SERPL-MCNC: 104 MG/DL (ref 65–99)
GLUCOSE UR STRIP.AUTO-MCNC: NEGATIVE MG/DL
HCT VFR BLD AUTO: 40.4 % (ref 42–52)
HGB BLD-MCNC: 13.1 G/DL (ref 14–18)
HYALINE CASTS #/AREA URNS LPF: ABNORMAL /LPF
IMM GRANULOCYTES # BLD AUTO: 0.03 K/UL (ref 0–0.11)
IMM GRANULOCYTES NFR BLD AUTO: 0.3 % (ref 0–0.9)
KETONES UR STRIP.AUTO-MCNC: NEGATIVE MG/DL
LEUKOCYTE ESTERASE UR QL STRIP.AUTO: NEGATIVE
LYMPHOCYTES # BLD AUTO: 1.96 K/UL (ref 1–4.8)
LYMPHOCYTES NFR BLD: 21.4 % (ref 22–41)
MCH RBC QN AUTO: 27.8 PG (ref 27–33)
MCHC RBC AUTO-ENTMCNC: 32.4 G/DL (ref 33.7–35.3)
MCV RBC AUTO: 85.6 FL (ref 81.4–97.8)
MICRO URNS: ABNORMAL
MONOCYTES # BLD AUTO: 0.64 K/UL (ref 0–0.85)
MONOCYTES NFR BLD AUTO: 7 % (ref 0–13.4)
NEUTROPHILS # BLD AUTO: 6.16 K/UL (ref 1.82–7.42)
NEUTROPHILS NFR BLD: 67.5 % (ref 44–72)
NITRITE UR QL STRIP.AUTO: NEGATIVE
NRBC # BLD AUTO: 0 K/UL
NRBC BLD-RTO: 0 /100 WBC
PH UR STRIP.AUTO: 6.5 [PH]
PLATELET # BLD AUTO: 310 K/UL (ref 164–446)
PMV BLD AUTO: 8.7 FL (ref 9–12.9)
POTASSIUM SERPL-SCNC: 4.1 MMOL/L (ref 3.6–5.5)
PROT SERPL-MCNC: 6.5 G/DL (ref 6–8.2)
PROT UR QL STRIP: NEGATIVE MG/DL
RBC # BLD AUTO: 4.72 M/UL (ref 4.7–6.1)
RBC # URNS HPF: ABNORMAL /HPF
RBC UR QL AUTO: ABNORMAL
SODIUM SERPL-SCNC: 135 MMOL/L (ref 135–145)
SP GR UR STRIP.AUTO: 1.01
TROPONIN I SERPL-MCNC: <0.01 NG/ML (ref 0–0.04)
UROBILINOGEN UR STRIP.AUTO-MCNC: 1 MG/DL
WBC # BLD AUTO: 9.1 K/UL (ref 4.8–10.8)
WBC #/AREA URNS HPF: ABNORMAL /HPF

## 2018-06-20 PROCEDURE — 700101 HCHG RX REV CODE 250: Performed by: EMERGENCY MEDICINE

## 2018-06-20 PROCEDURE — 80053 COMPREHEN METABOLIC PANEL: CPT

## 2018-06-20 PROCEDURE — 84484 ASSAY OF TROPONIN QUANT: CPT

## 2018-06-20 PROCEDURE — 94640 AIRWAY INHALATION TREATMENT: CPT

## 2018-06-20 PROCEDURE — 93005 ELECTROCARDIOGRAM TRACING: CPT | Performed by: EMERGENCY MEDICINE

## 2018-06-20 PROCEDURE — 99285 EMERGENCY DEPT VISIT HI MDM: CPT

## 2018-06-20 PROCEDURE — 83880 ASSAY OF NATRIURETIC PEPTIDE: CPT

## 2018-06-20 PROCEDURE — 85025 COMPLETE CBC W/AUTO DIFF WBC: CPT

## 2018-06-20 PROCEDURE — 71045 X-RAY EXAM CHEST 1 VIEW: CPT

## 2018-06-20 PROCEDURE — 71275 CT ANGIOGRAPHY CHEST: CPT

## 2018-06-20 PROCEDURE — 94760 N-INVAS EAR/PLS OXIMETRY 1: CPT

## 2018-06-20 PROCEDURE — 93971 EXTREMITY STUDY: CPT

## 2018-06-20 PROCEDURE — 81001 URINALYSIS AUTO W/SCOPE: CPT

## 2018-06-20 RX ORDER — IPRATROPIUM BROMIDE AND ALBUTEROL SULFATE 2.5; .5 MG/3ML; MG/3ML
3 SOLUTION RESPIRATORY (INHALATION)
Status: DISCONTINUED | OUTPATIENT
Start: 2018-06-20 | End: 2018-06-20 | Stop reason: HOSPADM

## 2018-06-20 RX ORDER — AMOXICILLIN 500 MG/1
500 CAPSULE ORAL 3 TIMES DAILY
Qty: 21 CAP | Refills: 0 | Status: SHIPPED | OUTPATIENT
Start: 2018-06-20 | End: 2018-06-27

## 2018-06-20 RX ADMIN — IPRATROPIUM BROMIDE AND ALBUTEROL SULFATE 3 ML: .5; 3 SOLUTION RESPIRATORY (INHALATION) at 13:52

## 2018-06-20 ASSESSMENT — LIFESTYLE VARIABLES: DO YOU DRINK ALCOHOL: NO

## 2018-06-20 NOTE — ED NOTES
VSS. PIV DC 'd intact. Pt given DC instructions, including medication education, with verbalized understanding. Assisted to exit via WC. To take private car home

## 2018-06-20 NOTE — ED NOTES
Does the patient present to the ED because of a fall? NO    Is the patient 70 yrs or older? YES    Does the patient have an altered mental status? NO    Does the patient have impaired mobility? YES    Does the nurse feel the patient is a fall risk due to bowel/bladder incontinence, diarrhea, urinary frequency/urgency, leg weakness, orthostatic hypotension, dizziness/vertigo, or narcotic use? NO    Yes to ANY = High fall risk    Fall risk interventions implemented:     Move pt closer to nursing station  Familiarize pt with environment  Call light within reach and demonstrated use  Personal possessions within reach of pt  Stretcher in low position with wheels locked  Yellow socks and armband on pt  Green triangle on pt's door  Urinal given   Keep floor surfaces clean and dry  Keep care area uncluttered  Hourly assessment for pain, needs, position change, and call light access

## 2018-06-20 NOTE — ED PROVIDER NOTES
"ED Provider Note    Scribed for Connie Jimenez M.D. by Rex Garcia. 6/20/2018, 1:01 PM.    Primary care provider: FELIX Villa  Means of arrival: Walk in  History obtained from: Patient  History limited by: None    CHIEF COMPLAINT  Chief Complaint   Patient presents with   • Rash   • Leg Swelling       HPI  Galileo Torrez is a 77 y.o. male who presents to the Emergency Department complaining of a rash and swelling to his right lower leg onset 1 week ago. He does have chronic bilateral leg swelling but notes recent increased swelling to the right leg. Patient reports having blisters with some \"seeping\" to the area as well. These symptoms seem improved today. He does not report any significant alleviating factors to these symptoms. He also notes having chronic shortness of breath over the last 40 years which has been getting worse and is exacerbated after walking 20 feet. He has had oxygen in the past which has not helped him. He is going to see his pulmonologist tomorrow. Patient denies any cough or fevers.  Patient notes history of having a vein placed in the right leg to prevent BKA. He is a smoker.       REVIEW OF SYSTEMS  Pertinent positives include right leg with rash and blisters, right leg swelling, shortness of breath. Pertinent negatives include no cough, fevers. As above, all other systems reviewed and are negative.   See HPI for further details.   C    PAST MEDICAL HISTORY  Past Medical History:   Diagnosis Date   • Abnormality of gait    • Alcoholic polyneuropathy (HCC)    • Aortic valve disorders    • Atherosclerosis of renal artery (HCC)    • Blood clotting disorder (HCC) 10/15/15      left leg   • Bowel habit changes     constipation   • Breath shortness     says from smoking  No O2   • CAD (coronary artery disease) May 2009    LAD diffuse, 80% distal, %, OM1,%, Grafts all occluded LVEF 20%.  No targets for revascularization.   • Cancer (HCC)     skin   • " "Carotid artery stenosis August 2014    Carotid ultrasound with LICA 50-70% stenosis, YOLANDA <50% stenosis.  Unchanged from 2013.   • Chronic systolic heart failure ([facility])    • COPD    • Cough    • Dental disorder     dentures   • Dizziness    • Fall     Multiple falls   • Heart burn    • Hiatus hernia syndrome    • High cholesterol    • Hyperlipidemia    • Hypertension    • Insomnia, unspecified    • Lumbago    • Myocardial infarct ([facility]) 1995x2    5 stents   • Other specified forms of chronic ischemic heart disease    • Peptic ulcer, unspecified site, unspecified as acute or chronic, without mention of hemorrhage, perforation, or obstruction    • Peripheral vascular disease, unspecified ([facility]) February 2012    PCI/stent (Omnilink 7 x 28mm) to the right external iliac.   • Pure hypercholesterolemia    • Snoring    • Stroke ([facility]) 2011    \"I don't know when, somebody said I did. A little one\"   • Tobacco use disorder    • Transient alteration of awareness    • Umbilical hernia without mention of obstruction or gangrene    • Unspecified disorder of kidney and ureter    • Unspecified hemorrhagic conditions     blood thinners   • Unspecified hypertensive heart disease without heart failure    • Upper GI bleed        SURGICAL HISTORY  Past Surgical History:   Procedure Laterality Date   • FEMORAL POPLITEAL BYPASS Right 2/7/2018    Procedure: FEMORAL POPLITEAL BYPASS;  Surgeon: Lance Austin M.D.;  Location: SURGERY [facility];  Service: General   • MASS EXCISION GENERAL Left 6/8/2017    Procedure: MASS EXCISION GENERAL - SQUAMOUS CELL - EAR ;  Surgeon: Fuad Moreno M.D.;  Location: SURGERY SAME DAY [facility];  Service:    • FLAP CLOSURE Left 6/8/2017    Procedure: FLAP CLOSURE;  Surgeon: Fuad Moreno M.D.;  Location: SURGERY SAME DAY [facility];  Service:    • SPLIT THICKNESS SKIN GRAFT Left 8/25/2016    Procedure: SPLIT THICKNESS SKIN GRAFT LEG;  Surgeon: Fuad Moreno M.D.;  Location: SURGERY [facility];  " Service:    • STUMP REVISION Left 5/11/2016    Procedure: STUMP REVISION BK DEBRIDEMENT;  Surgeon: Lance Austin M.D.;  Location: [facility];  Service:    • KNEE AMPUTATION BELOW Left 12/22/2015    Procedure: KNEE AMPUTATION BELOW , REMOVAL OF LEFT TIBIAL BYPASS GRAFT;  Surgeon: Lance Austin M.D.;  Location: [facility];  Service:    • ANGIOGRAM Left 12/21/2015    Procedure: ANGIOGRAM, LEG;  Surgeon: Lance Austin M.D.;  Location: [facility];  Service:    • FEMORAL TIBIAL BYPASS Left 12/21/2015    Procedure: FEMORAL TIBIAL BYPASS with Distaflo Graft;  Surgeon: Lance Austin M.D.;  Location: [facility];  Service:    • FEMORAL POPLITEAL BYPASS Left 11/13/2015    Procedure: FEMORAL POPLITEAL ANGIOPLASTY cryo preserve vein placement  ;  Surgeon: Lance Austin M.D.;  Location: [facility];  Service:    • TOE AMPUTATION Left 11/9/2015    Procedure: TOE AMPUTATION FIRST AND SECOND;  Surgeon: Lance Austin M.D.;  Location: [facility];  Service:    • INCISION AND DRAINAGE GENERAL Left 11/9/2015    Procedure: INCISION AND DRAINAGE GENERAL WASHOUT, & WOUND VAC PLACEMENT LEG  ;  Surgeon: Lance Austin M.D.;  Location: [facility];  Service:    • FEMORAL POPLITEAL BYPASS Left 10/2/2015    Procedure: FEMORAL POPLITEAL BYPASS;  Surgeon: Lance Austin M.D.;  Location: [facility];  Service:    • ANGIOGRAM  10/2/2015    Procedure: ANGIOGRAM;  Surgeon: Lance Austin M.D.;  Location: [facility];  Service:    • RECOVERY  9/4/2015    Procedure: CATH LAB-[facility] W/COROS AND STENT [id number], ANGINA PECTORIS 413.9;  Surgeon: [facility];  [place] [facility] [place]  Service:    • THROMBECTOMY  3/24/2015    Performed by Lance Austin M.D. at [facility]   • ANGIOGRAM  3/24/2015    Performed by Lance Austin M.D. at [facility]   • GROIN EXPLORATION   3/11/2015    Performed by Lance Austin M.D. at SURGERY [facility]   • FEMORAL POPLITEAL BYPASS  2/11/2015    Performed by Lance Austin M.D. at SURGERY [facility]   • RECOVERY  1/15/2015    Performed by [facility] at SURGERY [facility]   • RECOVERY  2/28/2012    Performed by SURGERY, IR-RECOVERY at SURGERY SAME DAY [facility] ORS   • GASTROSCOPY-ENDO  3/3/2010    Performed by KALE JUAREZ at ENDOSCOPY [facility] ORS   • ENDOSTAT  3/3/2010    Performed by KALE JUAREZ at ENDOSCOPY [facility] ORS   • SCLEROTHERAPHY  3/3/2010    Performed by KALE JUAREZ at ENDOSCOPY [facility] ORS   • MULTIPLE CORONARY ARTERY BYPASS  1994     CABG x 3, in [place]   • OTHER  1984    LEFT ORBIT FX-SURGERY PIN/PLATE   • CARDIAC CATH, RIGHT/LEFT HEART      STENT       SOCIAL HISTORY  Social History   Substance Use Topics   • Smoking status: Current Every Day Smoker     Packs/day: 1.00     Years: 55.00     Types: Cigarettes   • Smokeless tobacco: Never Used   • Alcohol use No      History   Drug Use No       FAMILY HISTORY  Family History   Problem Relation Age of Onset   • Heart Disease Mother    • Other Mother      brain surg   • Cancer Brother      melanoma       CURRENT MEDICATIONS  Home Medications     Reviewed by Adalgisa Carrera R.N. (Registered Nurse) on 06/20/18 at 1231  Med List Status: Partial   Medication Last Dose Status   amitriptyline (ELAVIL) 150 MG Tab 2/9/2018 Active   aspirin EC (ECOTRIN) 81 MG Tablet Delayed Response 2/9/2018 Active   atorvastatin (LIPITOR) 80 MG tablet 2/9/2018 Active   clopidogrel (PLAVIX) 75 MG Tab 2/9/2018 Active   ezetimibe (ZETIA) 10 MG Tab 2/9/2018 Active   furosemide (LASIX) 20 MG Tab  Active   gabapentin (NEURONTIN) 300 MG Cap  Active   gabapentin (NEURONTIN) 300 MG Cap  Active   metoprolol (LOPRESSOR) 25 MG Tab 2/9/2018 Active   omeprazole (PRILOSEC) 20 MG delayed-release capsule 2/9/2018 Active   potassium chloride SA (KDUR) 20 MEQ Tab CR  Active  "  ranitidine (ZANTAC) 150 MG Tab 2/9/2018 Active                ALLERGIES  Allergies   Allergen Reactions   • Neosporin Pain Relieving Unspecified     Pt states \"I get blisters\".       PHYSICAL EXAM  VITAL SIGNS: /102   Pulse 86   Temp 37 °C (98.6 °F) (Temporal)   Resp 16   Ht 1.829 m (6')   Wt 96.2 kg (212 lb 1.3 oz)   SpO2 96%   BMI 28.76 kg/m²   Vitals reviewed.  Consitutional: Well-developed, well-nourished, disheveled. Negative for: distress.  HENT: Normocephalic, right external ear normal, left external ear normal, oropharynx clear and moist.  Eyes: Conjunctivae normal, extraocular movements normal. Negative for: discharge in right and left eye, icterus.  Neck: Range of motion normal, supple. Negative for cervical adenopathy.  Cardiovascular: Normal rate, regular rhythm, heart sounds normal, intact distal pulses. Negative for: murmur, rub, gallop.  Pulmonary/Chest Wall: Effort normal. Decreased breath sounds throughout, prolonged expiratory phase, expiratory wheezes. No inspiratory rales. Negative for: respiratory distress, rales, rhonchi.   Abdominal: Soft, bowel sounds normal. Negative for: distention, tenderness, rebound, guarding.  Musculoskeletal: Normal range of motion. Right lower extremity with 2+ pitting edema to mid thigh. Left BKA prosthesis in place.   Neurological: Alert and oriented x3. No focal deficits.  Skin: Warm, dry. Circumferential erythematous pustular lesions excoriated to the right lower extremity  Psych: Mood/affect normal, behavior normal, judgment normal.    DIAGNOSTIC STUDIES / PROCEDURES    LABS  Results for orders placed or performed during the hospital encounter of 06/20/18   CBC WITH DIFFERENTIAL   Result Value Ref Range    WBC 9.1 4.8 - 10.8 K/uL    RBC 4.72 4.70 - 6.10 M/uL    Hemoglobin 13.1 (L) 14.0 - 18.0 g/dL    Hematocrit 40.4 (L) 42.0 - 52.0 %    MCV 85.6 81.4 - 97.8 fL    MCH 27.8 27.0 - 33.0 pg    MCHC 32.4 (L) 33.7 - 35.3 g/dL    RDW 47.8 35.9 - 50.0 fL "    Platelet Count 310 164 - 446 K/uL    MPV 8.7 (L) 9.0 - 12.9 fL    Neutrophils-Polys 67.50 44.00 - 72.00 %    Lymphocytes 21.40 (L) 22.00 - 41.00 %    Monocytes 7.00 0.00 - 13.40 %    Eosinophils 2.70 0.00 - 6.90 %    Basophils 1.10 0.00 - 1.80 %    Immature Granulocytes 0.30 0.00 - 0.90 %    Nucleated RBC 0.00 /100 WBC    Neutrophils (Absolute) 6.16 1.82 - 7.42 K/uL    Lymphs (Absolute) 1.96 1.00 - 4.80 K/uL    Monos (Absolute) 0.64 0.00 - 0.85 K/uL    Eos (Absolute) 0.25 0.00 - 0.51 K/uL    Baso (Absolute) 0.10 0.00 - 0.12 K/uL    Immature Granulocytes (abs) 0.03 0.00 - 0.11 K/uL    NRBC (Absolute) 0.00 K/uL   COMP METABOLIC PANEL   Result Value Ref Range    Sodium 135 135 - 145 mmol/L    Potassium 4.1 3.6 - 5.5 mmol/L    Chloride 104 96 - 112 mmol/L    Co2 22 20 - 33 mmol/L    Anion Gap 9.0 0.0 - 11.9    Glucose 104 (H) 65 - 99 mg/dL    Bun 10 8 - 22 mg/dL    Creatinine 1.15 0.50 - 1.40 mg/dL    Calcium 8.6 8.5 - 10.5 mg/dL    AST(SGOT) 14 12 - 45 U/L    ALT(SGPT) 11 2 - 50 U/L    Alkaline Phosphatase 153 (H) 30 - 99 U/L    Total Bilirubin 0.5 0.1 - 1.5 mg/dL    Albumin 3.7 3.2 - 4.9 g/dL    Total Protein 6.5 6.0 - 8.2 g/dL    Globulin 2.8 1.9 - 3.5 g/dL    A-G Ratio 1.3 g/dL   TROPONIN   Result Value Ref Range    Troponin I <0.01 0.00 - 0.04 ng/mL   BTYPE NATRIURETIC PEPTIDE   Result Value Ref Range    B Natriuretic Peptide 227 (H) 0 - 100 pg/mL   URINALYSIS (UA)   Result Value Ref Range    Color Yellow     Character Clear     Specific Gravity 1.010 <1.035    Ph 6.5 5.0 - 8.0    Glucose Negative Negative mg/dL    Ketones Negative Negative mg/dL    Protein Negative Negative mg/dL    Bilirubin Negative Negative    Urobilinogen, Urine 1.0 Negative    Nitrite Negative Negative    Leukocyte Esterase Negative Negative    Occult Blood Trace (A) Negative    Micro Urine Req Microscopic    ESTIMATED GFR   Result Value Ref Range    GFR If African American >60 >60 mL/min/1.73 m 2    GFR If Non  >60 >60  mL/min/1.73 m 2   URINE MICROSCOPIC (W/UA)   Result Value Ref Range    WBC 0-2 (A) /hpf    RBC 10-20 (A) /hpf    Bacteria Negative None /hpf    Epithelial Cells Negative /hpf    Hyaline Cast 0-2 /lpf   EKG (NOW)   Result Value Ref Range    Report       [facility]    Test Date:  2018  Pt Name:    KALE CHAN             Department: ER  MRN:        [id number]                      Room:       [facility]  Gender:     Male                         Technician: [id number]  :        [id number]                   Requested By:TK LOVE  Order #:    [id number]                    Reading MD: TK LOVE MD    Measurements  Intervals                                Axis  Rate:       79                           P:          39  AR:         284                          QRS:        251  QRSD:       134                          T:          12  QT:         452  QTc:        519    Interpretive Statements  SINUS RHYTHM a t79  FIRST DEGREE AV BLOCK with AR interval of 284  Poor R-wave progression  NONSPECIFIC IVCD WITH LAD  No ST or T-wave changes to indicate ischemia or infarct  Compared to ECG 2018 10:28:52  No significant changes    Electronically Signed On 2018 15:32:19 PDT by TK LOVE MD        All labs reviewed by me.    EKG Interpretation:  12-lead EKG by my interpretation is as above    RADIOLOGY  LE VENOUS DUPLEX (Specify in Comments Left, Right Or Bilateral)   Final Result      CT-CTA CHEST PULMONARY ARTERY W/ RECONS   Final Result      1.  There is no CT evidence of acute pulmonary embolism.   2.  Diffuse bilateral subpleural linear opacities which could represent some degree of interstitial edema although could represent underlying chronic interstitial lung disease.   3.  There is a nonspecific 5 mm noncalcified peripheral right lower lobe lung nodule, unchanged since  and likely benign.   4.  There is cholelithiasis without biliary dilatation or acute  inflammation.   5.  Bilateral nonobstructive renal calculi are again seen.            DX-CHEST-PORTABLE (1 VIEW)   Final Result      1.  No evidence of acute cardiopulmonary process.   2.  No change in mild enlargement of the cardiac silhouette.        The radiologist's interpretation of all radiological studies have been reviewed by me.    COURSE & MEDICAL DECISION MAKING  Nursing notes, VS, PMSFHx reviewed in chart.    1:01 PM Patient seen and examined at bedside. The patient presents with exacerbation of chronic shortness of breath with swelling and redness to the right lower extremity and the differential diagnosis includes but is not limited to infection, clot, CHF, renal disorder, liver disorder. Discussed plan of care which includes labs and imaging to evaluate for DVT. Patient understands and agrees to plan. Ordered CT-CTA chest pulmonary artery, DX chest, LE venous duplex, estimated GFR, CBC, CMP, troponin, BNP, UA, EKG. Patient will be treated with duoneb nebulizer solution for his symptoms.      1:49 PM Reviewed ultrasound results as seen above which shows no DVT.    4:30 PM Patient reevaluated at bedside. Discussed lab and radiology results as seen above. No evidence of DVT or PE. The patient will be discharged with instructions regarding supportive care and medications amoxil. Instructions were given for follow-up with his pulmonologist to discuss portable oxygen. Discussed indications for seeking immediate medical attention. Patient was given the opportunity for questions. The patient understands and agrees.     Discharge vitals: /73   Pulse 81   Temp 37 °C (98.6 °F) (Temporal)   Resp 18   Ht 1.829 m (6')   Wt 96.2 kg (212 lb 1.3 oz)   SpO2 95%   BMI 28.76 kg/m²        The patient will return for new or worsening symptoms and is stable at the time of discharge.    The patient is referred to a primary physician for blood pressure management, diabetic screening, and for all other  preventative health concerns.      DISPOSITION:  Patient will be discharged home in stable condition.    FOLLOW UP:  your pulmonologist      tomorrow as previously scheduled      OUTPATIENT MEDICATIONS:  New Prescriptions    AMOXICILLIN (AMOXIL) 500 MG CAP    Take 1 Cap by mouth 3 times a day for 7 days.        FINAL IMPRESSION  1. Pedal edema    2. Dyspnea on exertion    3. Cellulitis of right lower extremity    4. Tobacco use disorder          Rex BLISS (Scribe), am scribing for, and in the presence of, Connie Jimenez M.D..    Electronically signed by: Rex Garcia (Scribe), 6/20/2018    Connie BLISS M.D. personally performed the services described in this documentation, as scribed by Rex Garcia in my presence, and it is both accurate and complete.    The note accurately reflects work and decisions made by me.  Connie Jimenez  6/20/2018  6:14 PM

## 2018-06-20 NOTE — ED TRIAGE NOTES
Pt to triage by wheelchair. Pt c/o left leg swelling, redness, and rash. Denies pain. Sx began 10 days ago.     Chief Complaint   Patient presents with   • Rash   • Leg Swelling     Pt placed in lobby, updated on triage process. Pt educated to notified RN or triage tech if changes in condition.

## 2018-06-20 NOTE — ED NOTES
Pt resting in stretcher. Resp even and unlabored. NAD. Awaiting CT. Fall precautions in place. Call bell in reach. Ongoing monitoring.

## 2018-06-20 NOTE — ED NOTES
Assumed care of pt from waiting room. Pt to room via WC.  Changed to gown. Pt swelling and dimpling noted to RLE. LLE w MINH. Pt denies pain. NAD. Fall precautions in place. Call bell in reach.Awaiting MD eval/orders. Ongoing monitoring.

## 2018-06-21 ENCOUNTER — PATIENT OUTREACH (OUTPATIENT)
Dept: HEALTH INFORMATION MANAGEMENT | Facility: OTHER | Age: 78
End: 2018-06-21

## 2018-06-30 ENCOUNTER — APPOINTMENT (OUTPATIENT)
Dept: RADIOLOGY | Facility: MEDICAL CENTER | Age: 78
DRG: 853 | End: 2018-06-30
Attending: INTERNAL MEDICINE
Payer: MEDICARE

## 2018-06-30 ENCOUNTER — APPOINTMENT (OUTPATIENT)
Dept: RADIOLOGY | Facility: MEDICAL CENTER | Age: 78
DRG: 853 | End: 2018-06-30
Attending: EMERGENCY MEDICINE
Payer: MEDICARE

## 2018-06-30 ENCOUNTER — HOSPITAL ENCOUNTER (INPATIENT)
Facility: MEDICAL CENTER | Age: 78
LOS: 10 days | DRG: 853 | End: 2018-07-10
Attending: EMERGENCY MEDICINE | Admitting: INTERNAL MEDICINE
Payer: MEDICARE

## 2018-06-30 DIAGNOSIS — I25.10 CORONARY ARTERY DISEASE INVOLVING NATIVE CORONARY ARTERY OF NATIVE HEART WITHOUT ANGINA PECTORIS: ICD-10-CM

## 2018-06-30 DIAGNOSIS — L08.9 WOUND INFECTION: ICD-10-CM

## 2018-06-30 DIAGNOSIS — T14.8XXA WOUND INFECTION: ICD-10-CM

## 2018-06-30 DIAGNOSIS — L02.419 CELLULITIS AND ABSCESS OF LEG, EXCEPT FOOT: ICD-10-CM

## 2018-06-30 DIAGNOSIS — L03.119 CELLULITIS AND ABSCESS OF LEG, EXCEPT FOOT: ICD-10-CM

## 2018-06-30 DIAGNOSIS — T82.7XXA VASCULAR GRAFT INFECTION, INITIAL ENCOUNTER (HCC): ICD-10-CM

## 2018-06-30 DIAGNOSIS — L03.115 CELLULITIS OF RIGHT LOWER EXTREMITY: ICD-10-CM

## 2018-06-30 LAB
ALBUMIN SERPL BCP-MCNC: 3.7 G/DL (ref 3.2–4.9)
ALBUMIN/GLOB SERPL: 1 G/DL
ALP SERPL-CCNC: 162 U/L (ref 30–99)
ALT SERPL-CCNC: 28 U/L (ref 2–50)
ANION GAP SERPL CALC-SCNC: 8 MMOL/L (ref 0–11.9)
APTT PPP: 28.4 SEC (ref 24.7–36)
APTT PPP: 30.5 SEC (ref 24.7–36)
AST SERPL-CCNC: 54 U/L (ref 12–45)
BASOPHILS # BLD AUTO: 0.7 % (ref 0–1.8)
BASOPHILS # BLD: 0.1 K/UL (ref 0–0.12)
BILIRUB SERPL-MCNC: 0.8 MG/DL (ref 0.1–1.5)
BNP SERPL-MCNC: 86 PG/ML (ref 0–100)
BUN SERPL-MCNC: 9 MG/DL (ref 8–22)
CALCIUM SERPL-MCNC: 8.9 MG/DL (ref 8.5–10.5)
CHLORIDE SERPL-SCNC: 103 MMOL/L (ref 96–112)
CO2 SERPL-SCNC: 27 MMOL/L (ref 20–33)
CREAT SERPL-MCNC: 1 MG/DL (ref 0.5–1.4)
CRP SERPL HS-MCNC: 5.45 MG/DL (ref 0–0.75)
EOSINOPHIL # BLD AUTO: 0.21 K/UL (ref 0–0.51)
EOSINOPHIL NFR BLD: 1.4 % (ref 0–6.9)
ERYTHROCYTE [DISTWIDTH] IN BLOOD BY AUTOMATED COUNT: 47 FL (ref 35.9–50)
ERYTHROCYTE [SEDIMENTATION RATE] IN BLOOD BY WESTERGREN METHOD: 31 MM/HOUR (ref 0–20)
GLOBULIN SER CALC-MCNC: 3.8 G/DL (ref 1.9–3.5)
GLUCOSE SERPL-MCNC: 106 MG/DL (ref 65–99)
HCT VFR BLD AUTO: 42.5 % (ref 42–52)
HGB BLD-MCNC: 14 G/DL (ref 14–18)
IMM GRANULOCYTES # BLD AUTO: 0.05 K/UL (ref 0–0.11)
IMM GRANULOCYTES NFR BLD AUTO: 0.3 % (ref 0–0.9)
INR PPP: 1.09 (ref 0.87–1.13)
INR PPP: 1.13 (ref 0.87–1.13)
LACTATE BLD-SCNC: 1.5 MMOL/L (ref 0.5–2)
LYMPHOCYTES # BLD AUTO: 1.52 K/UL (ref 1–4.8)
LYMPHOCYTES NFR BLD: 9.9 % (ref 22–41)
MCH RBC QN AUTO: 27.6 PG (ref 27–33)
MCHC RBC AUTO-ENTMCNC: 32.9 G/DL (ref 33.7–35.3)
MCV RBC AUTO: 83.8 FL (ref 81.4–97.8)
MONOCYTES # BLD AUTO: 0.82 K/UL (ref 0–0.85)
MONOCYTES NFR BLD AUTO: 5.3 % (ref 0–13.4)
NEUTROPHILS # BLD AUTO: 12.67 K/UL (ref 1.82–7.42)
NEUTROPHILS NFR BLD: 82.4 % (ref 44–72)
NRBC # BLD AUTO: 0 K/UL
NRBC BLD-RTO: 0 /100 WBC
PLATELET # BLD AUTO: 308 K/UL (ref 164–446)
PMV BLD AUTO: 8.7 FL (ref 9–12.9)
POTASSIUM SERPL-SCNC: 3.8 MMOL/L (ref 3.6–5.5)
PREALB SERPL-MCNC: 12 MG/DL (ref 18–38)
PROT SERPL-MCNC: 7.5 G/DL (ref 6–8.2)
PROTHROMBIN TIME: 13.8 SEC (ref 12–14.6)
PROTHROMBIN TIME: 14.2 SEC (ref 12–14.6)
RBC # BLD AUTO: 5.07 M/UL (ref 4.7–6.1)
SODIUM SERPL-SCNC: 138 MMOL/L (ref 135–145)
WBC # BLD AUTO: 15.4 K/UL (ref 4.8–10.8)

## 2018-06-30 PROCEDURE — 96367 TX/PROPH/DG ADDL SEQ IV INF: CPT

## 2018-06-30 PROCEDURE — 700111 HCHG RX REV CODE 636 W/ 250 OVERRIDE (IP): Performed by: INTERNAL MEDICINE

## 2018-06-30 PROCEDURE — 770006 HCHG ROOM/CARE - MED/SURG/GYN SEMI*

## 2018-06-30 PROCEDURE — 99221 1ST HOSP IP/OBS SF/LOW 40: CPT | Mod: AI | Performed by: INTERNAL MEDICINE

## 2018-06-30 PROCEDURE — 93010 ELECTROCARDIOGRAM REPORT: CPT | Performed by: INTERNAL MEDICINE

## 2018-06-30 PROCEDURE — 304561 HCHG STAT O2

## 2018-06-30 PROCEDURE — 86140 C-REACTIVE PROTEIN: CPT

## 2018-06-30 PROCEDURE — 71045 X-RAY EXAM CHEST 1 VIEW: CPT

## 2018-06-30 PROCEDURE — 700105 HCHG RX REV CODE 258: Performed by: INTERNAL MEDICINE

## 2018-06-30 PROCEDURE — 85730 THROMBOPLASTIN TIME PARTIAL: CPT

## 2018-06-30 PROCEDURE — 80053 COMPREHEN METABOLIC PANEL: CPT

## 2018-06-30 PROCEDURE — 73630 X-RAY EXAM OF FOOT: CPT | Mod: RT

## 2018-06-30 PROCEDURE — 700102 HCHG RX REV CODE 250 W/ 637 OVERRIDE(OP): Performed by: INTERNAL MEDICINE

## 2018-06-30 PROCEDURE — 85025 COMPLETE CBC W/AUTO DIFF WBC: CPT

## 2018-06-30 PROCEDURE — 83605 ASSAY OF LACTIC ACID: CPT

## 2018-06-30 PROCEDURE — 85610 PROTHROMBIN TIME: CPT

## 2018-06-30 PROCEDURE — 70450 CT HEAD/BRAIN W/O DYE: CPT

## 2018-06-30 PROCEDURE — 700111 HCHG RX REV CODE 636 W/ 250 OVERRIDE (IP): Performed by: EMERGENCY MEDICINE

## 2018-06-30 PROCEDURE — 700105 HCHG RX REV CODE 258: Performed by: EMERGENCY MEDICINE

## 2018-06-30 PROCEDURE — 85652 RBC SED RATE AUTOMATED: CPT

## 2018-06-30 PROCEDURE — 36415 COLL VENOUS BLD VENIPUNCTURE: CPT

## 2018-06-30 PROCEDURE — A9270 NON-COVERED ITEM OR SERVICE: HCPCS | Performed by: INTERNAL MEDICINE

## 2018-06-30 PROCEDURE — 96365 THER/PROPH/DIAG IV INF INIT: CPT

## 2018-06-30 PROCEDURE — 96366 THER/PROPH/DIAG IV INF ADDON: CPT

## 2018-06-30 PROCEDURE — 83880 ASSAY OF NATRIURETIC PEPTIDE: CPT

## 2018-06-30 PROCEDURE — 87040 BLOOD CULTURE FOR BACTERIA: CPT | Mod: 91

## 2018-06-30 PROCEDURE — 93005 ELECTROCARDIOGRAM TRACING: CPT | Performed by: INTERNAL MEDICINE

## 2018-06-30 PROCEDURE — 99285 EMERGENCY DEPT VISIT HI MDM: CPT

## 2018-06-30 PROCEDURE — 84134 ASSAY OF PREALBUMIN: CPT

## 2018-06-30 RX ORDER — BISACODYL 10 MG
10 SUPPOSITORY, RECTAL RECTAL
Status: DISCONTINUED | OUTPATIENT
Start: 2018-06-30 | End: 2018-07-10 | Stop reason: HOSPADM

## 2018-06-30 RX ORDER — ATORVASTATIN CALCIUM 40 MG/1
80 TABLET, FILM COATED ORAL EVERY EVENING
Status: DISCONTINUED | OUTPATIENT
Start: 2018-06-30 | End: 2018-07-10 | Stop reason: HOSPADM

## 2018-06-30 RX ORDER — AMITRIPTYLINE HYDROCHLORIDE 150 MG/1
300 TABLET ORAL
Status: DISCONTINUED | OUTPATIENT
Start: 2018-06-30 | End: 2018-07-10 | Stop reason: HOSPADM

## 2018-06-30 RX ORDER — ONDANSETRON 2 MG/ML
4 INJECTION INTRAMUSCULAR; INTRAVENOUS EVERY 4 HOURS PRN
Status: DISCONTINUED | OUTPATIENT
Start: 2018-06-30 | End: 2018-06-30

## 2018-06-30 RX ORDER — ONDANSETRON 4 MG/1
4 TABLET, ORALLY DISINTEGRATING ORAL EVERY 4 HOURS PRN
Status: DISCONTINUED | OUTPATIENT
Start: 2018-06-30 | End: 2018-06-30

## 2018-06-30 RX ORDER — SODIUM CHLORIDE 9 MG/ML
30 INJECTION, SOLUTION INTRAVENOUS
Status: ACTIVE | OUTPATIENT
Start: 2018-06-30 | End: 2018-07-01

## 2018-06-30 RX ORDER — SODIUM CHLORIDE 9 MG/ML
1000 INJECTION, SOLUTION INTRAVENOUS
Status: ACTIVE | OUTPATIENT
Start: 2018-06-30 | End: 2018-07-01

## 2018-06-30 RX ORDER — POLYETHYLENE GLYCOL 3350 17 G/17G
1 POWDER, FOR SOLUTION ORAL
Status: DISCONTINUED | OUTPATIENT
Start: 2018-06-30 | End: 2018-07-10 | Stop reason: HOSPADM

## 2018-06-30 RX ORDER — SODIUM CHLORIDE 9 MG/ML
INJECTION, SOLUTION INTRAVENOUS CONTINUOUS
Status: DISCONTINUED | OUTPATIENT
Start: 2018-06-30 | End: 2018-07-08 | Stop reason: SDDI

## 2018-06-30 RX ORDER — ACETAMINOPHEN 325 MG/1
650 TABLET ORAL EVERY 6 HOURS PRN
Status: DISCONTINUED | OUTPATIENT
Start: 2018-06-30 | End: 2018-07-10 | Stop reason: HOSPADM

## 2018-06-30 RX ORDER — LABETALOL HYDROCHLORIDE 5 MG/ML
10 INJECTION, SOLUTION INTRAVENOUS EVERY 4 HOURS PRN
Status: DISCONTINUED | OUTPATIENT
Start: 2018-06-30 | End: 2018-07-10 | Stop reason: HOSPADM

## 2018-06-30 RX ORDER — METOPROLOL TARTRATE 50 MG/1
50 TABLET, FILM COATED ORAL 2 TIMES DAILY
Status: DISCONTINUED | OUTPATIENT
Start: 2018-06-30 | End: 2018-07-05

## 2018-06-30 RX ORDER — GABAPENTIN 300 MG/1
600 CAPSULE ORAL EVERY EVENING
Status: DISCONTINUED | OUTPATIENT
Start: 2018-06-30 | End: 2018-07-10 | Stop reason: HOSPADM

## 2018-06-30 RX ORDER — OMEPRAZOLE 20 MG/1
20 CAPSULE, DELAYED RELEASE ORAL DAILY
Status: DISCONTINUED | OUTPATIENT
Start: 2018-06-30 | End: 2018-07-10 | Stop reason: HOSPADM

## 2018-06-30 RX ORDER — RANITIDINE 150 MG/1
150 TABLET ORAL 2 TIMES DAILY
Status: DISCONTINUED | OUTPATIENT
Start: 2018-06-30 | End: 2018-06-30

## 2018-06-30 RX ORDER — CLOPIDOGREL BISULFATE 75 MG/1
75 TABLET ORAL EVERY EVENING
Status: DISCONTINUED | OUTPATIENT
Start: 2018-06-30 | End: 2018-07-10 | Stop reason: HOSPADM

## 2018-06-30 RX ORDER — AMOXICILLIN 250 MG
2 CAPSULE ORAL 2 TIMES DAILY
Status: DISCONTINUED | OUTPATIENT
Start: 2018-06-30 | End: 2018-07-10 | Stop reason: HOSPADM

## 2018-06-30 RX ADMIN — GABAPENTIN 600 MG: 300 CAPSULE ORAL at 19:59

## 2018-06-30 RX ADMIN — SODIUM CHLORIDE: 9 INJECTION, SOLUTION INTRAVENOUS at 17:35

## 2018-06-30 RX ADMIN — ATORVASTATIN CALCIUM 80 MG: 40 TABLET, FILM COATED ORAL at 19:59

## 2018-06-30 RX ADMIN — AMPICILLIN SODIUM AND SULBACTAM SODIUM 3 G: 2; 1 INJECTION, POWDER, FOR SOLUTION INTRAMUSCULAR; INTRAVENOUS at 12:55

## 2018-06-30 RX ADMIN — AMPICILLIN SODIUM AND SULBACTAM SODIUM 3 G: 2; 1 INJECTION, POWDER, FOR SOLUTION INTRAMUSCULAR; INTRAVENOUS at 18:29

## 2018-06-30 RX ADMIN — ASPIRIN 81 MG: 81 TABLET, COATED ORAL at 16:47

## 2018-06-30 RX ADMIN — VANCOMYCIN HYDROCHLORIDE 2300 MG: 100 INJECTION, POWDER, LYOPHILIZED, FOR SOLUTION INTRAVENOUS at 13:41

## 2018-06-30 RX ADMIN — METOPROLOL TARTRATE 50 MG: 50 TABLET ORAL at 20:00

## 2018-06-30 RX ADMIN — CLOPIDOGREL 75 MG: 75 TABLET, FILM COATED ORAL at 19:58

## 2018-06-30 RX ADMIN — OMEPRAZOLE 20 MG: 20 CAPSULE, DELAYED RELEASE ORAL at 16:47

## 2018-06-30 RX ADMIN — AMITRIPTYLINE HYDROCHLORIDE 300 MG: 150 TABLET, FILM COATED ORAL at 19:59

## 2018-06-30 ASSESSMENT — PAIN SCALES - GENERAL
PAINLEVEL_OUTOF10: 6
PAINLEVEL_OUTOF10: 10
PAINLEVEL_OUTOF10: 0
PAINLEVEL_OUTOF10: 0

## 2018-06-30 ASSESSMENT — PATIENT HEALTH QUESTIONNAIRE - PHQ9
SUM OF ALL RESPONSES TO PHQ9 QUESTIONS 1 AND 2: 0
1. LITTLE INTEREST OR PLEASURE IN DOING THINGS: NOT AT ALL
SUM OF ALL RESPONSES TO PHQ9 QUESTIONS 1 AND 2: 0
2. FEELING DOWN, DEPRESSED, IRRITABLE, OR HOPELESS: NOT AT ALL
1. LITTLE INTEREST OR PLEASURE IN DOING THINGS: NOT AT ALL
2. FEELING DOWN, DEPRESSED, IRRITABLE, OR HOPELESS: NOT AT ALL

## 2018-06-30 ASSESSMENT — LIFESTYLE VARIABLES
EVER_SMOKED: YES
ALCOHOL_USE: NO

## 2018-06-30 NOTE — H&P
Hospital Medicine History & Physical Note    Date of Service  6/30/2018    Primary Care Physician  BERNADETTE Villa.    Consultants  LPS    Code Status  Full    Chief Complaint  Pain    History of Presenting Illness  77 y.o. male who presented 6/30/2018 with pain in multiple spots including left arm and leg as well as buttock pain.  At the time of exam patient is somnolent and nonverbal, information obtained from the chart.  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  Now he has left arm, chest, buttock cellulitis.    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[facility]); Aortic valve disorders; Atherosclerosis of renal artery ([facility]); Blood clotting disorder ([facility]) (10/15/15); Bowel habit changes; Breath shortness; CAD (coronary artery disease) (May 2009); Cancer ([facility]); Carotid artery stenosis (August 2014); Chronic systolic heart failure ([facility]); COPD; Cough; Dental disorder; Dizziness; Fall; Heart burn; Hiatus hernia syndrome; High cholesterol; Hyperlipidemia; Hypertension; Insomnia, unspecified; Lumbago; Myocardial infarct ([facility]) (1995x2); Other specified forms of chronic ischemic heart disease; Peptic ulcer, unspecified site, unspecified as acute or chronic, without mention of hemorrhage, perforation, or obstruction; Peripheral vascular disease, unspecified ([facility]) (February 2012); Pure hypercholesterolemia; Snoring; Stroke ([facility]) (2011); Tobacco use disorder; Transient alteration of awareness; Umbilical hernia without mention of obstruction or gangrene; Unspecified disorder of kidney and ureter; Unspecified hemorrhagic conditions; 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    Surgical History   has a past  "surgical history that includes gastroscopy-endo (3/3/2010); endostat (3/3/2010); sclerotheraphy (3/3/2010); cardiac cath, right/left heart; multiple coronary artery bypass (1994); recovery (2/28/2012); recovery (1/15/2015); femoral popliteal bypass (2/11/2015); other (1984); groin exploration (3/11/2015); thrombectomy (3/24/2015); angiogram (3/24/2015); recovery (9/4/2015); femoral popliteal bypass (Left, 10/2/2015); angiogram (10/2/2015); toe amputation (Left, 11/9/2015); incision and drainage general (Left, 11/9/2015); femoral popliteal bypass (Left, 11/13/2015); angiogram (Left, 12/21/2015); femoral tibial bypass (Left, 12/21/2015); knee amputation below (Left, 12/22/2015); stump revision (Left, 5/11/2016); split thickness skin graft (Left, 8/25/2016); mass excision general (Left, 6/8/2017); flap closure (Left, 6/8/2017); and femoral popliteal bypass (Right, 2/7/2018).     Family History  family history includes Cancer in his brother; Heart Disease in his mother; Other in his mother.     Social History   reports that he has been smoking Cigarettes.  He has a 55.00 pack-year smoking history. He has never used smokeless tobacco. He reports that he does not drink alcohol or use drugs.    Allergies  Allergies   Allergen Reactions   • Neosporin Pain Relieving Unspecified     Pt states \"I get blisters\".       Medications  Prior to Admission Medications   Prescriptions Last Dose Informant Patient Reported? Taking?   amitriptyline (ELAVIL) 150 MG Tab 2/9/2018 at pm Patient No No   Sig: Take 2 Tabs by mouth every bedtime.   aspirin EC (ECOTRIN) 81 MG Tablet Delayed Response 2/9/2018 at am Patient Yes No   Sig: Take 81 mg by mouth every morning. Indications: Heart Attack   atorvastatin (LIPITOR) 80 MG tablet 2/9/2018 at pm Patient No No   Sig: Take 1 Tab by mouth every evening.   clopidogrel (PLAVIX) 75 MG Tab 2/9/2018 at pm Patient Yes No   Sig: Take 75 mg by mouth every evening.   ezetimibe (ZETIA) 10 MG Tab 2/9/2018 at " pm Patient Yes No   Sig: Take 10 mg by mouth every evening.   furosemide (LASIX) 20 MG Tab   No No   Sig: Take 1 Tab by mouth every day.   gabapentin (NEURONTIN) 300 MG Cap   No No   Sig: TAKE 2 CAPSULES BY MOUTH EVERY NIGHT AT BEDTIME   gabapentin (NEURONTIN) 300 MG Cap   No No   Sig: TAKE 2 CAPSULES BY MOUTH EVERY NIGHT AT BEDTIME   metoprolol (LOPRESSOR) 25 MG Tab 2/9/2018 at pm Patient No No   Sig: Take 2 Tabs by mouth 2 times a day.   omeprazole (PRILOSEC) 20 MG delayed-release capsule 2/9/2018 at am Patient No No   Sig: Take 1 Cap by mouth every day.   potassium chloride SA (KDUR) 20 MEQ Tab CR   No No   Sig: Take 1 Tab by mouth every day.   ranitidine (ZANTAC) 150 MG Tab 2/9/2018 at pm Patient No No   Sig: Take 1 Tab by mouth 2 times a day.      Facility-Administered Medications: None       Physical Exam  Blood Pressure : 140/65   Temperature: 36.2 °C (97.2 °F)   Pulse: 100   Respiration: (!) 93   Pulse Oximetry: 93 %     Physical Exam   Constitutional:  Non-toxic appearance. He appears ill. No distress.   HENT:   Head: Normocephalic and atraumatic.   Mouth/Throat: No oropharyngeal exudate.   Eyes: Right eye exhibits no discharge. Left eye exhibits no discharge.   Neck: Neck supple. No tracheal deviation, no edema and no erythema present.   Cardiovascular: Normal rate and regular rhythm.  Exam reveals no gallop and no friction rub.    No murmur heard.  Pulmonary/Chest: Effort normal and breath sounds normal. No stridor. No respiratory distress. He has no wheezes. He has no rales.   Abdominal: Soft. Bowel sounds are normal. He exhibits no distension.   Musculoskeletal: He exhibits edema.   Lymphadenopathy:     He has no cervical adenopathy.   Neurological:   Somnolent, nonverbal    Skin: Skin is warm and dry. He is not diaphoretic. There is erythema.   Psychiatric: He is noncommunicative.   Nursing note and vitals reviewed.      Laboratory:  Recent Labs      06/30/18   1154   WBC  15.4*   RBC  5.07    HEMOGLOBIN  14.0   HEMATOCRIT  42.5   MCV  83.8   MCH  27.6   MCHC  32.9*   RDW  47.0   PLATELETCT  308   MPV  8.7*     Recent Labs      06/30/18   1154   SODIUM  138   POTASSIUM  3.8   CHLORIDE  103   CO2  27   GLUCOSE  106*   BUN  9   CREATININE  1.00   CALCIUM  8.9     Recent Labs      06/30/18   1154   ALTSGPT  28   ASTSGOT  54*   ALKPHOSPHAT  162*   TBILIRUBIN  0.8   GLUCOSE  106*     Recent Labs      06/30/18   1154   APTT  28.4   INR  1.09     Recent Labs      06/30/18   1154   BNPBTYPENAT  86         Lab Results   Component Value Date    TROPONINI <0.01 06/20/2018       Urinalysis:    Lab Results   Component Value Date    SPECGRAVITY 1.010 06/20/2018    GLUCOSEUR Negative 06/20/2018    KETONES Negative 06/20/2018    NITRITE Negative 06/20/2018    WBCURINE 0-2 (A) 06/20/2018    RBCURINE 10-20 (A) 06/20/2018    BACTERIA Negative 06/20/2018    EPITHELCELL Negative 06/20/2018        Imaging:  CT-HEAD W/O   Final Result      1.  No acute intracranial process.      2.  Atrophy and small vessel ischemic change.      DX-CHEST-PORTABLE (1 VIEW)   Final Result      No radiographic evidence of acute cardiopulmonary process.      DX-FOOT-COMPLETE 3+ RIGHT    (Results Pending)         Assessment/Plan:  I anticipate this patient will require at least two midnights for appropriate medical management, necessitating inpatient admission.    Sepsis (HCC)- (present on admission)   Assessment & Plan    - This is severe sepsis with the following associated acute organ dysfunction(s): metabolic/septic encephalopathy.   -Due to cellulitis  -Sepsis order set has been ordered, patient will receive significant IV fluids  -Patient does have chronic edema and takes Lasix at baseline, this will need to be restarted once sepsis is resolved  -Start vancomycin and Unasyn  -Await culture results        Cellulitis- (present on admission)   Assessment & Plan    -In multiple areas  -Patient also has a wound on his right foot  -Start  vancomycin and Unasyn  -Obtain limb preservation service consultation        Essential hypertension- (present on admission)   Assessment & Plan    -Continue home metoprolol  -Place as needed labetalol        Acute metabolic encephalopathy- (present on admission)   Assessment & Plan    -Due to sepsis        COPD (chronic obstructive pulmonary disease) (CMS-HCC)- (present on admission)   Assessment & Plan    -No acute exacerbation            VTE prophylaxis: Lovenox

## 2018-06-30 NOTE — PROGRESS NOTES
Pharmacy Kinetics 77 y.o. male on vancomycin day # 1    2018    Currently on Vancomycin 2300 mg IV x1 followed by 1600 mg IV q24h    Indication for Treatment: right foot cellulitis     Pertinent history per medical record: Admitted on 2018 for cellulitis. Was seen  in [facility] for the same. Was prescribed amoxicillin, but question if he actually took them. Back today with the same. 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 Limb preservation services has been consulted.     Other antibiotics: Unasyn 3 g IV q6h    Allergies: Neosporin pain relieving     List concerns for renal function: age    Pertinent cultures to date:   18: Blood, peripheral x2 = in process  18: Wound, right foot = yet to be collected    Recent Labs      18   1154   WBC  15.4*   NEUTSPOLYS  82.40*     Recent Labs      18   1154   BUN  9   CREATININE  1.00   ALBUMIN  3.7     Blood pressure 140/65, pulse 100, temperature 36.2 °C (97.2 °F), resp. rate (!) 93, height 1.829 m (6'), weight 93.9 kg (207 lb), SpO2 93 %. Temp (24hrs), Av.2 °C (97.2 °F), Min:36.2 °C (97.2 °F), Max:36.2 °C (97.2 °F)      A/P   1. Vancomycin dose change: new start  2. Next vancomycin level: 2-3 days (not ordered)  3. Goal trough: 12-16 mcg/mL  4. Comments: Patient with recent ED visit from the same. Xray without OM. LPS consulted. 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 No real c/o accumulation. Trough in a couple of days if remains on vanco.    Mann Orr, PharmD, BCPS

## 2018-06-30 NOTE — ED TRIAGE NOTES
"PT BIBA to room 38 with complaint of left sided elbow, leg, hip and \"ass\" pain. Patient states he thinks he had a stroke because his left side hurts. He said he struggled to get out of bed this morning. Patient has chronic pain. Patient also states he has swelling to right Lower leg which he was treated for 1 week ago+ with antibiotics. Patient is a poor historian  "

## 2018-06-30 NOTE — ED PROVIDER NOTES
ED Provider Note    Scribed for Sebastian Fermin M.D. by Ross Abad. 6/30/2018  11:16 AM    Primary Care Provider: FELIX Villa  Means of arrival: EMS  History limited by: Patient is a poor historian.     CHIEF COMPLAINT  Chief Complaint   Patient presents with   • Pain     left sided pain, all over   • Leg Swelling       HPI  Galileo Torrez is a 77 y.o. male who presents to the ED via EMS complaining of left knee, left arm, and left buttocks pain onset four days ago. Patient went to bed at 9:30 PM last night with no pain but symptoms were present immediately after waking up this morning at 8:30 AM. With his pain, he reports left leg and left arm weakness. He confirms a minor headache at this time as well. Patient was discharged from the hospital one week ago following treatment for cellulitis and has been compliant with all of his discharge medication.  He also reports a fall that occurred 2 days ago and expresses concern for a head injury.  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   He denies blurred vision, rhinorrhea, sore throat, chest pain, vomiting, diarrhea, rash, abdominal pain, worsening shortness of breath, rash.     History limited as the patient is a poor historian.     REVIEW OF SYSTEMS    CONSTITUTIONAL:  Confirms left arm and left leg weakness.  EYES:  Denies photophobia or discharge.   ENT:  Denies sore throat.  CARDIOVASCULAR:  Denies chest pain.  RESPIRATORY:  Denies worsening shortness of breath.  GI:  Denies abdominal pain, vomiting, or diarrhea.  MUSCULOSKELETAL:  Confirms left arm and left leg weakness.  SKIN: Redness to his left arm with heat and to his chest..  NEUROLOGIC:  Confirms headache, left arm and left leg weakness.    Review of systems limited as the patient is a poor historian. C.    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  No targets for revascularization.   • Cancer ([facility])     skin   • Carotid artery stenosis August 2014    Carotid ultrasound with LICA 50-70% stenosis, YOLANDA <50% stenosis.  Unchanged from 2013.   • Chronic systolic heart failure ([facility])    • COPD    • Cough    • Dental disorder     dentures   • Dizziness    • Fall     Multiple falls   • Heart burn    • Hiatus hernia syndrome    • High cholesterol    • Hyperlipidemia    • Hypertension    • Insomnia, unspecified    • Lumbago    • Myocardial infarct ([facility]) 1995x2    5 stents   • Other specified forms of chronic ischemic heart disease    • Peptic ulcer, unspecified site, unspecified as acute or chronic, without mention of hemorrhage, perforation, or obstruction    • Peripheral vascular disease, unspecified ([facility]) February 2012    PCI/stent (Omnilink 7 x 28mm) to the right external iliac.   • Pure hypercholesterolemia    • Snoring    • Stroke ([facility]) 2011    \"I don't know when, somebody said I did. A little one\"   • Tobacco use disorder    • Transient alteration of awareness    • Umbilical hernia without mention of obstruction or gangrene    • Unspecified disorder of kidney and ureter    • Unspecified hemorrhagic conditions     blood thinners   • Unspecified hypertensive heart disease without heart failure    • Upper GI bleed        FAMILY HISTORY  Family History   Problem Relation Age of Onset   • Heart Disease Mother    • Other Mother      brain surg   • Cancer Brother      melanoma       SOCIAL HISTORY   reports that he has been smoking Cigarettes.  He has a 55.00 pack-year smoking " history. He has never used smokeless tobacco. He reports that he does not drink alcohol or use drugs.    SURGICAL HISTORY  Past Surgical History:   Procedure Laterality Date   • FEMORAL POPLITEAL BYPASS Right 2/7/2018    Procedure: FEMORAL POPLITEAL BYPASS;  Surgeon: Lance Austin M.D.;  Location: [facility];  Service: General   • MASS EXCISION GENERAL Left 6/8/2017    Procedure: MASS EXCISION GENERAL - SQUAMOUS CELL - EAR ;  Surgeon: Fuad Moreno M.D.;  Location: SURGERY SAME DAY [facility];  Service:    • FLAP CLOSURE Left 6/8/2017    Procedure: FLAP CLOSURE;  Surgeon: Fuad Moreno M.D.;  Location: SURGERY SAME DAY [facility];  Service:    • SPLIT THICKNESS SKIN GRAFT Left 8/25/2016    Procedure: SPLIT THICKNESS SKIN GRAFT LEG;  Surgeon: Fuad Moreno M.D.;  Location: [facility];  Service:    • STUMP REVISION Left 5/11/2016    Procedure: STUMP REVISION BK DEBRIDEMENT;  Surgeon: Lance Austin M.D.;  Location: [facility];  Service:    • KNEE AMPUTATION BELOW Left 12/22/2015    Procedure: KNEE AMPUTATION BELOW , REMOVAL OF LEFT TIBIAL BYPASS GRAFT;  Surgeon: Lance Austin M.D.;  Location: [facility];  Service:    • ANGIOGRAM Left 12/21/2015    Procedure: ANGIOGRAM, LEG;  Surgeon: Lance Austin M.D.;  Location: [facility];  Service:    • FEMORAL TIBIAL BYPASS Left 12/21/2015    Procedure: FEMORAL TIBIAL BYPASS with Distaflo Graft;  Surgeon: Lance Austin M.D.;  Location: [facility];  Service:    • FEMORAL POPLITEAL BYPASS Left 11/13/2015    Procedure: FEMORAL POPLITEAL ANGIOPLASTY cryo preserve vein placement  ;  Surgeon: Lance Austin M.D.;  Location: [facility];  Service:    • TOE AMPUTATION Left 11/9/2015    Procedure: TOE AMPUTATION FIRST AND SECOND;  Surgeon: Lance Austin M.D.;  Location: [facility];  Service:    • INCISION AND DRAINAGE GENERAL Left 11/9/2015    Procedure: INCISION AND DRAINAGE  GENERAL WASHOUT, & WOUND VAC PLACEMENT LEG  ;  Surgeon: Lance Austin M.D.;  Location: SURGERY [facility];  Service:    • FEMORAL POPLITEAL BYPASS Left 10/2/2015    Procedure: FEMORAL POPLITEAL BYPASS;  Surgeon: Lance Austin M.D.;  Location: SURGERY [facility];  Service:    • ANGIOGRAM  10/2/2015    Procedure: ANGIOGRAM;  Surgeon: Lance Austin M.D.;  Location: SURGERY [facility];  Service:    • RECOVERY  9/4/2015    Procedure: CATH LAB-[facility] W/COROS AND STENT [id number], ANGINA PECTORIS 413.9;  Surgeon: Gerry Surgery;  Location: SURGERY PRE-POST PROC UNIT [facility];  Service:    • THROMBECTOMY  3/24/2015    Performed by Lance Austin M.D. at [facility]   • ANGIOGRAM  3/24/2015    Performed by Lance Austin M.D. at [facility]   • GROIN EXPLORATION  3/11/2015    Performed by Lance Austin M.D. at [facility]   • FEMORAL POPLITEAL BYPASS  2/11/2015    Performed by Lance Austin M.D. at [facility]   • RECOVERY  1/15/2015    Performed by [facility] at [facility]   • RECOVERY  2/28/2012    Performed by SURGERY, IR-RECOVERY at SURGERY SAME DAY [facility]   • GASTROSCOPY-ENDO  3/3/2010    Performed by KALE JUAREZ at ENDOSCOPY [facility]   • ENDOSTAT  3/3/2010    Performed by KALE JUAREZ at ENDOSCOPY [facility] ORS   • SCLEROTHERAPHY  3/3/2010    Performed by KALE JUAREZ at ENDOSCOPY [facility]   • MULTIPLE CORONARY ARTERY BYPASS  1994     CABG x 3, in [place]   • OTHER  1984    LEFT ORBIT FX-SURGERY PIN/PLATE   • CARDIAC CATH, RIGHT/LEFT HEART      STENT       CURRENT MEDICATIONS    Current Outpatient Prescriptions on File Prior to Encounter   Medication Sig Dispense Refill   • gabapentin (NEURONTIN) 300 MG Cap TAKE 2 CAPSULES BY MOUTH EVERY NIGHT AT BEDTIME 60 Cap 0   • gabapentin (NEURONTIN) 300 MG Cap TAKE 2 CAPSULES BY MOUTH EVERY NIGHT AT BEDTIME 60 Cap 5   • furosemide (LASIX)  "20 MG Tab Take 1 Tab by mouth every day. 30 Tab 0   • potassium chloride SA (KDUR) 20 MEQ Tab CR Take 1 Tab by mouth every day. 30 Tab 0   • clopidogrel (PLAVIX) 75 MG Tab Take 75 mg by mouth every evening.     • ezetimibe (ZETIA) 10 MG Tab Take 10 mg by mouth every evening.     • amitriptyline (ELAVIL) 150 MG Tab Take 2 Tabs by mouth every bedtime. 180 Tab 3   • ranitidine (ZANTAC) 150 MG Tab Take 1 Tab by mouth 2 times a day. 180 Tab 3   • omeprazole (PRILOSEC) 20 MG delayed-release capsule Take 1 Cap by mouth every day. 90 Cap 3   • atorvastatin (LIPITOR) 80 MG tablet Take 1 Tab by mouth every evening. 90 Tab 3   • metoprolol (LOPRESSOR) 25 MG Tab Take 2 Tabs by mouth 2 times a day. 180 Tab 3   • aspirin EC (ECOTRIN) 81 MG Tablet Delayed Response Take 81 mg by mouth every morning. Indications: Heart Attack        ALLERGIES  Allergies   Allergen Reactions   • Neosporin Pain Relieving Unspecified     Pt states \"I get blisters\".       PHYSICAL EXAM  VITAL SIGNS: /65   Pulse 88   Temp 36.2 °C (97.2 °F)   Resp (!) 93   Ht 1.829 m (6')   Wt 93.9 kg (207 lb)   SpO2 100%   BMI 28.07 kg/m²      Constitutional: Patient is awake and alert. No acute respiratory distress. Well developed, Well nourished, Non-toxic appearance.  HENT: Normocephalic, Atraumatic, Bilateral external ears normal, Oropharynx pink with no exudates, Nose patent. Throat dry  Eyes: PERRLA, EOMI, Sclera and conjunctiva injected, No discharge.   Neck:  Supple no nuchal rigidity, no thyromegaly or mass. Non-tender  Lymphatic: No supraclavicular lymph nodes.   Cardiovascular: Heart is regular rate and rhythm no murmur, distant, rub or thrill.   Thorax & Lungs: Chest is symmetrical, with decreased breath sounds and crackles. No wheezing. No respiratory distress, No chest tenderness.   Abdomen: Soft, No tenderness no hepatosplenomegaly there is no guarding or rebound, No masses, No pulsatile masses. Bowel sounds are present. Large umbilical hernia " that is reducible  Skin: Warm, Dry, no petechia, purpura, or rash. Large healed surgical scar on chest. Large scar on back. Scratch to left butt cheek with redness, looks like cellulitis.  Patient appears to have a cellulitis to his left arm left chest and right lower extremity and left buttock  Back: Non tender with palpation, .   Genitalia: Normal male  Extremities: No edema. Non tender. Right leg has edema, is pitted and red. Slight ulcerations. Below knee amputations of left knee, no ulcerations or skin breakdown, a lot of scaring below knee. Left arm has edema, is red and hot like a cellulitis, has abrasions.   Musculoskeletal: Good range of motion to wrists, elbows, shoulders, hips, knees, and ankles. Pulses 2+ radially and femorally. No gross deformities noted.  Neurologic: Alert & oriented to person, time, and place.Sensory is intact to light touch to face, arms, and legs.  DTRs are symmetrical in biceps brachioradialis, patella and Achilles. Left leg and left arm weak.   Psychiatric: Normal affect      LABS  Results for orders placed or performed during the hospital encounter of 06/30/18   CBC WITH DIFFERENTIAL   Result Value Ref Range    WBC 15.4 (H) 4.8 - 10.8 K/uL    RBC 5.07 4.70 - 6.10 M/uL    Hemoglobin 14.0 14.0 - 18.0 g/dL    Hematocrit 42.5 42.0 - 52.0 %    MCV 83.8 81.4 - 97.8 fL    MCH 27.6 27.0 - 33.0 pg    MCHC 32.9 (L) 33.7 - 35.3 g/dL    RDW 47.0 35.9 - 50.0 fL    Platelet Count 308 164 - 446 K/uL    MPV 8.7 (L) 9.0 - 12.9 fL    Neutrophils-Polys 82.40 (H) 44.00 - 72.00 %    Lymphocytes 9.90 (L) 22.00 - 41.00 %    Monocytes 5.30 0.00 - 13.40 %    Eosinophils 1.40 0.00 - 6.90 %    Basophils 0.70 0.00 - 1.80 %    Immature Granulocytes 0.30 0.00 - 0.90 %    Nucleated RBC 0.00 /100 WBC    Neutrophils (Absolute) 12.67 (H) 1.82 - 7.42 K/uL    Lymphs (Absolute) 1.52 1.00 - 4.80 K/uL    Monos (Absolute) 0.82 0.00 - 0.85 K/uL    Eos (Absolute) 0.21 0.00 - 0.51 K/uL    Baso (Absolute) 0.10 0.00 - 0.12  K/uL    Immature Granulocytes (abs) 0.05 0.00 - 0.11 K/uL    NRBC (Absolute) 0.00 K/uL   COMP METABOLIC PANEL   Result Value Ref Range    Sodium 138 135 - 145 mmol/L    Potassium 3.8 3.6 - 5.5 mmol/L    Chloride 103 96 - 112 mmol/L    Co2 27 20 - 33 mmol/L    Anion Gap 8.0 0.0 - 11.9    Glucose 106 (H) 65 - 99 mg/dL    Bun 9 8 - 22 mg/dL    Creatinine 1.00 0.50 - 1.40 mg/dL    Calcium 8.9 8.5 - 10.5 mg/dL    AST(SGOT) 54 (H) 12 - 45 U/L    ALT(SGPT) 28 2 - 50 U/L    Alkaline Phosphatase 162 (H) 30 - 99 U/L    Total Bilirubin 0.8 0.1 - 1.5 mg/dL    Albumin 3.7 3.2 - 4.9 g/dL    Total Protein 7.5 6.0 - 8.2 g/dL    Globulin 3.8 (H) 1.9 - 3.5 g/dL    A-G Ratio 1.0 g/dL   PROTHROMBIN TIME   Result Value Ref Range    PT 13.8 12.0 - 14.6 sec    INR 1.09 0.87 - 1.13   APTT   Result Value Ref Range    APTT 28.4 24.7 - 36.0 sec   BTYPE NATRIURETIC PEPTIDE   Result Value Ref Range    B Natriuretic Peptide 86 0 - 100 pg/mL   ESTIMATED GFR   Result Value Ref Range    GFR If African American >60 >60 mL/min/1.73 m 2    GFR If Non African American >60 >60 mL/min/1.73 m 2   CRP Quantitive   Result Value Ref Range    Stat C-Reactive Protein 5.45 (H) 0.00 - 0.75 mg/dL   Prealbumin   Result Value Ref Range    Pre-Albumin 12.0 (L) 18.0 - 38.0 mg/dL   Prothrombin time (INR)   Result Value Ref Range    PT 14.2 12.0 - 14.6 sec    INR 1.13 0.87 - 1.13   APTT   Result Value Ref Range    APTT 30.5 24.7 - 36.0 sec      All labs reviewed by me.    RADIOLOGY/PROCEDURES  DX-FOOT-COMPLETE 3+ RIGHT   Final Result      Negative RIGHT foot series. No plain film evidence of osteomyelitis.      CT-HEAD W/O   Final Result      1.  No acute intracranial process.      2.  Atrophy and small vessel ischemic change.      DX-CHEST-PORTABLE (1 VIEW)   Final Result      No radiographic evidence of acute cardiopulmonary process.        The radiologist's interpretations of all radiological studies have been reviewed by me.     COURSE & MEDICAL DECISION  MAKING  Pertinent Labs & Imaging studies reviewed. (See chart for details)    Here on the 20th and diagnosed with cellulitis of right lower extremity, tobacco use, shortness of breath, and kiloedema. He was placed on amoxicillin following discharge.    Differential diagnoses include but are not limited to sepsis, multiple cellulitis, staph, strep, failed outpatient antibiotics     11:16 AM - Patient seen and examined at bedside. Informed him that labs and imaging will be conducted. CT scan will be conducted as his most recent fall occurred after his trip to the emergency room. Ordered DX chest, CT head, urinalysis, blood culture, CBC, CMP, prothrombin, APTT, and BNP.  Patient will be medicated with Unasyn 3 g IV for his symptoms.     2:30 PM Consulted with Dr. Concepcion, hospitalist, regarding the above case findings. He agrees to admit.    Decision Making  Patient who presents with left arm swelling and redness not feeling well and discomfort to his left buttocks area.  On evaluating it appears he has a left buttocks cellulitis left arm cellulitis to left chest wall cellulitis.  Previously had a right leg cellulitis.  He is a patient with a lot of vascular disease.  He has been on outpatient antibiotics.  We gave him IV antibiotics here and blood cultures been taken.  I believe it will need to admit her to the hospital for the care workup and evaluation.    DISPOSITION:  Patient will be admitted to Dr. Concepcion in guarded condition.     FINAL IMPRESSION  1.  Cellulitis left arm chest and buttocks  #2 weakness  3.  Elevated liver enzymes    PLAN  1.  Admission [facility] Hospitalist  2.  IV antibiotics       Ross BLISS (Romeo), am scribing for, and in the presence of, Sebastian Fermin M.D..    Electronically signed by: Ross Abad (Romeo), 6/30/2018    ISebastian M.D. personally performed the services described in this documentation, as scribed by Ross Abad in my presence, and it is both accurate  and complete.    The note accurately reflects work and decisions made by me.  Sebastian Fermin  6/30/2018  5:20 PM

## 2018-06-30 NOTE — DIETARY
Nutrition note:  Received consult for DM diet education, but pt is on a regular diet.   Medical history is quite extensive, but does not include DM.  Glu is 106 today.    RD will monitor per dept policy.

## 2018-06-30 NOTE — ASSESSMENT & PLAN NOTE
- This is severe sepsis with the following associated acute organ dysfunction(s): metabolic/septic encephalopathy.   -Due to cellulitis  - Clinically improving though WBC count slightly elevated  - Continue IV fluids  -Patient does have chronic edema and takes Lasix at baseline, this will need to be restarted once sepsis is resolved  - Discontinued vancomycin and Unasyn, on doxycycline. MRSA swab negative  - Blood cultures negative to date   - Sputum cx + Klebsiella pneumoniae

## 2018-06-30 NOTE — ASSESSMENT & PLAN NOTE
- wound on his right foot  -Vancomycin and Unasyn discontinued, patient on p.o. doxycycline  -Wound care consulting  -PT/OT following, recommend skilled nursing facility, patient states that he would prefer to go home rather than go to skilled nursing facility  - LPS following, Vascular sx, Dr Cruz following, patient scheduled for angiogram and possible intervention today

## 2018-07-01 LAB
ALBUMIN SERPL BCP-MCNC: 2.8 G/DL (ref 3.2–4.9)
ALBUMIN/GLOB SERPL: 1 G/DL
ALP SERPL-CCNC: 121 U/L (ref 30–99)
ALT SERPL-CCNC: 17 U/L (ref 2–50)
ANION GAP SERPL CALC-SCNC: 5 MMOL/L (ref 0–11.9)
AST SERPL-CCNC: 30 U/L (ref 12–45)
BASOPHILS # BLD AUTO: 0.6 % (ref 0–1.8)
BASOPHILS # BLD: 0.09 K/UL (ref 0–0.12)
BILIRUB SERPL-MCNC: 0.9 MG/DL (ref 0.1–1.5)
BUN SERPL-MCNC: 7 MG/DL (ref 8–22)
CALCIUM SERPL-MCNC: 8 MG/DL (ref 8.5–10.5)
CHLORIDE SERPL-SCNC: 110 MMOL/L (ref 96–112)
CO2 SERPL-SCNC: 22 MMOL/L (ref 20–33)
CREAT SERPL-MCNC: 1.09 MG/DL (ref 0.5–1.4)
EKG IMPRESSION: NORMAL
EOSINOPHIL # BLD AUTO: 0.25 K/UL (ref 0–0.51)
EOSINOPHIL NFR BLD: 1.6 % (ref 0–6.9)
ERYTHROCYTE [DISTWIDTH] IN BLOOD BY AUTOMATED COUNT: 47.3 FL (ref 35.9–50)
GLOBULIN SER CALC-MCNC: 2.7 G/DL (ref 1.9–3.5)
GLUCOSE SERPL-MCNC: 99 MG/DL (ref 65–99)
HCT VFR BLD AUTO: 36.4 % (ref 42–52)
HGB BLD-MCNC: 12 G/DL (ref 14–18)
IMM GRANULOCYTES # BLD AUTO: 0.04 K/UL (ref 0–0.11)
IMM GRANULOCYTES NFR BLD AUTO: 0.3 % (ref 0–0.9)
LACTATE BLD-SCNC: 1.3 MMOL/L (ref 0.5–2)
LYMPHOCYTES # BLD AUTO: 1.58 K/UL (ref 1–4.8)
LYMPHOCYTES NFR BLD: 10.3 % (ref 22–41)
MCH RBC QN AUTO: 27.4 PG (ref 27–33)
MCHC RBC AUTO-ENTMCNC: 33 G/DL (ref 33.7–35.3)
MCV RBC AUTO: 83.1 FL (ref 81.4–97.8)
MONOCYTES # BLD AUTO: 0.95 K/UL (ref 0–0.85)
MONOCYTES NFR BLD AUTO: 6.2 % (ref 0–13.4)
NEUTROPHILS # BLD AUTO: 12.44 K/UL (ref 1.82–7.42)
NEUTROPHILS NFR BLD: 81 % (ref 44–72)
NRBC # BLD AUTO: 0 K/UL
NRBC BLD-RTO: 0 /100 WBC
PLATELET # BLD AUTO: 268 K/UL (ref 164–446)
PMV BLD AUTO: 8.8 FL (ref 9–12.9)
POTASSIUM SERPL-SCNC: 4.1 MMOL/L (ref 3.6–5.5)
PROT SERPL-MCNC: 5.5 G/DL (ref 6–8.2)
RBC # BLD AUTO: 4.38 M/UL (ref 4.7–6.1)
SODIUM SERPL-SCNC: 137 MMOL/L (ref 135–145)
WBC # BLD AUTO: 15.4 K/UL (ref 4.8–10.8)

## 2018-07-01 PROCEDURE — 770006 HCHG ROOM/CARE - MED/SURG/GYN SEMI*

## 2018-07-01 PROCEDURE — 700102 HCHG RX REV CODE 250 W/ 637 OVERRIDE(OP): Performed by: INTERNAL MEDICINE

## 2018-07-01 PROCEDURE — 36415 COLL VENOUS BLD VENIPUNCTURE: CPT

## 2018-07-01 PROCEDURE — 700105 HCHG RX REV CODE 258: Performed by: INTERNAL MEDICINE

## 2018-07-01 PROCEDURE — A9270 NON-COVERED ITEM OR SERVICE: HCPCS | Performed by: INTERNAL MEDICINE

## 2018-07-01 PROCEDURE — 80053 COMPREHEN METABOLIC PANEL: CPT

## 2018-07-01 PROCEDURE — 85025 COMPLETE CBC W/AUTO DIFF WBC: CPT

## 2018-07-01 PROCEDURE — 700111 HCHG RX REV CODE 636 W/ 250 OVERRIDE (IP): Performed by: INTERNAL MEDICINE

## 2018-07-01 PROCEDURE — 99233 SBSQ HOSP IP/OBS HIGH 50: CPT | Performed by: FAMILY MEDICINE

## 2018-07-01 PROCEDURE — 83605 ASSAY OF LACTIC ACID: CPT

## 2018-07-01 RX ADMIN — METOPROLOL TARTRATE 50 MG: 50 TABLET ORAL at 20:15

## 2018-07-01 RX ADMIN — AMPICILLIN SODIUM AND SULBACTAM SODIUM 3 G: 2; 1 INJECTION, POWDER, FOR SOLUTION INTRAMUSCULAR; INTRAVENOUS at 18:12

## 2018-07-01 RX ADMIN — CLOPIDOGREL 75 MG: 75 TABLET, FILM COATED ORAL at 20:14

## 2018-07-01 RX ADMIN — AMPICILLIN SODIUM AND SULBACTAM SODIUM 3 G: 2; 1 INJECTION, POWDER, FOR SOLUTION INTRAMUSCULAR; INTRAVENOUS at 11:31

## 2018-07-01 RX ADMIN — VANCOMYCIN HYDROCHLORIDE 1600 MG: 100 INJECTION, POWDER, LYOPHILIZED, FOR SOLUTION INTRAVENOUS at 12:31

## 2018-07-01 RX ADMIN — SODIUM CHLORIDE: 9 INJECTION, SOLUTION INTRAVENOUS at 18:45

## 2018-07-01 RX ADMIN — AMPICILLIN SODIUM AND SULBACTAM SODIUM 3 G: 2; 1 INJECTION, POWDER, FOR SOLUTION INTRAMUSCULAR; INTRAVENOUS at 05:23

## 2018-07-01 RX ADMIN — ASPIRIN 81 MG: 81 TABLET, COATED ORAL at 07:51

## 2018-07-01 RX ADMIN — AMPICILLIN SODIUM AND SULBACTAM SODIUM 3 G: 2; 1 INJECTION, POWDER, FOR SOLUTION INTRAMUSCULAR; INTRAVENOUS at 00:10

## 2018-07-01 RX ADMIN — OMEPRAZOLE 20 MG: 20 CAPSULE, DELAYED RELEASE ORAL at 07:50

## 2018-07-01 RX ADMIN — ATORVASTATIN CALCIUM 80 MG: 40 TABLET, FILM COATED ORAL at 20:14

## 2018-07-01 RX ADMIN — AMITRIPTYLINE HYDROCHLORIDE 300 MG: 150 TABLET, FILM COATED ORAL at 20:14

## 2018-07-01 RX ADMIN — GABAPENTIN 600 MG: 300 CAPSULE ORAL at 20:14

## 2018-07-01 RX ADMIN — METOPROLOL TARTRATE 50 MG: 50 TABLET ORAL at 07:51

## 2018-07-01 ASSESSMENT — COGNITIVE AND FUNCTIONAL STATUS - GENERAL
MOVING TO AND FROM BED TO CHAIR: A LITTLE
MOVING FROM LYING ON BACK TO SITTING ON SIDE OF FLAT BED: A LITTLE
WALKING IN HOSPITAL ROOM: A LITTLE
SUGGESTED CMS G CODE MODIFIER DAILY ACTIVITY: CH
STANDING UP FROM CHAIR USING ARMS: A LITTLE
SUGGESTED CMS G CODE MODIFIER MOBILITY: CK
DAILY ACTIVITIY SCORE: 24
TURNING FROM BACK TO SIDE WHILE IN FLAT BAD: A LITTLE
MOBILITY SCORE: 19

## 2018-07-01 ASSESSMENT — PATIENT HEALTH QUESTIONNAIRE - PHQ9
1. LITTLE INTEREST OR PLEASURE IN DOING THINGS: NOT AT ALL
SUM OF ALL RESPONSES TO PHQ9 QUESTIONS 1 AND 2: 0
2. FEELING DOWN, DEPRESSED, IRRITABLE, OR HOPELESS: NOT AT ALL

## 2018-07-01 ASSESSMENT — PAIN SCALES - GENERAL
PAINLEVEL_OUTOF10: 0
PAINLEVEL_OUTOF10: 0

## 2018-07-01 NOTE — RESPIRATORY CARE
COPD EDUCATION by COPD CLINICAL EDUCATOR  7/1/2018 at 7:41 AM by Maria Elena Urias     Patient reviewed by COPD education team. Patient does not qualify for COPD program at this time

## 2018-07-01 NOTE — CARE PLAN
Problem: Infection  Goal: Will remain free from infection  Outcome: PROGRESSING AS EXPECTED  Pt afebrile; IV antibiotics given per orders.     Problem: Venous Thromboembolism (VTW)/Deep Vein Thrombosis (DVT) Prevention:  Goal: Patient will participate in Venous Thrombosis (VTE)/Deep Vein Thrombosis (DVT)Prevention Measures  Outcome: PROGRESSING SLOWER THAN EXPECTED  Assessed pt pedal pulse; +1 pulse present, also auscultated with doppler.

## 2018-07-01 NOTE — PROGRESS NOTES
"[facility] Hospitalist Progress Note    Date of Service: 2018    Chief Complaint  77 y.o. male admitted 2018 with AMS and pain in multiple spots including left arm and leg as well as buttock pain.  Patient went to bed at 9:30 PM last night with no pain but symptoms were present immediately after waking up this morning at 8:30 AM. With his pain, he reports left leg and left arm weakness. He confirms a minor headache at this time as well. Patient was discharged from the hospital one week ago following treatment for cellulitis and has been compliant with all of his discharge medication.  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  Now he has left arm, chest, buttock cellulitis.    Interval Problem Update  Patient having episodes of confusion during clinical encounter. Does not remember why he is here but states he has \"pain everywhere\" and is asking for pain meds. Continues to be on Vanc and Unasyn.    Consultants/Specialty  None    Disposition  PENDING        Review of Systems   Unable to perform ROS: Mental acuity      Physical Exam  Laboratory/Imaging   Hemodynamics  Temp (24hrs), Av.5 °C (97.7 °F), Min:36.2 °C (97.2 °F), Max:37.1 °C (98.7 °F)   Temperature: 37.1 °C (98.7 °F)  Pulse  Av.3  Min: 81  Max: 104 Heart Rate (Monitored): (!) 104  Blood Pressure : 121/62, NIBP: (!) 163/69      Respiratory      Respiration: 18, Pulse Oximetry: 91 %     Work Of Breathing / Effort: Mild  RUL Breath Sounds: Diminished, RML Breath Sounds: Diminished, RLL Breath Sounds: Diminished, LAW Breath Sounds: Diminished, LLL Breath Sounds: Diminished    Fluids    Intake/Output Summary (Last 24 hours) at 18 0652  Last data filed at 18 1702   Gross per 24 hour   Intake                0 ml   Output              300 ml   Net             -300 ml       Nutrition  Orders Placed This " Encounter   Procedures   • Diet Order Regular     Standing Status:   Standing     Number of Occurrences:   1     Order Specific Question:   Diet:     Answer:   Regular [1]     Physical Exam   Constitutional: No distress.   HENT:   Head: Normocephalic and atraumatic.   Eyes: Pupils are equal, round, and reactive to light.   Neck: Normal range of motion. Neck supple.   Cardiovascular: Normal rate, regular rhythm and normal heart sounds.    Pulmonary/Chest: Effort normal and breath sounds normal.   Abdominal: Soft. Bowel sounds are normal.   Musculoskeletal: He exhibits edema.   (+) L BKA   Neurological: He is alert. He is disoriented.   Skin: He is not diaphoretic. There is erythema.   Vitals reviewed.      Recent Labs      06/30/18   1154  07/01/18   0210   WBC  15.4*  15.4*   RBC  5.07  4.38*   HEMOGLOBIN  14.0  12.0*   HEMATOCRIT  42.5  36.4*   MCV  83.8  83.1   MCH  27.6  27.4   MCHC  32.9*  33.0*   RDW  47.0  47.3   PLATELETCT  308  268   MPV  8.7*  8.8*     Recent Labs      06/30/18   1154  07/01/18   0210   SODIUM  138  137   POTASSIUM  3.8  4.1   CHLORIDE  103  110   CO2  27  22   GLUCOSE  106*  99   BUN  9  7*   CREATININE  1.00  1.09   CALCIUM  8.9  8.0*     Recent Labs      06/30/18   1154  06/30/18   1417   APTT  28.4  30.5   INR  1.09  1.13     Recent Labs      06/30/18   1154   BNPBTYPENAT  86              Assessment/Plan     Sepsis (HCC)- (present on admission)   Assessment & Plan    - This is severe sepsis with the following associated acute organ dysfunction(s): metabolic/septic encephalopathy.   -Due to cellulitis  - Improving  -Sepsis order set has been ordered, patient will receive significant IV fluids  -Patient does have chronic edema and takes Lasix at baseline, this will need to be restarted once sepsis is resolved  -Continue vancomycin and Unasyn  -Await culture results        Cellulitis- (present on admission)   Assessment & Plan    -In multiple areas  -Patient also has a wound on his right  foot  -Start vancomycin and Unasyn  -Wound care consulted        Essential hypertension- (present on admission)   Assessment & Plan    -Continue home metoprolol  -Place as needed labetalol        Acute metabolic encephalopathy- (present on admission)   Assessment & Plan    -Due to sepsis  - Slowly improving  - Continue antibiotics        COPD (chronic obstructive pulmonary disease) (CMS-[facility])- (present on admission)   Assessment & Plan    -No acute exacerbation  - Will consider breathing treatments          Quality-Core Measures

## 2018-07-01 NOTE — CARE PLAN
Problem: Infection  Goal: Will remain free from infection  Outcome: PROGRESSING AS EXPECTED  Hand hygiene will be performed when entering room, ordered abx meds will be given      Problem: Skin Integrity  Goal: Risk for impaired skin integrity will decrease  Outcome: PROGRESSING AS EXPECTED  Pt will be turned q2h, barrier cream will be applied if needed, place waffle cushion if needed, and use heel protectors if needed

## 2018-07-01 NOTE — PROGRESS NOTES
Pharmacy Kinetics 77 y.o. male on vancomycin day # 2    2018    Currently on Vancomycin 1600 mg IV q24h (1200)    LD 2300 mg @ 1341    Indication for Treatment: right foot cellulitis     Pertinent history per medical record: Admitted on 2018 for cellulitis. Was seen  in [facility] for the same. Was prescribed amoxicillin, but question if he actually took them. Back today with the same. 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 Limb preservation services has been consulted.     Other antibiotics: Unasyn 3 g IV q6h (--    Allergies: Neosporin pain relieving     List concerns for renal function: age, albumin 2.8 g/dL    Pertinent cultures to date:    18: Blood, peripheral x2 = in process   18: Wound, right foot = yet to be collected      Recent Labs      18   1154  18   0210   WBC  15.4*  15.4*   NEUTSPOLYS  82.40*  81.00*     Recent Labs      18   1154  18   0210   BUN  9  7*   CREATININE  1.00  1.09   ALBUMIN  3.7  2.8*        Blood pressure 150/60, pulse 92, temperature 36.6 °C (97.8 °F), resp. rate 19, height 1.829 m (6'), weight 93.9 kg (207 lb), SpO2 92 %. Temp (24hrs), Av.5 °C (97.7 °F), Min:36.2 °C (97.2 °F), Max:37.1 °C (98.7 °F)      A/P   1. Vancomycin dose change: No change  2. Next vancomycin level: 18 @ 1130 (prior to 3rd dose)  3. Goal trough: 12-16 mcg/mL  4. Comments:  WBC no change, Afebrile. Renal indicies stable, UOP not being collected. Cultures pending. Xray without OM.LPS consulted. 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 No real c/o accumulation. Continue on current regimen. Plan to obtain trough tomorrow prior to 3rd dose.  Pharmacist will continue to monitor daily for changes in clinical status or renal function and adjust accordingly in addition to cultures and opportunities for de-escalation.     Nurys Barrett, Pharm.D

## 2018-07-01 NOTE — PROGRESS NOTES
Pt A&Ox4, resting calmly in bed. No c/o pain at this time. NS infusing at 83 ml/hr. Fall precautions in place, pt encouraged to call as needed.     0920 Edit: Tolerated clear liquid breakfast well; no c/o coughing, choking. Advancing to cardiac per orders.

## 2018-07-01 NOTE — PROGRESS NOTES
2 RN skin check completed with Teresa RN  PT has BKA to left leg, healing scab to stump   Red scabbing to second right toe   Cellulitis to right lower extremity, scabbing and scarring throughout  Redness from incontinence around groin area  Multiple skin tears to left elbow  Redness underneath bilateral breasts  Large scar across PTS's back   Redness and scabbing to bilateral buttocks

## 2018-07-01 NOTE — PROGRESS NOTES
LIMB PRESERVATION SERVICE NOTE:    Subjective:      Reason for Consultation: Right Foot - 2nd Digit    History of Present Illness:     Patient is well known to LPS and outpatient wound care services.    Patient is a 77 y.o. male with a past medical history that includes alcohol abuse CAD, PAD,  COPD,  MI s/p CABG, anemia  and is admitted to [facility] for Sepsis. An LPS consult has been requested for evaluation of his Right foot wound.  This wound started as scratch from a GLF per the pt.  The pt has not been treating the wound. The pt is a smoker.   Patient does not have diabetes. Patient prior surgeries to the lower extremities include: a Left BKA. Fem pop bypass by Dr orantes on 2/7/18          Lab Results   Component Value Date/Time    HBA1C 6.0 (H) 10/12/2016 07:37 AM      Patient denies fevers chills, nausea, vomiting.     Pain:        Patient resting comfortably, Complains of Dependant Right Heel Pain 10 of 10  Vitals  /65   Pulse 78   Temp 36.7 °C (98 °F)   Resp 18   Ht 1.829 m (6')   Wt 93.9 kg (207 lb)   SpO2 93%   BMI 28.07 kg/m²       Objective:        PHYSICAL EXAMINATION:     General Appearance:  Well developed,  nourished, in no acute distress    Sensory Assessment       Patient sensation intact with light touch 10 of 10 points on right foot        Vascular Assessment      right +1 DP easily obliterated, not palpated PT right foot    Orthotic, protective, supportive devices:     Offloading  Heel floated    Wound Characteristics                                                    Location:Right Foot - Second Digit   Initial Evaluation  Date:7/2/2018   Tissue Type and %: 100% Red   Periwound: Pink   Drainage: None   Exposed structures None   Wound Edges:   Attached   Odor: None   S&S of Infection:   None   Edema: Localized at Site   Sensation: Intact               Measurements: Initial Evaluation  Date:7/2/2018   Length (cm) 1   Width (cm) 1   Depth (cm)    Tract/undermine            Tests and  Measures:    Labs  Recent Labs      06/30/18   1154  07/01/18   0210  07/02/18   0205   WBC  15.4*  15.4*  15.8*   RBC  5.07  4.38*  4.28*   HEMOGLOBIN  14.0  12.0*  12.0*   HEMATOCRIT  42.5  36.4*  35.9*   MCV  83.8  83.1  83.9   MCH  27.6  27.4  28.0   MCHC  32.9*  33.0*  33.4*   RDW  47.0  47.3  47.8   PLATELETCT  308  268  245   MPV  8.7*  8.8*  9.0     Recent Labs      06/30/18   1154  07/01/18   0210  07/02/18   0205   SODIUM  138  137  139   POTASSIUM  3.8  4.1  3.9   CHLORIDE  103  110  109   CO2  27  22  22   GLUCOSE  106*  99  91   BUN  9  7*  8       A1C 6.0%  ESR: Na  CRP: Na    KALPANA    Right pending    Imaging    X-Ray:   DX-FOOT-COMPLETE 3+ RIGHT   Final Result      Negative RIGHT foot series. No plain film evidence of osteomyelitis.      CT-HEAD W/O   Final Result      1.  No acute intracranial process.      2.  Atrophy and small vessel ischemic change.      DX-CHEST-PORTABLE (1 VIEW)   Final Result      No radiographic evidence of acute cardiopulmonary process.      ARTERIAL WOUND HEALING EVALUATION    (Results Pending)       Infection Management  Microbiology na      Procedures:        Debridement : NA      Cleansed with: NS                                                                           Periwound protected with: skin prep     Primary dressing: aqag     Secondary Dressing: foam     Other: tape      NURSING TO CHANGE RIGHT FOOT DRESSING EVERY 72 HOURS AND PRN FOR SATURATION OR DISLODGEMENT  Nursing to cleanse wound/periwound with Normal Saline (NS).  Pat periwound dry and apply skin prep/No Sting to periwound.  Let air dry for 1-2 minutes.  Apply a piece of AqAg (Hydrofiber Silver), cut to size, to the wound bed.  Cover with non adhesive foam, cut to size, and secure with hypafix tape.  Please take Weekly Wound Photos. Notify wound team if wound deteriorates or fails to progress.    Patient Education: Arterial Disease      Plan:       Treatment Plan and Recommendations:    1.  Labs\Imaging: KALPANA - Pend       2. Treatment:   Wound Care by Nursing, LPS to Follow.                           Dressing Orders Updated. Skin Care Updated.      Nursing to change every 72 and PRN for Saturation or Dislodgement     Antibiotics per IM    3. Orthotics/Prosthetics:      pt to get orthotics/prosthetics  as OP ability involved, pt to wear shoes that do not rub on Wound sites       Anticipated discharge plans (X):   SNF:            Home Care:            Outpatient Wound Center:        Self Care:             Other:            TBD: X    Professional Collaboration: D/W:

## 2018-07-02 LAB
ANION GAP SERPL CALC-SCNC: 8 MMOL/L (ref 0–11.9)
BASOPHILS # BLD AUTO: 0.3 % (ref 0–1.8)
BASOPHILS # BLD: 0.05 K/UL (ref 0–0.12)
BUN SERPL-MCNC: 8 MG/DL (ref 8–22)
CALCIUM SERPL-MCNC: 8.2 MG/DL (ref 8.5–10.5)
CHLORIDE SERPL-SCNC: 109 MMOL/L (ref 96–112)
CO2 SERPL-SCNC: 22 MMOL/L (ref 20–33)
CREAT SERPL-MCNC: 0.94 MG/DL (ref 0.5–1.4)
EOSINOPHIL # BLD AUTO: 0.23 K/UL (ref 0–0.51)
EOSINOPHIL NFR BLD: 1.5 % (ref 0–6.9)
ERYTHROCYTE [DISTWIDTH] IN BLOOD BY AUTOMATED COUNT: 47.8 FL (ref 35.9–50)
GLUCOSE SERPL-MCNC: 91 MG/DL (ref 65–99)
HCT VFR BLD AUTO: 35.9 % (ref 42–52)
HGB BLD-MCNC: 12 G/DL (ref 14–18)
IMM GRANULOCYTES # BLD AUTO: 0.09 K/UL (ref 0–0.11)
IMM GRANULOCYTES NFR BLD AUTO: 0.6 % (ref 0–0.9)
LYMPHOCYTES # BLD AUTO: 1.57 K/UL (ref 1–4.8)
LYMPHOCYTES NFR BLD: 9.9 % (ref 22–41)
MCH RBC QN AUTO: 28 PG (ref 27–33)
MCHC RBC AUTO-ENTMCNC: 33.4 G/DL (ref 33.7–35.3)
MCV RBC AUTO: 83.9 FL (ref 81.4–97.8)
MONOCYTES # BLD AUTO: 1.14 K/UL (ref 0–0.85)
MONOCYTES NFR BLD AUTO: 7.2 % (ref 0–13.4)
NEUTROPHILS # BLD AUTO: 12.72 K/UL (ref 1.82–7.42)
NEUTROPHILS NFR BLD: 80.5 % (ref 44–72)
NRBC # BLD AUTO: 0 K/UL
NRBC BLD-RTO: 0 /100 WBC
PLATELET # BLD AUTO: 245 K/UL (ref 164–446)
PMV BLD AUTO: 9 FL (ref 9–12.9)
POTASSIUM SERPL-SCNC: 3.9 MMOL/L (ref 3.6–5.5)
RBC # BLD AUTO: 4.28 M/UL (ref 4.7–6.1)
SODIUM SERPL-SCNC: 139 MMOL/L (ref 135–145)
VANCOMYCIN TROUGH SERPL-MCNC: 11.5 UG/ML (ref 10–20)
WBC # BLD AUTO: 15.8 K/UL (ref 4.8–10.8)

## 2018-07-02 PROCEDURE — 80048 BASIC METABOLIC PNL TOTAL CA: CPT

## 2018-07-02 PROCEDURE — 36415 COLL VENOUS BLD VENIPUNCTURE: CPT

## 2018-07-02 PROCEDURE — 97166 OT EVAL MOD COMPLEX 45 MIN: CPT

## 2018-07-02 PROCEDURE — 700111 HCHG RX REV CODE 636 W/ 250 OVERRIDE (IP): Performed by: INTERNAL MEDICINE

## 2018-07-02 PROCEDURE — G8987 SELF CARE CURRENT STATUS: HCPCS | Mod: CK

## 2018-07-02 PROCEDURE — 700105 HCHG RX REV CODE 258: Performed by: INTERNAL MEDICINE

## 2018-07-02 PROCEDURE — 93922 UPR/L XTREMITY ART 2 LEVELS: CPT

## 2018-07-02 PROCEDURE — G8979 MOBILITY GOAL STATUS: HCPCS | Mod: CI

## 2018-07-02 PROCEDURE — A9270 NON-COVERED ITEM OR SERVICE: HCPCS | Performed by: INTERNAL MEDICINE

## 2018-07-02 PROCEDURE — 700102 HCHG RX REV CODE 250 W/ 637 OVERRIDE(OP): Performed by: INTERNAL MEDICINE

## 2018-07-02 PROCEDURE — 85025 COMPLETE CBC W/AUTO DIFF WBC: CPT

## 2018-07-02 PROCEDURE — 93926 LOWER EXTREMITY STUDY: CPT

## 2018-07-02 PROCEDURE — 97162 PT EVAL MOD COMPLEX 30 MIN: CPT

## 2018-07-02 PROCEDURE — 80202 ASSAY OF VANCOMYCIN: CPT

## 2018-07-02 PROCEDURE — 770006 HCHG ROOM/CARE - MED/SURG/GYN SEMI*

## 2018-07-02 PROCEDURE — G8978 MOBILITY CURRENT STATUS: HCPCS | Mod: CL

## 2018-07-02 PROCEDURE — G8988 SELF CARE GOAL STATUS: HCPCS | Mod: CI

## 2018-07-02 PROCEDURE — 99233 SBSQ HOSP IP/OBS HIGH 50: CPT | Performed by: FAMILY MEDICINE

## 2018-07-02 RX ADMIN — AMPICILLIN SODIUM AND SULBACTAM SODIUM 3 G: 2; 1 INJECTION, POWDER, FOR SOLUTION INTRAMUSCULAR; INTRAVENOUS at 19:19

## 2018-07-02 RX ADMIN — VANCOMYCIN HYDROCHLORIDE 1600 MG: 100 INJECTION, POWDER, LYOPHILIZED, FOR SOLUTION INTRAVENOUS at 13:57

## 2018-07-02 RX ADMIN — AMPICILLIN SODIUM AND SULBACTAM SODIUM 3 G: 2; 1 INJECTION, POWDER, FOR SOLUTION INTRAMUSCULAR; INTRAVENOUS at 05:36

## 2018-07-02 RX ADMIN — STANDARDIZED SENNA CONCENTRATE AND DOCUSATE SODIUM 2 TABLET: 8.6; 5 TABLET, FILM COATED ORAL at 20:04

## 2018-07-02 RX ADMIN — AMITRIPTYLINE HYDROCHLORIDE 300 MG: 150 TABLET, FILM COATED ORAL at 22:39

## 2018-07-02 RX ADMIN — AMPICILLIN SODIUM AND SULBACTAM SODIUM 3 G: 2; 1 INJECTION, POWDER, FOR SOLUTION INTRAMUSCULAR; INTRAVENOUS at 12:36

## 2018-07-02 RX ADMIN — AMPICILLIN SODIUM AND SULBACTAM SODIUM 3 G: 2; 1 INJECTION, POWDER, FOR SOLUTION INTRAMUSCULAR; INTRAVENOUS at 23:42

## 2018-07-02 RX ADMIN — METOPROLOL TARTRATE 50 MG: 50 TABLET ORAL at 20:06

## 2018-07-02 RX ADMIN — ASPIRIN 81 MG: 81 TABLET, COATED ORAL at 08:31

## 2018-07-02 RX ADMIN — ENOXAPARIN SODIUM 40 MG: 100 INJECTION SUBCUTANEOUS at 20:05

## 2018-07-02 RX ADMIN — OMEPRAZOLE 20 MG: 20 CAPSULE, DELAYED RELEASE ORAL at 08:31

## 2018-07-02 RX ADMIN — CLOPIDOGREL 75 MG: 75 TABLET, FILM COATED ORAL at 20:05

## 2018-07-02 RX ADMIN — AMPICILLIN SODIUM AND SULBACTAM SODIUM 3 G: 2; 1 INJECTION, POWDER, FOR SOLUTION INTRAMUSCULAR; INTRAVENOUS at 00:24

## 2018-07-02 RX ADMIN — ATORVASTATIN CALCIUM 80 MG: 40 TABLET, FILM COATED ORAL at 20:05

## 2018-07-02 RX ADMIN — METOPROLOL TARTRATE 50 MG: 50 TABLET ORAL at 08:31

## 2018-07-02 RX ADMIN — SODIUM CHLORIDE: 9 INJECTION, SOLUTION INTRAVENOUS at 12:33

## 2018-07-02 RX ADMIN — GABAPENTIN 600 MG: 300 CAPSULE ORAL at 20:04

## 2018-07-02 ASSESSMENT — COGNITIVE AND FUNCTIONAL STATUS - GENERAL
DAILY ACTIVITIY SCORE: 18
PERSONAL GROOMING: A LITTLE
MOVING FROM LYING ON BACK TO SITTING ON SIDE OF FLAT BED: UNABLE
STANDING UP FROM CHAIR USING ARMS: A LITTLE
HELP NEEDED FOR BATHING: A LITTLE
DRESSING REGULAR LOWER BODY CLOTHING: A LOT
DRESSING REGULAR UPPER BODY CLOTHING: A LITTLE
MOBILITY SCORE: 10
MOVING TO AND FROM BED TO CHAIR: UNABLE
SUGGESTED CMS G CODE MODIFIER DAILY ACTIVITY: CK
TURNING FROM BACK TO SIDE WHILE IN FLAT BAD: UNABLE
TOILETING: A LITTLE
WALKING IN HOSPITAL ROOM: A LOT
CLIMB 3 TO 5 STEPS WITH RAILING: A LOT
SUGGESTED CMS G CODE MODIFIER MOBILITY: CL

## 2018-07-02 ASSESSMENT — PAIN SCALES - GENERAL: PAINLEVEL_OUTOF10: 0

## 2018-07-02 ASSESSMENT — ACTIVITIES OF DAILY LIVING (ADL): TOILETING: INDEPENDENT

## 2018-07-02 NOTE — CARE PLAN
Problem: Safety  Goal: Will remain free from injury  Outcome: PROGRESSING AS EXPECTED  Will utilize bed alarms if necesarry, non skid socks, remind patient to call for assistance if needed      Problem: Skin Integrity  Goal: Risk for impaired skin integrity will decrease  Outcome: PROGRESSING AS EXPECTED  Pt will be turned q2h, barrier cream will be applied if needed, place waffle cushion if needed, and use heel protectors if needed

## 2018-07-02 NOTE — PROGRESS NOTES
Assumed care of pt today at 0700. Pt is A&O x4. Pt denies pain today but did state he wanted to get out of bed at some point today. PT/OT worked with pt and he sat in the chair for a couple hours. Pt reported to RN that he enjoyed getting out of bed. Pt has SHELLEY WILKINSON, prosthesis is at bedside. Bed alarm is in use. Pt is in room near nursing station. Call light is with reach. Hourly rounding in place.

## 2018-07-02 NOTE — THERAPY
"Occupational Therapy Evaluation completed.   Functional Status:  MOD assist LB dressing. CGA sit to stand. Limited activity tolerance.  Plan of Care: Will benefit from Occupational Therapy 3 times per week  Discharge Recommendations:  Equipment: Will Continue to Assess for Equipment Needs. Post-acute therapy Discharge to a transitional care facility for continued skilled therapy services.     Pt is a 76 y/o male admitted due to R diabetic foot ulcer, cellulitis, AMS, and sepsis. Pt reports independence at home with ADLs and mobility. Reports intermittent use of wheelchair in home. Has LLE amputation with prosthesis. Pt with limited activity tolerance, mobility , and independence with ADLs at this time. Pt only able to tolerate standing x a total of 1 minute this AM. Due to current level of weakness and low activity tolerance pt would have difficulty managing at home. Recommend d/c to a transitional care unit for continued therapy. If pt progresses, may be appropriate for home with son's assist (if available) for IADLs and HHC OT. Pt would benefit from continued skilled OT in acute care to maximize safety and independence in ADLs and mobility prior to d/c. Encouraged progressive increase in activity and OOB to chair while hospitalized.     See \"Rehab Therapy-Acute\" Patient Summary Report for complete documentation.    "

## 2018-07-02 NOTE — CARE PLAN
Problem: Safety  Goal: Will remain free from injury  Outcome: PROGRESSING AS EXPECTED  Pt educated on using call light to call for assistance, pt verbalized understanding. Pt is in room near nursing station. Bed alarm in use.     Problem: Skin Integrity  Goal: Risk for impaired skin integrity will decrease  Outcome: PROGRESSING AS EXPECTED  RN entering room frequently to remind the patient to turn self in bed.

## 2018-07-02 NOTE — PROGRESS NOTES
Pharmacy Kinetics 77 y.o. male on vancomycin day # 3  2018    Currently on Vancomycin 1,600mg iv q24hr (1200)    Indication for Treatment: SSTI - Right foot cellulitis      Pertinent history per medical record: Admitted on 2018 for cellulitis. Was seen  in [facility] for the same. Was prescribed amoxicillin, but question if he actually took them. Back today with the same. 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 Limb preservation services has been consulted.     Imaging: Dx- Foot (18) Negative RIGHT foot series. No plain film evidence of osteomyelitis     Other antibiotics: Unasyn 3 g IV q6h      Allergies: Neosporin pain relieving      List concerns for renal function: Age, Hypoalbuminemia      Pertinent cultures to date:   18: Blood, peripheral x2 = in process     Recent Labs      18   1154  18   0210  18   0205   WBC  15.4*  15.4*  15.8*   NEUTSPOLYS  82.40*  81.00*  80.50*     Recent Labs      18   1154  18   0210  18   0205   BUN  9  7*  8   CREATININE  1.00  1.09  0.94   ALBUMIN  3.7  2.8*   --      Recent Labs      18   1139   VANCOTROUGH  11.5     Intake/Output Summary (Last 24 hours) at 18 1344  Last data filed at 18   Gross per 24 hour   Intake             1696 ml   Output              200 ml   Net             1496 ml      Blood pressure 149/67, pulse 88, temperature 36.7 °C (98.1 °F), resp. rate 14, height 1.829 m (6'), weight 93.9 kg (207 lb), SpO2 94 %. Temp (24hrs), Av.8 °C (98.2 °F), Min:36.7 °C (98 °F), Max:36.9 °C (98.4 °F)      A/P   1. Vancomycin dose change: No.   2. Next vancomycin level: ~ 2 days (not yet ordered)   3. Goal trough: 12 - 16 mcg/mL   4. Comments: Vancomycin trough level resulted this morning at 11.5mcg/mL, true trough level, close to goal range prior to the 3rd total dose - prior to or as drug is approaching steady state concentrations. Patient will begin to moderately accumulate, anticipate a normal  to high volume of distribution of vancomycin. Repeat level in a couple days to ensure patient is within goal range. Renal function is stable. VS stable. Small increase in leukocytosis, afebrile. Peripheral blood cultures are negative. No current wound cultures in process (h/o MRSA SSTI in 2016).     Dominique Gurrola, MaryD

## 2018-07-02 NOTE — THERAPY
"Pt is a 76 y/o male admitted for sepsis with R foot wound (dorsum of middle toe) and hx of L BKA with prosthetic. Pt is an unreliable historian, unable to obtain accurate or full picture of how well pt was mobilizing and managing at home. Pt reports limited to no social support at home. Pt presents to physical therapy with moderate impairments in functional mobility, balance, gait, strength and activity tolerance. Pt was able to don L prosthetic independently but appeared unable to obtain a very secure fit. Pt only able to tolerate pivot transfer to chair at bedside as he reported inital stand was \"too much.\" Pt will benefit from acute PT interventions to address present impairments.     Physical Therapy Evaluation completed.   Bed Mobility:  Supine to Sit:  (up in chair pre and post session)  Transfers: Sit to Stand: Contact Guard Assist  Gait:  Unable to progress ambulation as pt reported \"its just too much\" following initial stand       Plan of Care: Will benefit from Physical Therapy 3 times per week  Discharge Recommendations: Equipment: Will Continue to Assess for Equipment Needs. Post-acute therapy: Based on current level of function, Pt will require post acute placement to optimize safety, return to PLOF and reduce risk for future falls or potential for readmission.  May be able to return home should mobility progress in acute setting.     See \"Rehab Therapy-Acute\" Patient Summary Report for complete documentation.     "

## 2018-07-03 PROBLEM — D72.829 LEUKOCYTOSIS: Status: ACTIVE | Noted: 2018-07-03

## 2018-07-03 LAB
ANION GAP SERPL CALC-SCNC: 9 MMOL/L (ref 0–11.9)
BASOPHILS # BLD AUTO: 0.4 % (ref 0–1.8)
BASOPHILS # BLD: 0.05 K/UL (ref 0–0.12)
BUN SERPL-MCNC: 8 MG/DL (ref 8–22)
CALCIUM SERPL-MCNC: 7.9 MG/DL (ref 8.5–10.5)
CHLORIDE SERPL-SCNC: 108 MMOL/L (ref 96–112)
CO2 SERPL-SCNC: 23 MMOL/L (ref 20–33)
CREAT SERPL-MCNC: 0.81 MG/DL (ref 0.5–1.4)
EOSINOPHIL # BLD AUTO: 0.27 K/UL (ref 0–0.51)
EOSINOPHIL NFR BLD: 2.1 % (ref 0–6.9)
ERYTHROCYTE [DISTWIDTH] IN BLOOD BY AUTOMATED COUNT: 46.6 FL (ref 35.9–50)
GLUCOSE SERPL-MCNC: 83 MG/DL (ref 65–99)
HCT VFR BLD AUTO: 34.4 % (ref 42–52)
HGB BLD-MCNC: 11.3 G/DL (ref 14–18)
IMM GRANULOCYTES # BLD AUTO: 0.05 K/UL (ref 0–0.11)
IMM GRANULOCYTES NFR BLD AUTO: 0.4 % (ref 0–0.9)
LYMPHOCYTES # BLD AUTO: 1.1 K/UL (ref 1–4.8)
LYMPHOCYTES NFR BLD: 8.5 % (ref 22–41)
MCH RBC QN AUTO: 27.7 PG (ref 27–33)
MCHC RBC AUTO-ENTMCNC: 32.8 G/DL (ref 33.7–35.3)
MCV RBC AUTO: 84.3 FL (ref 81.4–97.8)
MONOCYTES # BLD AUTO: 0.95 K/UL (ref 0–0.85)
MONOCYTES NFR BLD AUTO: 7.4 % (ref 0–13.4)
MRSA DNA SPEC QL NAA+PROBE: NORMAL
NEUTROPHILS # BLD AUTO: 10.45 K/UL (ref 1.82–7.42)
NEUTROPHILS NFR BLD: 81.2 % (ref 44–72)
NRBC # BLD AUTO: 0 K/UL
NRBC BLD-RTO: 0 /100 WBC
PLATELET # BLD AUTO: 247 K/UL (ref 164–446)
PMV BLD AUTO: 9.2 FL (ref 9–12.9)
POTASSIUM SERPL-SCNC: 3.5 MMOL/L (ref 3.6–5.5)
RBC # BLD AUTO: 4.08 M/UL (ref 4.7–6.1)
SIGNIFICANT IND 70042: NORMAL
SITE SITE: NORMAL
SODIUM SERPL-SCNC: 140 MMOL/L (ref 135–145)
SOURCE SOURCE: NORMAL
WBC # BLD AUTO: 12.9 K/UL (ref 4.8–10.8)

## 2018-07-03 PROCEDURE — 700102 HCHG RX REV CODE 250 W/ 637 OVERRIDE(OP): Performed by: INTERNAL MEDICINE

## 2018-07-03 PROCEDURE — 80048 BASIC METABOLIC PNL TOTAL CA: CPT

## 2018-07-03 PROCEDURE — 99233 SBSQ HOSP IP/OBS HIGH 50: CPT | Performed by: INTERNAL MEDICINE

## 2018-07-03 PROCEDURE — 85025 COMPLETE CBC W/AUTO DIFF WBC: CPT

## 2018-07-03 PROCEDURE — 700111 HCHG RX REV CODE 636 W/ 250 OVERRIDE (IP): Performed by: INTERNAL MEDICINE

## 2018-07-03 PROCEDURE — A9270 NON-COVERED ITEM OR SERVICE: HCPCS | Performed by: INTERNAL MEDICINE

## 2018-07-03 PROCEDURE — 770006 HCHG ROOM/CARE - MED/SURG/GYN SEMI*

## 2018-07-03 PROCEDURE — 87070 CULTURE OTHR SPECIMN AEROBIC: CPT

## 2018-07-03 PROCEDURE — 36415 COLL VENOUS BLD VENIPUNCTURE: CPT

## 2018-07-03 PROCEDURE — 87641 MR-STAPH DNA AMP PROBE: CPT

## 2018-07-03 PROCEDURE — 700105 HCHG RX REV CODE 258: Performed by: INTERNAL MEDICINE

## 2018-07-03 PROCEDURE — 87077 CULTURE AEROBIC IDENTIFY: CPT

## 2018-07-03 PROCEDURE — 87205 SMEAR GRAM STAIN: CPT

## 2018-07-03 PROCEDURE — 87186 SC STD MICRODIL/AGAR DIL: CPT | Mod: 91

## 2018-07-03 RX ORDER — DOXYCYCLINE 100 MG/1
100 TABLET ORAL EVERY 12 HOURS
Status: DISCONTINUED | OUTPATIENT
Start: 2018-07-03 | End: 2018-07-10 | Stop reason: HOSPADM

## 2018-07-03 RX ORDER — AMOXICILLIN 500 MG/1
2000 CAPSULE ORAL 2 TIMES DAILY
Status: DISCONTINUED | OUTPATIENT
Start: 2018-07-03 | End: 2018-07-05

## 2018-07-03 RX ADMIN — METOPROLOL TARTRATE 50 MG: 50 TABLET ORAL at 09:15

## 2018-07-03 RX ADMIN — CLOPIDOGREL 75 MG: 75 TABLET, FILM COATED ORAL at 20:26

## 2018-07-03 RX ADMIN — STANDARDIZED SENNA CONCENTRATE AND DOCUSATE SODIUM 2 TABLET: 8.6; 5 TABLET, FILM COATED ORAL at 20:27

## 2018-07-03 RX ADMIN — SODIUM CHLORIDE: 9 INJECTION, SOLUTION INTRAVENOUS at 23:31

## 2018-07-03 RX ADMIN — AMPICILLIN SODIUM AND SULBACTAM SODIUM 3 G: 2; 1 INJECTION, POWDER, FOR SOLUTION INTRAMUSCULAR; INTRAVENOUS at 12:13

## 2018-07-03 RX ADMIN — STANDARDIZED SENNA CONCENTRATE AND DOCUSATE SODIUM 2 TABLET: 8.6; 5 TABLET, FILM COATED ORAL at 09:15

## 2018-07-03 RX ADMIN — OMEPRAZOLE 20 MG: 20 CAPSULE, DELAYED RELEASE ORAL at 09:15

## 2018-07-03 RX ADMIN — ENOXAPARIN SODIUM 40 MG: 100 INJECTION SUBCUTANEOUS at 20:27

## 2018-07-03 RX ADMIN — GABAPENTIN 600 MG: 300 CAPSULE ORAL at 20:26

## 2018-07-03 RX ADMIN — DOXYCYCLINE 100 MG: 100 TABLET ORAL at 20:26

## 2018-07-03 RX ADMIN — ASPIRIN 81 MG: 81 TABLET, COATED ORAL at 09:15

## 2018-07-03 RX ADMIN — VANCOMYCIN HYDROCHLORIDE 1600 MG: 100 INJECTION, POWDER, LYOPHILIZED, FOR SOLUTION INTRAVENOUS at 12:56

## 2018-07-03 RX ADMIN — ATORVASTATIN CALCIUM 80 MG: 40 TABLET, FILM COATED ORAL at 20:26

## 2018-07-03 RX ADMIN — AMITRIPTYLINE HYDROCHLORIDE 300 MG: 150 TABLET, FILM COATED ORAL at 20:29

## 2018-07-03 RX ADMIN — AMOXICILLIN 2000 MG: 500 CAPSULE ORAL at 20:28

## 2018-07-03 RX ADMIN — AMPICILLIN SODIUM AND SULBACTAM SODIUM 3 G: 2; 1 INJECTION, POWDER, FOR SOLUTION INTRAMUSCULAR; INTRAVENOUS at 05:27

## 2018-07-03 ASSESSMENT — PAIN SCALES - GENERAL
PAINLEVEL_OUTOF10: 0
PAINLEVEL_OUTOF10: 0

## 2018-07-03 NOTE — CARE PLAN
Problem: Fluid Volume:  Goal: Will maintain balanced intake and output  Outcome: PROGRESSING AS EXPECTED  Pt receiving NS at 83. Encouraging pt to increase PO intake as well     Problem: Respiratory:  Goal: Respiratory status will improve  Outcome: PROGRESSING AS EXPECTED  Pt required an increase of O2 at night. Encouraging pt to sit up in bed and take deep breaths. Encouraging coughing.

## 2018-07-03 NOTE — CARE PLAN
Problem: Safety  Goal: Will remain free from falls    Intervention: Implement fall precautions  Call light and personal belongings within reach. Pt instructed to call for assistance, pt verbalizes understanding. Non skid socks on. Strip alarm in place. Bed in lowest position.       Problem: Venous Thromboembolism (VTW)/Deep Vein Thrombosis (DVT) Prevention:  Goal: Patient will participate in Venous Thrombosis (VTE)/Deep Vein Thrombosis (DVT)Prevention Measures    Intervention: Assess and monitor for anticoagulation complications  Patient is on lovenox for dvt prophylxis without complication,educated patient about the medication and verbalized understanding.

## 2018-07-03 NOTE — PROGRESS NOTES
[facility] Hospitalist Progress Note    Date of Service: 7/3/2018    Chief Complaint  77 y.o. male admitted 2018 with  AMS and pain in multiple spots including left arm and leg as well as buttock pain.  Patient went to bed at 9:30 PM last night with no pain but symptoms were present immediately after waking up this morning at 8:30 AM. With his pain, he reports left leg and left arm weakness. He confirms a minor headache at this time as well. Patient was discharged from the hospital one week ago following treatment for cellulitis and has been compliant with all of his discharge medication.  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  Now he has left arm, chest, buttock cellulitis.    Interval Problem Update  No acute events overnight. Pt on abx therapy, blood cx NGTD. MRSA swab ordered.     Consultants/Specialty  None    Disposition  PENDING  PT/OT recommend SNF placement, referral placed.         Review of Systems   Unable to perform ROS: Mental acuity      Physical Exam  Laboratory/Imaging   Hemodynamics  Temp (24hrs), Av.6 °C (97.8 °F), Min:36.3 °C (97.4 °F), Max:36.7 °C (98.1 °F)   Temperature: 36.3 °C (97.4 °F)  Pulse  Av.5  Min: 78  Max: 104   Blood Pressure : 134/66      Respiratory      Respiration: (!) 22, Pulse Oximetry: 94 %     Work Of Breathing / Effort: Mild  RUL Breath Sounds: Diminished, RML Breath Sounds: Diminished, RLL Breath Sounds: Diminished, LAW Breath Sounds: Diminished, LLL Breath Sounds: Diminished    Fluids    Intake/Output Summary (Last 24 hours) at 18 1221  Last data filed at 18 1000   Gross per 24 hour   Intake             1489 ml   Output              560 ml   Net              929 ml       Nutrition  Orders Placed This Encounter   Procedures   • Diet Order Cardiac     Standing Status:   Standing     Number of Occurrences:   1     Order  Specific Question:   Diet:     Answer:   Cardiac [6]     Physical Exam   Constitutional: No distress.   HENT:   Head: Normocephalic and atraumatic.   Eyes: Conjunctivae are normal.   Neck: Normal range of motion. Neck supple.   Cardiovascular: Normal rate, regular rhythm and normal heart sounds.    Pulmonary/Chest: Effort normal and breath sounds normal.   Abdominal: Soft. Bowel sounds are normal.   Musculoskeletal: He exhibits edema.   (+) L BKA   Neurological: He is alert. He is disoriented.   Skin: He is not diaphoretic. There is erythema.   Vitals reviewed.      Recent Labs      07/01/18   0210 07/02/18   0205  07/03/18   0345   WBC  15.4*  15.8*  12.9*   RBC  4.38*  4.28*  4.08*   HEMOGLOBIN  12.0*  12.0*  11.3*   HEMATOCRIT  36.4*  35.9*  34.4*   MCV  83.1  83.9  84.3   MCH  27.4  28.0  27.7   MCHC  33.0*  33.4*  32.8*   RDW  47.3  47.8  46.6   PLATELETCT  268  245  247   MPV  8.8*  9.0  9.2     Recent Labs      07/01/18   0210 07/02/18   0205  07/03/18   0345   SODIUM  137  139  140   POTASSIUM  4.1  3.9  3.5*   CHLORIDE  110  109  108   CO2  22  22  23   GLUCOSE  99  91  83   BUN  7*  8  8   CREATININE  1.09  0.94  0.81   CALCIUM  8.0*  8.2*  7.9*     Recent Labs      06/30/18   1417   APTT  30.5   INR  1.13                  Assessment/Plan     Sepsis (HCC)- (present on admission)   Assessment & Plan    - This is severe sepsis with the following associated acute organ dysfunction(s): metabolic/septic encephalopathy.   -Due to cellulitis  - Clinically improving though WBC count slightly elevated  - Continue IV fluids  -Patient does have chronic edema and takes Lasix at baseline, this will need to be restarted once sepsis is resolved  -Continue vancomycin and Unasyn, MRSA swab ordered  - Blood cultures negative to date         Cellulitis- (present on admission)   Assessment & Plan    - wound on his right foot  -Continue vancomycin and Unasyn  -Wound care consulting  -PT/OT following        Essential  hypertension- (present on admission)   Assessment & Plan    -Improving  - Continue home metoprolol  - Also continue as needed labetalol        Leukocytosis   Assessment & Plan    Improving, secondary to above. CTM          Acute metabolic encephalopathy- (present on admission)   Assessment & Plan    -Due to sepsis  - Slowly improving  - Continue antibiotics          COPD (chronic obstructive pulmonary disease) (CMS-[facility])- (present on admission)   Assessment & Plan    -No acute exacerbation  - Will consider breathing treatments          Quality-Core Measures   Reviewed items::  Medications reviewed  Romero catheter::  No Romero  DVT prophylaxis pharmacological::  Enoxaparin (Lovenox)  Antibiotics:  Treating active infection/contamination beyond 24 hours perioperative coverage  Assessed for rehabilitation services:  Patient was assess for and/or received rehabilitation services during this hospitalization

## 2018-07-03 NOTE — PROGRESS NOTES
Around 0100,patient was c/o sob,on oxygen 2L per nc,saturation was only 87-88%,increase to 3L,placed head of the bed higher,educated patient to take a deep breath,bilateral lungs are diminished,10 minutes after,patient sleeping comfortably,will continue to monitor for changes.

## 2018-07-03 NOTE — PROGRESS NOTES
"Pt is AAO x 4. Denies pain at this time but grimaced when I helped him reposition. He states that at rest his pain is \"ok\". He is on 3 L of O2. Per NOC nurse he de sated at night. His O2 sats have been good this morning though pt states he feels SOB at times. Have encouraged pt to deep breath. NS is running at 83 ml/hr. Bed is locked and in low position, call light at reach, hourly rounding in place, bed alarm on.   "

## 2018-07-04 PROBLEM — F41.9 ANXIETY: Status: ACTIVE | Noted: 2018-07-04

## 2018-07-04 LAB
ERYTHROCYTE [DISTWIDTH] IN BLOOD BY AUTOMATED COUNT: 47.5 FL (ref 35.9–50)
GRAM STN SPEC: NORMAL
HCT VFR BLD AUTO: 37 % (ref 42–52)
HGB BLD-MCNC: 11.9 G/DL (ref 14–18)
MCH RBC QN AUTO: 27.3 PG (ref 27–33)
MCHC RBC AUTO-ENTMCNC: 32.2 G/DL (ref 33.7–35.3)
MCV RBC AUTO: 84.9 FL (ref 81.4–97.8)
PLATELET # BLD AUTO: 275 K/UL (ref 164–446)
PMV BLD AUTO: 9.1 FL (ref 9–12.9)
RBC # BLD AUTO: 4.36 M/UL (ref 4.7–6.1)
SIGNIFICANT IND 70042: NORMAL
SITE SITE: NORMAL
SOURCE SOURCE: NORMAL
WBC # BLD AUTO: 10.6 K/UL (ref 4.8–10.8)

## 2018-07-04 PROCEDURE — A9270 NON-COVERED ITEM OR SERVICE: HCPCS | Performed by: INTERNAL MEDICINE

## 2018-07-04 PROCEDURE — 99232 SBSQ HOSP IP/OBS MODERATE 35: CPT | Performed by: INTERNAL MEDICINE

## 2018-07-04 PROCEDURE — 36415 COLL VENOUS BLD VENIPUNCTURE: CPT

## 2018-07-04 PROCEDURE — 700105 HCHG RX REV CODE 258: Performed by: INTERNAL MEDICINE

## 2018-07-04 PROCEDURE — 770006 HCHG ROOM/CARE - MED/SURG/GYN SEMI*

## 2018-07-04 PROCEDURE — 700102 HCHG RX REV CODE 250 W/ 637 OVERRIDE(OP): Performed by: INTERNAL MEDICINE

## 2018-07-04 PROCEDURE — 700111 HCHG RX REV CODE 636 W/ 250 OVERRIDE (IP): Performed by: INTERNAL MEDICINE

## 2018-07-04 PROCEDURE — 97535 SELF CARE MNGMENT TRAINING: CPT

## 2018-07-04 PROCEDURE — 85027 COMPLETE CBC AUTOMATED: CPT

## 2018-07-04 RX ORDER — ESCITALOPRAM OXALATE 10 MG/1
10 TABLET ORAL DAILY
Status: DISCONTINUED | OUTPATIENT
Start: 2018-07-04 | End: 2018-07-04

## 2018-07-04 RX ORDER — LORAZEPAM 0.5 MG/1
0.5 TABLET ORAL EVERY 12 HOURS PRN
Status: DISCONTINUED | OUTPATIENT
Start: 2018-07-04 | End: 2018-07-10 | Stop reason: HOSPADM

## 2018-07-04 RX ADMIN — SODIUM CHLORIDE: 9 INJECTION, SOLUTION INTRAVENOUS at 15:16

## 2018-07-04 RX ADMIN — ENOXAPARIN SODIUM 40 MG: 100 INJECTION SUBCUTANEOUS at 21:29

## 2018-07-04 RX ADMIN — OMEPRAZOLE 20 MG: 20 CAPSULE, DELAYED RELEASE ORAL at 09:29

## 2018-07-04 RX ADMIN — AMOXICILLIN 2000 MG: 500 CAPSULE ORAL at 21:30

## 2018-07-04 RX ADMIN — SERTRALINE 25 MG: 50 TABLET, FILM COATED ORAL at 13:34

## 2018-07-04 RX ADMIN — STANDARDIZED SENNA CONCENTRATE AND DOCUSATE SODIUM 2 TABLET: 8.6; 5 TABLET, FILM COATED ORAL at 09:29

## 2018-07-04 RX ADMIN — METOPROLOL TARTRATE 50 MG: 50 TABLET ORAL at 09:29

## 2018-07-04 RX ADMIN — ASPIRIN 81 MG: 81 TABLET, COATED ORAL at 09:29

## 2018-07-04 RX ADMIN — AMITRIPTYLINE HYDROCHLORIDE 300 MG: 150 TABLET, FILM COATED ORAL at 21:29

## 2018-07-04 RX ADMIN — AMOXICILLIN 2000 MG: 500 CAPSULE ORAL at 09:27

## 2018-07-04 RX ADMIN — STANDARDIZED SENNA CONCENTRATE AND DOCUSATE SODIUM 2 TABLET: 8.6; 5 TABLET, FILM COATED ORAL at 21:31

## 2018-07-04 RX ADMIN — ATORVASTATIN CALCIUM 80 MG: 40 TABLET, FILM COATED ORAL at 21:30

## 2018-07-04 RX ADMIN — METOPROLOL TARTRATE 50 MG: 50 TABLET ORAL at 21:30

## 2018-07-04 RX ADMIN — DOXYCYCLINE 100 MG: 100 TABLET ORAL at 21:29

## 2018-07-04 RX ADMIN — CLOPIDOGREL 75 MG: 75 TABLET, FILM COATED ORAL at 21:29

## 2018-07-04 RX ADMIN — DOXYCYCLINE 100 MG: 100 TABLET ORAL at 09:28

## 2018-07-04 RX ADMIN — GABAPENTIN 600 MG: 300 CAPSULE ORAL at 21:29

## 2018-07-04 ASSESSMENT — COGNITIVE AND FUNCTIONAL STATUS - GENERAL
DRESSING REGULAR UPPER BODY CLOTHING: A LITTLE
TOILETING: A LITTLE
DAILY ACTIVITIY SCORE: 20
DRESSING REGULAR LOWER BODY CLOTHING: A LITTLE
SUGGESTED CMS G CODE MODIFIER DAILY ACTIVITY: CJ
HELP NEEDED FOR BATHING: A LITTLE

## 2018-07-04 ASSESSMENT — ENCOUNTER SYMPTOMS
COUGH: 0
DEPRESSION: 0
NAUSEA: 0
ABDOMINAL PAIN: 0
HEADACHES: 0
SHORTNESS OF BREATH: 0
NERVOUS/ANXIOUS: 1
CONSTIPATION: 0

## 2018-07-04 ASSESSMENT — PAIN SCALES - GENERAL
PAINLEVEL_OUTOF10: 0
PAINLEVEL_OUTOF10: 0

## 2018-07-04 NOTE — PROGRESS NOTES
[facility] Hospitalist Progress Note    Date of Service: 7/4/2018    Chief Complaint  77 y.o. male admitted 6/30/2018 with  AMS and pain in multiple spots including left arm and leg as well as buttock pain.  Patient went to bed at 9:30 PM last night with no pain but symptoms were present immediately after waking up this morning at 8:30 AM. With his pain, he reports left leg and left arm weakness. He confirms a minor headache at this time as well. Patient was discharged from the hospital one week ago following treatment for cellulitis and has been compliant with all of his discharge medication.  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  Now he has left arm, chest, buttock cellulitis.    Interval Problem Update  PT/OT are recommending skilled nursing facility, patient refusing.  Patient states that his throat hurts and that is why he does not want to go to a skilled nursing facility.  I have told tried to make him understand that we can address his throat concerns but rehab therapy is recommended to help conditioning.  Patient lacks understanding and insight into his medical condition.  Case was discussed with patient's son Srini over the phone who states that he will try to talk to his father.  He will be coming to the hospital later today to discuss plan of care with his father.  Patient complains of anxiety, started on Lexapro and Ativan as needed    Consultants/Specialty  None    Disposition  PENDING  PT/OT recommend SNF placement, referral placed.         Review of Systems   HENT:        Throat pain but cannot describe it further     Respiratory: Negative for cough and shortness of breath.    Cardiovascular: Negative for chest pain.   Gastrointestinal: Negative for abdominal pain, constipation and nausea.   Neurological: Negative for headaches.   Psychiatric/Behavioral: Negative for  depression and suicidal ideas. The patient is nervous/anxious.       Physical Exam  Laboratory/Imaging   Hemodynamics  Temp (24hrs), Av.5 °C (97.7 °F), Min:36.2 °C (97.2 °F), Max:36.7 °C (98.1 °F)   Temperature: 36.6 °C (97.8 °F)  Pulse  Av  Min: 78  Max: 104   Blood Pressure : (!) 177/72      Respiratory      Respiration: 17, Pulse Oximetry: 91 %     Work Of Breathing / Effort: Mild  RUL Breath Sounds: Diminished, RML Breath Sounds: Diminished, RLL Breath Sounds: Diminished, LAW Breath Sounds: Diminished, LLL Breath Sounds: Diminished    Fluids    Intake/Output Summary (Last 24 hours) at 18 1101  Last data filed at 18 0500   Gross per 24 hour   Intake                0 ml   Output              690 ml   Net             -690 ml       Nutrition  Orders Placed This Encounter   Procedures   • Diet Order Cardiac     Standing Status:   Standing     Number of Occurrences:   1     Order Specific Question:   Diet:     Answer:   Cardiac [6]     Physical Exam   Constitutional: He is oriented to person, place, and time. No distress.   HENT:   Head: Normocephalic and atraumatic.   Eyes: Conjunctivae are normal.   Neck: Normal range of motion. Neck supple.   Cardiovascular: Normal rate, regular rhythm and normal heart sounds.    Pulmonary/Chest: Effort normal and breath sounds normal.   Abdominal: Soft. Bowel sounds are normal. There is no tenderness.   Musculoskeletal:   (+) L BKA   Neurological: He is alert and oriented to person, place, and time.   Skin: He is not diaphoretic. There is erythema.   Vitals reviewed.      Recent Labs      18   0225   WBC  15.8*  12.9*  10.6   RBC  4.28*  4.08*  4.36*   HEMOGLOBIN  12.0*  11.3*  11.9*   HEMATOCRIT  35.9*  34.4*  37.0*   MCV  83.9  84.3  84.9   MCH  28.0  27.7  27.3   MCHC  33.4*  32.8*  32.2*   RDW  47.8  46.6  47.5   PLATELETCT  245  247  275   MPV  9.0  9.2  9.1     Recent Labs      185  18    SODIUM  139  140   POTASSIUM  3.9  3.5*   CHLORIDE  109  108   CO2  22  23   GLUCOSE  91  83   BUN  8  8   CREATININE  0.94  0.81   CALCIUM  8.2*  7.9*                      Assessment/Plan     Sepsis (HCC)- (present on admission)   Assessment & Plan    - This is severe sepsis with the following associated acute organ dysfunction(s): metabolic/septic encephalopathy.   -Due to cellulitis  - Clinically improving though WBC count slightly elevated  - Continue IV fluids  -Patient does have chronic edema and takes Lasix at baseline, this will need to be restarted once sepsis is resolved  - Discontinued vancomycin and Unasyn, on doxycycline. MRSA swab negative  - Blood cultures negative to date   - Sputum cx + gram neg rods, lactose fermenting, light growth        Cellulitis- (present on admission)   Assessment & Plan    - wound on his right foot  -Vancomycin and Unasyn discontinued, patient on p.o. doxycycline  -Wound care consulting  -PT/OT following, recommend skilled nursing facility, patient states that he would prefer to go home rather than go to skilled nursing facility        Essential hypertension- (present on admission)   Assessment & Plan    -Improving  - Continue home metoprolol  - Also continue as needed labetalol        Anxiety   Assessment & Plan    Pt started on Lexapro.   PRN ativan ordered        Leukocytosis   Assessment & Plan    Improving, secondary to above. CTM          Acute metabolic encephalopathy- (present on admission)   Assessment & Plan    -Due to sepsis  - Slowly improving  - Continue antibiotics          COPD (chronic obstructive pulmonary disease) (CMS-HCC)- (present on admission)   Assessment & Plan    -No acute exacerbation  - Will consider breathing treatments          Quality-Core Measures   Reviewed items::  Medications reviewed  Romero catheter::  No Romero  DVT prophylaxis pharmacological::  Enoxaparin (Lovenox)  Antibiotics:  Treating active infection/contamination beyond 24 hours  perioperative coverage  Assessed for rehabilitation services:  Patient was assess for and/or received rehabilitation services during this hospitalization

## 2018-07-04 NOTE — CARE PLAN
Problem: Safety  Goal: Will remain free from falls    Intervention: Implement fall precautions  Call light and personal belongings within reach. Pt instructed to call for assistance, pt verbalizes understanding. Non skid socks on. Strip alarm in place. Bed in lowest position.       Problem: Respiratory:  Goal: Respiratory status will improve    Intervention: Administer and titrate oxygen therapy  Patient on 3 L of O2, tolerating well without respiratory distress with ,patient sleeping comfortably.

## 2018-07-04 NOTE — PROGRESS NOTES
"Pt is very anxious. He states that he does not have anxiety but yet he continuously states \"I can't breath\". Have checked his VS and pt is sating fine. He is on continuous pulse Ox. Have encouraged pt to take deep breaths but pt breaths very shallow despite education. Have encouraged pt to deep breath and cough. Pt states \"I had a seizure and no one witnessed it\". RN asked pt when this happened and he states \"just now\" however, dr and RN where in the room prior. He also states \"I can't go to rehab I can't breath\". He states he becomes SOB by simply picking up paper off the floor. Have mentioned to pt that this is unsafe and those are all reasons why he should go to a rehab so that he may have help. Per pt \"I just need my couch and my own TV\". Continuously educating pt and checking his WOB. Pt has taken his medications whole although he was hesitant at the amount of medications. Educated on his medications and his dosage, as well as frequency. Bed locked and in low position, call light in reach, hourly rounding in place, bed alarm on.   "

## 2018-07-04 NOTE — THERAPY
"Occupational Therapy Treatment completed with focus on ADLs, ADL transfers and patient education.  Functional Status: Pt seen for OT tx today, demonstrates improved activity tolerance from last session, noted decreased safety awareness, problem solving(cues required to progress with task at times), decreased insight into current deficits. Pt received seated eob, don/doff LLE prosthesis with supervision, donned pants with cga/sba cues for problem solving as pt unable to recognize why his pants were not threading up further, continued to struggle w/o any attempts to correct until therapist provided solution, ambulated to BR with cga using FWW and O2 line management, toileting task with cga and toilet t/f's with min a to come upright from low seat, reports this is same height as home, oral care seated with supervision, Reports resides by self, son checks in 1x/wk to assist with transportation, providing inconsistent functioning level to this therapist within session; given above finding's pt does not appear to be safe to d/c home by self. Will continue to follow while in house.   Plan of Care: Will benefit from Occupational Therapy 3 times per week  Discharge Recommendations:  Equipment Will Continue to Assess for Equipment Needs. Post-acute therapy Discharge to a transitional care facility for continued skilled therapy services.    See \"Rehab Therapy-Acute\" Patient Summary Report for complete documentation.     "

## 2018-07-05 LAB
BACTERIA BLD CULT: NORMAL
BACTERIA BLD CULT: NORMAL
SIGNIFICANT IND 70042: NORMAL
SIGNIFICANT IND 70042: NORMAL
SITE SITE: NORMAL
SITE SITE: NORMAL
SOURCE SOURCE: NORMAL
SOURCE SOURCE: NORMAL

## 2018-07-05 PROCEDURE — 700102 HCHG RX REV CODE 250 W/ 637 OVERRIDE(OP): Performed by: INTERNAL MEDICINE

## 2018-07-05 PROCEDURE — 97116 GAIT TRAINING THERAPY: CPT

## 2018-07-05 PROCEDURE — 99232 SBSQ HOSP IP/OBS MODERATE 35: CPT | Performed by: INTERNAL MEDICINE

## 2018-07-05 PROCEDURE — A9270 NON-COVERED ITEM OR SERVICE: HCPCS | Performed by: INTERNAL MEDICINE

## 2018-07-05 PROCEDURE — 770006 HCHG ROOM/CARE - MED/SURG/GYN SEMI*

## 2018-07-05 RX ADMIN — METOPROLOL TARTRATE 50 MG: 50 TABLET ORAL at 08:35

## 2018-07-05 RX ADMIN — ASPIRIN 81 MG: 81 TABLET, COATED ORAL at 08:35

## 2018-07-05 RX ADMIN — AMITRIPTYLINE HYDROCHLORIDE 300 MG: 150 TABLET, FILM COATED ORAL at 20:37

## 2018-07-05 RX ADMIN — CLOPIDOGREL 75 MG: 75 TABLET, FILM COATED ORAL at 20:37

## 2018-07-05 RX ADMIN — ATORVASTATIN CALCIUM 80 MG: 40 TABLET, FILM COATED ORAL at 20:37

## 2018-07-05 RX ADMIN — DOXYCYCLINE 100 MG: 100 TABLET ORAL at 20:38

## 2018-07-05 RX ADMIN — SERTRALINE 25 MG: 50 TABLET, FILM COATED ORAL at 08:34

## 2018-07-05 RX ADMIN — METOPROLOL TARTRATE 75 MG: 25 TABLET, FILM COATED ORAL at 20:38

## 2018-07-05 RX ADMIN — DOXYCYCLINE 100 MG: 100 TABLET ORAL at 08:35

## 2018-07-05 RX ADMIN — GABAPENTIN 600 MG: 300 CAPSULE ORAL at 20:38

## 2018-07-05 RX ADMIN — STANDARDIZED SENNA CONCENTRATE AND DOCUSATE SODIUM 2 TABLET: 8.6; 5 TABLET, FILM COATED ORAL at 08:35

## 2018-07-05 RX ADMIN — OMEPRAZOLE 20 MG: 20 CAPSULE, DELAYED RELEASE ORAL at 08:35

## 2018-07-05 ASSESSMENT — COGNITIVE AND FUNCTIONAL STATUS - GENERAL
MOVING FROM LYING ON BACK TO SITTING ON SIDE OF FLAT BED: A LITTLE
STANDING UP FROM CHAIR USING ARMS: A LITTLE
MOVING TO AND FROM BED TO CHAIR: A LOT
SUGGESTED CMS G CODE MODIFIER MOBILITY: CK
MOBILITY SCORE: 16
WALKING IN HOSPITAL ROOM: A LITTLE
CLIMB 3 TO 5 STEPS WITH RAILING: A LOT
TURNING FROM BACK TO SIDE WHILE IN FLAT BAD: A LITTLE

## 2018-07-05 ASSESSMENT — GAIT ASSESSMENTS
DEVIATION: INCREASED BASE OF SUPPORT;BRADYKINETIC
DISTANCE (FEET): 80
ASSISTIVE DEVICE: FRONT WHEEL WALKER
GAIT LEVEL OF ASSIST: CONTACT GUARD ASSIST

## 2018-07-05 ASSESSMENT — ENCOUNTER SYMPTOMS
NAUSEA: 0
COUGH: 0
SHORTNESS OF BREATH: 0
HEADACHES: 0
CONSTIPATION: 0
NERVOUS/ANXIOUS: 1
DEPRESSION: 0
DIZZINESS: 0
ABDOMINAL PAIN: 0

## 2018-07-05 ASSESSMENT — PAIN SCALES - GENERAL
PAINLEVEL_OUTOF10: 0
PAINLEVEL_OUTOF10: 0

## 2018-07-05 NOTE — CARE PLAN
Problem: Safety  Goal: Will remain free from injury  Outcome: PROGRESSING AS EXPECTED  PT and OT working with pt and assessing his needs, RN and CNA assisting with transport to and from bed     Problem: Bowel/Gastric:  Goal: Normal bowel function is maintained or improved  Outcome: PROGRESSING AS EXPECTED  Senna given pt had a BM but per pt he only has them once a week. Encouraging adequate bowel motility. Encouraging mobility.

## 2018-07-05 NOTE — PROGRESS NOTES
"Pt is AAO x 4 with periods of anxiety. He states he doesn't feel anxious he just has a \"problem and the specialist can't figure out what it is\". He Required a 2 assist to the bedside commode. Asked pt how he did this at home without assistance and he states \"I do just fine my set up is better\". He continues to grunt and continuous reminders to take deep breaths are being provided. When assisting him to do things like get back in bed RN asks pt how do you do this at home and and he states that \"I do just fine at home\". BP is elevated have given his scheduled metoprolol and will discuss with MD if we need an increase in home medication. Will clarify PRN parameters and administer if indicated. Bed alarm is set, call light is within reach, hourly rounding in place, bed locked and in low position.   "

## 2018-07-05 NOTE — THERAPY
"Pt agreeable to work with PT this AM. Pt remains adamant about D/C home as he feels he will recovery better in his home environment. After extensive discussion with pt, appears that pt only leaves his home as needed. Will use W/C throughout the home, reports ability to negotiate 2 steps and drive, and uses power cart to grocery shop. Pt stated he can walk into the store or call to have cart brought to him. Pt educated extensively on the role of acute PT and goals reach in order for PT to feel confident in D/C home (primarily some ambulation and ability to negotiate steps in/out of home). Pt agreeable to ambulate with PT. Tolerated ambulation x80 ft total with noted increase WOB and quick to fatigue. Pt reports this is more than he has done in a while. Pt with poor safety awareness ambulating wtih FWW; however, no observed LOB or near falls noted. Unable to trial stair negotiation at this time due to fatigue. Pt was able to return himself BTB with SPV, reports he sleeps in an easy chair and elevates his LE's on the W/C seat.     Physical Therapy Treatment completed.   Bed Mobility:  Supine to Sit:  (in chair upon PT arrival)  Transfers: Sit to Stand: Contact Guard Assist  Gait: Level Of Assist: Contact Guard Assist with Front-Wheel Walker       Plan of Care: Will benefit from Physical Therapy 3 times per week  Discharge Recommendations: Equipment: Will Continue to Assess for Equipment Needs. Appears pt has FWW at home, unclear how agreeable he is to use for ambulation in home or community.    Post-acute therapy:   PT continues to recommend post acute placement to optimize safety, functional return, and reduce risk for falls or readmission as pt is home alone. Should pt remain against placement, strongly recommend [facility] therapy and nursing follow up to improve potential for successful transition back home.        See \"Rehab Therapy-Acute\" Patient Summary Report for complete documentation.       "

## 2018-07-05 NOTE — PROGRESS NOTES
[facility] Hospitalist Progress Note    Date of Service: 2018    Chief Complaint  77 y.o. male admitted 2018 with  AMS and pain in multiple spots including left arm and leg as well as buttock pain.  Patient went to bed at 9:30 PM last night with no pain but symptoms were present immediately after waking up this morning at 8:30 AM. With his pain, he reports left leg and left arm weakness. He confirms a minor headache at this time as well. Patient was discharged from the hospital one week ago following treatment for cellulitis and has been compliant with all of his discharge medication.  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  Now he has left arm, chest, buttock cellulitis.    Interval Problem Update  Patient refusing to work with physical therapy yesterday and today.  Patient states that he is eager to go home but feels unsteady on his feet as he has been in bed for 4 days.  Patient is encouraged to work with physical therapy for assessment.  Patient's blood pressure elevated this morning, patient's metoprolol dose increased from 50 mg to 75 mg twice a day    Consultants/Specialty  None    Disposition  PENDING  PT/OT recommend SNF placement, referral placed.         Review of Systems   HENT:        Throat pain but cannot describe it further     Respiratory: Negative for cough and shortness of breath.    Cardiovascular: Negative for chest pain.   Gastrointestinal: Negative for abdominal pain, constipation and nausea.   Neurological: Negative for dizziness and headaches.   Psychiatric/Behavioral: Negative for depression and suicidal ideas. The patient is nervous/anxious.       Physical Exam  Laboratory/Imaging   Hemodynamics  Temp (24hrs), Av.6 °C (97.8 °F), Min:36.3 °C (97.3 °F), Max:37 °C (98.6 °F)   Temperature: 36.3 °C (97.3 °F)  Pulse  Av.7  Min: 78  Max: 104   Blood  Pressure : (P) 157/87      Respiratory      Respiration: 17, Pulse Oximetry: 93 %     Work Of Breathing / Effort: (P) Mild  RUL Breath Sounds: (P) Clear, RML Breath Sounds: (P) Clear, RLL Breath Sounds: (P) Diminished, LAW Breath Sounds: (P) Clear, LLL Breath Sounds: (P) Diminished    Fluids    Intake/Output Summary (Last 24 hours) at 07/05/18 1045  Last data filed at 07/05/18 1000   Gross per 24 hour   Intake              486 ml   Output              300 ml   Net              186 ml       Nutrition  Orders Placed This Encounter   Procedures   • Diet Order Cardiac     Standing Status:   Standing     Number of Occurrences:   1     Order Specific Question:   Diet:     Answer:   Cardiac [6]     Physical Exam   Constitutional: He is oriented to person, place, and time. No distress.   HENT:   Head: Normocephalic and atraumatic.   Right Ear: External ear normal.   Left Ear: External ear normal.   Mouth/Throat: Oropharynx is clear and moist.   Eyes: Conjunctivae are normal.   Neck: Normal range of motion. Neck supple.   Cardiovascular: Normal rate, regular rhythm and normal heart sounds.    Pulmonary/Chest: Effort normal and breath sounds normal.   Abdominal: Soft. Bowel sounds are normal. There is no tenderness.   Musculoskeletal:   (+) L BKA   Neurological: He is alert and oriented to person, place, and time.   Skin: He is not diaphoretic. There is erythema.   Vitals reviewed.      Recent Labs      07/03/18   0345  07/04/18   0225   WBC  12.9*  10.6   RBC  4.08*  4.36*   HEMOGLOBIN  11.3*  11.9*   HEMATOCRIT  34.4*  37.0*   MCV  84.3  84.9   MCH  27.7  27.3   MCHC  32.8*  32.2*   RDW  46.6  47.5   PLATELETCT  247  275   MPV  9.2  9.1     Recent Labs      07/03/18   0345   SODIUM  140   POTASSIUM  3.5*   CHLORIDE  108   CO2  23   GLUCOSE  83   BUN  8   CREATININE  0.81   CALCIUM  7.9*                      Assessment/Plan     Sepsis (HCC)- (present on admission)   Assessment & Plan    - This is severe sepsis with the  following associated acute organ dysfunction(s): metabolic/septic encephalopathy.   -Due to cellulitis  - Clinically improving though WBC count slightly elevated  - Continue IV fluids  -Patient does have chronic edema and takes Lasix at baseline, this will need to be restarted once sepsis is resolved  - Discontinued vancomycin and Unasyn, on doxycycline. MRSA swab negative  - Blood cultures negative to date   - Sputum cx + Klebsiella pneumoniae         Cellulitis- (present on admission)   Assessment & Plan    - wound on his right foot  -Vancomycin and Unasyn discontinued, patient on p.o. doxycycline  -Wound care consulting  -PT/OT following, recommend skilled nursing facility, patient states that he would prefer to go home rather than go to skilled nursing facility        Essential hypertension- (present on admission)   Assessment & Plan    -Improving  - Continue home metoprolol  - Also continue as needed labetalol        Anxiety   Assessment & Plan    Pt started on Zoloft  PRN ativan ordered        Leukocytosis   Assessment & Plan    Improving, secondary to above. CTM          Acute metabolic encephalopathy- (present on admission)   Assessment & Plan    -Due to sepsis  - Slowly improving  - Continue antibiotics          COPD (chronic obstructive pulmonary disease) (CMS-[facility])- (present on admission)   Assessment & Plan    -No acute exacerbation  - Will consider breathing treatments          Quality-Core Measures   Reviewed items::  Medications reviewed  Romero catheter::  No Romero  DVT prophylaxis pharmacological::  Enoxaparin (Lovenox)  Antibiotics:  Treating active infection/contamination beyond 24 hours perioperative coverage  Assessed for rehabilitation services:  Patient was assess for and/or received rehabilitation services during this hospitalization

## 2018-07-06 LAB
BACTERIA SPEC RESP CULT: ABNORMAL
GRAM STN SPEC: ABNORMAL
SIGNIFICANT IND 70042: ABNORMAL
SITE SITE: ABNORMAL
SOURCE SOURCE: ABNORMAL

## 2018-07-06 PROCEDURE — 770006 HCHG ROOM/CARE - MED/SURG/GYN SEMI*

## 2018-07-06 PROCEDURE — A9270 NON-COVERED ITEM OR SERVICE: HCPCS | Performed by: INTERNAL MEDICINE

## 2018-07-06 PROCEDURE — 99232 SBSQ HOSP IP/OBS MODERATE 35: CPT | Performed by: INTERNAL MEDICINE

## 2018-07-06 PROCEDURE — 700102 HCHG RX REV CODE 250 W/ 637 OVERRIDE(OP): Performed by: INTERNAL MEDICINE

## 2018-07-06 PROCEDURE — 700111 HCHG RX REV CODE 636 W/ 250 OVERRIDE (IP): Performed by: INTERNAL MEDICINE

## 2018-07-06 RX ADMIN — METOPROLOL TARTRATE 75 MG: 25 TABLET, FILM COATED ORAL at 21:00

## 2018-07-06 RX ADMIN — ENOXAPARIN SODIUM 40 MG: 100 INJECTION SUBCUTANEOUS at 21:00

## 2018-07-06 RX ADMIN — SERTRALINE 25 MG: 50 TABLET, FILM COATED ORAL at 08:30

## 2018-07-06 RX ADMIN — AMITRIPTYLINE HYDROCHLORIDE 300 MG: 150 TABLET, FILM COATED ORAL at 21:03

## 2018-07-06 RX ADMIN — DOXYCYCLINE 100 MG: 100 TABLET ORAL at 21:01

## 2018-07-06 RX ADMIN — CLOPIDOGREL 75 MG: 75 TABLET, FILM COATED ORAL at 21:01

## 2018-07-06 RX ADMIN — OMEPRAZOLE 20 MG: 20 CAPSULE, DELAYED RELEASE ORAL at 08:30

## 2018-07-06 RX ADMIN — GABAPENTIN 600 MG: 300 CAPSULE ORAL at 21:00

## 2018-07-06 RX ADMIN — METOPROLOL TARTRATE 75 MG: 25 TABLET, FILM COATED ORAL at 08:30

## 2018-07-06 RX ADMIN — DOXYCYCLINE 100 MG: 100 TABLET ORAL at 08:30

## 2018-07-06 RX ADMIN — ATORVASTATIN CALCIUM 80 MG: 40 TABLET, FILM COATED ORAL at 21:01

## 2018-07-06 RX ADMIN — ASPIRIN 81 MG: 81 TABLET, COATED ORAL at 08:30

## 2018-07-06 ASSESSMENT — ENCOUNTER SYMPTOMS
DEPRESSION: 0
NERVOUS/ANXIOUS: 0
CONSTIPATION: 0
INSOMNIA: 0
HEADACHES: 0
NAUSEA: 0
ABDOMINAL PAIN: 0
COUGH: 0
SHORTNESS OF BREATH: 0

## 2018-07-06 ASSESSMENT — PAIN SCALES - GENERAL
PAINLEVEL_OUTOF10: 0

## 2018-07-06 NOTE — PROGRESS NOTES
Pt /73; recheck done at 0630 176/65. Unable to give PRN IV labetalol due to pt not having PIV. Educated pt on need for PRN BP medication; pt continues to refuse getting new PIV started. Pt does have scheduled metoprolol this AM.

## 2018-07-06 NOTE — PROGRESS NOTES
[facility] Hospitalist Progress Note    Date of Service: 2018    Chief Complaint  77 y.o. male admitted 2018 with  AMS and pain in multiple spots including left arm and leg as well as buttock pain.  Patient went to bed at 9:30 PM last night with no pain but symptoms were present immediately after waking up this morning at 8:30 AM. With his pain, he reports left leg and left arm weakness. He confirms a minor headache at this time as well. Patient was discharged from the hospital one week ago following treatment for cellulitis and has been compliant with all of his discharge medication.  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  Now he has left arm, chest, buttock cellulitis.    Interval Problem Update  Limb preservation service following, patient arterial studies were reviewed by Dr. Cruz and outpatient follow-up is recommended for evaluation of bypass graft. No acute events overnight. Pt resting in bed.     Consultants/Specialty  None    Disposition  PENDING  PT/OT recommend SNF placement, referral placed, acceptance pending.        Review of Systems   HENT:        Throat pain but cannot describe it further     Respiratory: Negative for cough and shortness of breath.    Cardiovascular: Negative for chest pain.   Gastrointestinal: Negative for abdominal pain, constipation and nausea.   Musculoskeletal: Negative for joint pain.   Neurological: Negative for headaches.   Psychiatric/Behavioral: Negative for depression. The patient is not nervous/anxious and does not have insomnia.       Physical Exam  Laboratory/Imaging   Hemodynamics  Temp (24hrs), Av.6 °C (97.8 °F), Min:36.3 °C (97.4 °F), Max:36.8 °C (98.3 °F)   Temperature: 36.3 °C (97.4 °F)  Pulse  Av.7  Min: 77  Max: 104   Blood Pressure : 158/50      Respiratory      Respiration: 18, Pulse Oximetry: 91 %     Work Of  Breathing / Effort: Mild  RUL Breath Sounds: Diminished, RML Breath Sounds: Diminished, RLL Breath Sounds: Diminished, LAW Breath Sounds: Diminished, LLL Breath Sounds: Diminished    Fluids    Intake/Output Summary (Last 24 hours) at 07/06/18 1226  Last data filed at 07/06/18 1000   Gross per 24 hour   Intake              595 ml   Output              950 ml   Net             -355 ml       Nutrition  Orders Placed This Encounter   Procedures   • Diet Order Cardiac     Standing Status:   Standing     Number of Occurrences:   1     Order Specific Question:   Diet:     Answer:   Cardiac [6]     Physical Exam   Constitutional: He is oriented to person, place, and time. No distress.   HENT:   Head: Normocephalic and atraumatic.   Right Ear: External ear normal.   Left Ear: External ear normal.   Mouth/Throat: Oropharynx is clear and moist.   Eyes: Conjunctivae are normal.   Neck: Normal range of motion. Neck supple.   Cardiovascular: Normal rate, regular rhythm and normal heart sounds.    Pulmonary/Chest: Effort normal and breath sounds normal. No stridor. No respiratory distress.   Abdominal: Soft. Bowel sounds are normal. There is no tenderness.   Musculoskeletal:   (+) L BKA   Neurological: He is alert and oriented to person, place, and time.   Skin: He is not diaphoretic. There is erythema.   Psychiatric: He has a normal mood and affect. His behavior is normal.   Vitals reviewed.      Recent Labs      07/04/18   0225   WBC  10.6   RBC  4.36*   HEMOGLOBIN  11.9*   HEMATOCRIT  37.0*   MCV  84.9   MCH  27.3   MCHC  32.2*   RDW  47.5   PLATELETCT  275   MPV  9.1                          Assessment/Plan     Sepsis (HCC)- (present on admission)   Assessment & Plan    - This is severe sepsis with the following associated acute organ dysfunction(s): metabolic/septic encephalopathy.   -Due to cellulitis  - Clinically improving though WBC count slightly elevated  - Continue IV fluids  -Patient does have chronic edema and takes  Lasix at baseline, this will need to be restarted once sepsis is resolved  - Discontinued vancomycin and Unasyn, on doxycycline. MRSA swab negative  - Blood cultures negative to date   - Sputum cx + Klebsiella pneumoniae         Cellulitis- (present on admission)   Assessment & Plan    - wound on his right foot  -Vancomycin and Unasyn discontinued, patient on p.o. doxycycline  -Wound care consulting  -PT/OT following, recommend skilled nursing facility, patient states that he would prefer to go home rather than go to skilled nursing facility  - LPS following, Vascular sx, Dr Cruz recommends outpatient follow up to discuss angiography for bypass graft surveillance.        Essential hypertension- (present on admission)   Assessment & Plan    -Improving  - Continue home metoprolol  - Also continue as needed labetalol        Anxiety   Assessment & Plan    Pt started on Zoloft  PRN ativan ordered        Leukocytosis   Assessment & Plan    Improving, secondary to above. CTM          Acute metabolic encephalopathy- (present on admission)   Assessment & Plan    -Due to sepsis  - Slowly improving  - Continue antibiotics          COPD (chronic obstructive pulmonary disease) (CMS-HCC)- (present on admission)   Assessment & Plan    -No acute exacerbation  - Will consider breathing treatments          Quality-Core Measures   Reviewed items::  Medications reviewed  Romero catheter::  No Romero  DVT prophylaxis pharmacological::  Enoxaparin (Lovenox)  Antibiotics:  Treating active infection/contamination beyond 24 hours perioperative coverage  Assessed for rehabilitation services:  Patient was assess for and/or received rehabilitation services during this hospitalization

## 2018-07-06 NOTE — PROGRESS NOTES
"PT A&O x 4, denies pain, ambulates with 1 assist with prosthetic   PT ambulated w/ nursing staff in [place]  Weaning O2 as tolerating  PT called family member for ride home and told them he was being discharged today at 1500  This RN spoke with family member and explained the patient is not yet being discharged  PT states, \"my doctor earlier said Id be able to go home today\"  PT educated on reasons why he is not yet being discharged and verbalizes understanding  Hourly rounding done, fall precautions in place, updated on plan of care   "

## 2018-07-06 NOTE — CARE PLAN
Problem: Safety  Goal: Will remain free from falls    Intervention: Assess risk factors for falls  Pt has risk of fall, moderate fall risk by franz, assist x1, LBKA with prosthetic. Unsteady. Fall precautions in place; bed lowest position, bed alarm on, treaded sock on, call light within reach, near nursing station.       Problem: Respiratory:  Goal: Respiratory status will improve    Intervention: Assess and monitor pulmonary status  Pt on 1.5L of supplemental oxygen, diminished in RML, RLL, LLL, dyspnea with exertion. Head of bed elevated,  in place, stating >90%

## 2018-07-06 NOTE — DISCHARGE PLANNING
Anticipated Discharge Disposition: Home with H/H    Action: Discussed patient's needs during IDT rounds.  Patient continues to state he does not want to go to a skilled.  Patient's on has not come in as stated.  Currently waiting on a vascular consult.     Barriers to Discharge: medical clearance and decision on skilled or H/H    Plan: will need to address placement with patient again, H/H referral if needed

## 2018-07-06 NOTE — PROGRESS NOTES
Assumed care of pt, discussed plan of care. A&Ox4. On 1.5L O2 NC, tolerates well. RML, RLL, LLL diminished. Denies pain. Last BM, 07/05 (per pt). Continent of bowel, bladder. Skin intact; x wound to right second toe. Assist x1, FWW with prosthetic to L BKA, unsteady. On cardiac diet. Takes pills whole without difficulty. No PIV. Fall precautions in place; bed lowest position, bed alarm on, treaded sock on, call light within reach. All needs met at this time.

## 2018-07-06 NOTE — PROGRESS NOTES
LIMB PRESERVATION SERVICE     77 y.o. male with a past medical history that includes alcohol abuse CAD, PAD,  COPD,  MI s/p CABG, anemia, L BKA. Admitted to [facility] for Sepsis. Presents with R 2nd dorsal toe wound.   s/p R fem-above knee popliteal bypass with PTFE graft on 2/2018 by Dr. Austin    Arterial studies completed 7/2-prelim  KALPANA RLE: monophasic and biphasic at ankle 0.84  Duplex: biphasic throughout, graft patent    Contacted pt's Vascular surgeon 7/3 to review results.   Today, Vascular associate Dr. Cruz will be consulting on pt    Photo reviewed, continue nursing orders for wound care      PLAN  Continue wound care  Vascular to see pt  Oral abx      D/C plan: SNF

## 2018-07-06 NOTE — PROGRESS NOTES
2 RN skin check completed with HOSSEIN Villegas.   No pressure ulcers or wounds noted.   Blanchable redness to elbows, heels, sacrum.  Skin tear to RUE.  Rash, redness noted to buttock.   Pictures taken and uploaded.  Otherwise, skin is intact.

## 2018-07-06 NOTE — PROGRESS NOTES
Vascular    Arterial studies reviewed  Patient will need to follow up with me or Dr. Austin as outpatient to discuss angiography for bypass graft surveillance.    Gaudencio Cruz MD  [facility] (General and Vascular Surgery)  Cell: [phone] (text or call is fine, if you don't reach me please try my office)  Office: [phone]  __________________________________________________________________  Patient:Galileo Torrez   MRN:[id number]   CSN:[id number]    7/6/2018    8:19 AM

## 2018-07-07 ENCOUNTER — HOME HEALTH ADMISSION (OUTPATIENT)
Dept: HOME HEALTH SERVICES | Facility: HOME HEALTHCARE | Age: 78
End: 2018-07-07
Payer: MEDICARE

## 2018-07-07 PROCEDURE — A9270 NON-COVERED ITEM OR SERVICE: HCPCS | Performed by: INTERNAL MEDICINE

## 2018-07-07 PROCEDURE — 770006 HCHG ROOM/CARE - MED/SURG/GYN SEMI*

## 2018-07-07 PROCEDURE — 700102 HCHG RX REV CODE 250 W/ 637 OVERRIDE(OP): Performed by: INTERNAL MEDICINE

## 2018-07-07 PROCEDURE — 99232 SBSQ HOSP IP/OBS MODERATE 35: CPT | Performed by: INTERNAL MEDICINE

## 2018-07-07 RX ADMIN — METOPROLOL TARTRATE 75 MG: 25 TABLET, FILM COATED ORAL at 20:51

## 2018-07-07 RX ADMIN — OMEPRAZOLE 20 MG: 20 CAPSULE, DELAYED RELEASE ORAL at 07:51

## 2018-07-07 RX ADMIN — DOXYCYCLINE 100 MG: 100 TABLET ORAL at 07:51

## 2018-07-07 RX ADMIN — DOXYCYCLINE 100 MG: 100 TABLET ORAL at 20:51

## 2018-07-07 RX ADMIN — AMITRIPTYLINE HYDROCHLORIDE 300 MG: 150 TABLET, FILM COATED ORAL at 20:50

## 2018-07-07 RX ADMIN — STANDARDIZED SENNA CONCENTRATE AND DOCUSATE SODIUM 2 TABLET: 8.6; 5 TABLET, FILM COATED ORAL at 20:52

## 2018-07-07 RX ADMIN — ATORVASTATIN CALCIUM 80 MG: 40 TABLET, FILM COATED ORAL at 20:51

## 2018-07-07 RX ADMIN — SERTRALINE 25 MG: 50 TABLET, FILM COATED ORAL at 07:51

## 2018-07-07 RX ADMIN — CLOPIDOGREL 75 MG: 75 TABLET, FILM COATED ORAL at 20:51

## 2018-07-07 RX ADMIN — LORAZEPAM 0.5 MG: 0.5 TABLET ORAL at 20:51

## 2018-07-07 RX ADMIN — ASPIRIN 81 MG: 81 TABLET, COATED ORAL at 07:51

## 2018-07-07 RX ADMIN — METOPROLOL TARTRATE 75 MG: 25 TABLET, FILM COATED ORAL at 07:51

## 2018-07-07 RX ADMIN — GABAPENTIN 600 MG: 300 CAPSULE ORAL at 20:51

## 2018-07-07 ASSESSMENT — ENCOUNTER SYMPTOMS
ABDOMINAL PAIN: 0
SHORTNESS OF BREATH: 0
NAUSEA: 0
HEADACHES: 0
COUGH: 0
CONSTITUTIONAL NEGATIVE: 1
INSOMNIA: 0
DEPRESSION: 0
NERVOUS/ANXIOUS: 0
CONSTIPATION: 0

## 2018-07-07 ASSESSMENT — PATIENT HEALTH QUESTIONNAIRE - PHQ9
SUM OF ALL RESPONSES TO PHQ9 QUESTIONS 1 AND 2: 0
2. FEELING DOWN, DEPRESSED, IRRITABLE, OR HOPELESS: NOT AT ALL
2. FEELING DOWN, DEPRESSED, IRRITABLE, OR HOPELESS: NOT AT ALL
1. LITTLE INTEREST OR PLEASURE IN DOING THINGS: NOT AT ALL
1. LITTLE INTEREST OR PLEASURE IN DOING THINGS: NOT AT ALL
SUM OF ALL RESPONSES TO PHQ9 QUESTIONS 1 AND 2: 0

## 2018-07-07 ASSESSMENT — PAIN SCALES - GENERAL
PAINLEVEL_OUTOF10: 0

## 2018-07-07 NOTE — DISCHARGE PLANNING
Anticipated Discharge Disposition: Home w/ HH    Action: LSW met w/ pt at bedside and explained HH services and pt agreeable to HH. Pt signed Choice for [facility] and LSW faxed to Sara BILLY ([id number]).     Barriers to Discharge: acceptance to HH    Plan: Awaiting acceptance to HH.

## 2018-07-07 NOTE — PROGRESS NOTES
PT A&O x 4, denies pain, ambulates with 1 assist with prosthetic   Weaning O2 as tolerating  Hourly rounding done, fall precautions in place, updated on plan of care

## 2018-07-07 NOTE — DISCHARGE PLANNING
We are currently verifying MD acceptance of your patient. Patient has not seen PCP since 10/2017. [facility] will need to verify if MD will be willing to sign of irf patient will need to reestablish since they have not been seen since 10/2017. Offices are closed for the weekend so we will need to verify 7/9/2018.  Thank you for your patience.  Respectfully,   [facility]

## 2018-07-07 NOTE — PROGRESS NOTES
[facility] Hospitalist Progress Note    Date of Service: 2018    Chief Complaint  77 y.o. male admitted 2018 with  AMS and pain in multiple spots including left arm and leg as well as buttock pain.  Patient went to bed at 9:30 PM last night with no pain but symptoms were present immediately after waking up this morning at 8:30 AM. With his pain, he reports left leg and left arm weakness. He confirms a minor headache at this time as well. Patient was discharged from the hospital one week ago following treatment for cellulitis and has been compliant with all of his discharge medication.  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  Now he has left arm, chest, buttock cellulitis.    Interval Problem Update  Patient upset this morning as he is eager to go home.  He was actually to the patient that home health acceptance is pending prior to safe discharge home.  Patient voices understanding.    Consultants/Specialty  None    Disposition  PENDING  PT/OT recommend SNF placement but pt prefers to go home with HH, referal placed acceptance pending, SW assisting.        Review of Systems   Constitutional: Negative.    HENT: Negative.              Respiratory: Negative for cough and shortness of breath.    Cardiovascular: Negative for chest pain.   Gastrointestinal: Negative for abdominal pain, constipation and nausea.   Musculoskeletal: Negative for joint pain.   Neurological: Negative for headaches.   Psychiatric/Behavioral: Negative for depression. The patient is not nervous/anxious and does not have insomnia.       Physical Exam  Laboratory/Imaging   Hemodynamics  Temp (24hrs), Av.5 °C (97.7 °F), Min:36.3 °C (97.4 °F), Max:36.6 °C (97.8 °F)   Temperature: 36.3 °C (97.4 °F)  Pulse  Av.6  Min: 70  Max: 104   Blood Pressure : (!) 163/63 (RN notified )      Respiratory       Respiration: 17, Pulse Oximetry: 91 %        RUL Breath Sounds: Clear, RML Breath Sounds: Clear, RLL Breath Sounds: Diminished, LAW Breath Sounds: Clear, LLL Breath Sounds: Clear    Fluids    Intake/Output Summary (Last 24 hours) at 07/07/18 1149  Last data filed at 07/06/18 2100   Gross per 24 hour   Intake              355 ml   Output             1375 ml   Net            -1020 ml       Nutrition  Orders Placed This Encounter   Procedures   • Diet Order Cardiac     Standing Status:   Standing     Number of Occurrences:   1     Order Specific Question:   Diet:     Answer:   Cardiac [6]     Physical Exam   Constitutional: He is oriented to person, place, and time. No distress.   HENT:   Head: Normocephalic and atraumatic.   Right Ear: External ear normal.   Left Ear: External ear normal.   Mouth/Throat: Oropharynx is clear and moist.   Eyes: Conjunctivae are normal.   Neck: Normal range of motion. Neck supple. No tracheal deviation present.   Cardiovascular: Normal rate, regular rhythm and normal heart sounds.    Pulmonary/Chest: Effort normal and breath sounds normal. No respiratory distress.   Abdominal: Soft. Bowel sounds are normal. There is no tenderness.   Musculoskeletal:   (+) L BKA   Neurological: He is alert and oriented to person, place, and time. No cranial nerve deficit.   Skin: He is not diaphoretic. There is erythema.   Psychiatric: He has a normal mood and affect. His behavior is normal.   Vitals reviewed.                               Assessment/Plan     Sepsis (HCC)- (present on admission)   Assessment & Plan    - This is severe sepsis with the following associated acute organ dysfunction(s): metabolic/septic encephalopathy.   -Due to cellulitis  - Clinically improving though WBC count slightly elevated  - Continue IV fluids  -Patient does have chronic edema and takes Lasix at baseline, this will need to be restarted once sepsis is resolved  - Discontinued vancomycin and Unasyn, on doxycycline. MRSA  swab negative  - Blood cultures negative to date   - Sputum cx + Klebsiella pneumoniae         Cellulitis- (present on admission)   Assessment & Plan    - wound on his right foot  -Vancomycin and Unasyn discontinued, patient on p.o. doxycycline  -Wound care consulting  -PT/OT following, recommend skilled nursing facility, patient states that he would prefer to go home rather than go to skilled nursing facility  - LPS following, Vascular sx, Dr Cruz recommends outpatient follow up to discuss angiography for bypass graft surveillance.        Essential hypertension- (present on admission)   Assessment & Plan    -Improving  - Continue home metoprolol  - Also continue as needed labetalol        Anxiety   Assessment & Plan    Pt started on Zoloft  PRN ativan ordered        Leukocytosis   Assessment & Plan    Improving, secondary to above. CTM          Acute metabolic encephalopathy- (present on admission)   Assessment & Plan    -Due to sepsis  - Slowly improving  - Continue antibiotics          COPD (chronic obstructive pulmonary disease) (CMS-HCC)- (present on admission)   Assessment & Plan    -No acute exacerbation  - Will consider breathing treatments          Quality-Core Measures   Reviewed items::  Medications reviewed  Romero catheter::  No Romero  DVT prophylaxis pharmacological::  Enoxaparin (Lovenox)  Antibiotics:  Treating active infection/contamination beyond 24 hours perioperative coverage  Assessed for rehabilitation services:  Patient was assess for and/or received rehabilitation services during this hospitalization

## 2018-07-07 NOTE — DISCHARGE PLANNING
Received Choice form at [id number]  Agency/Facility Name: [facility]  Referral sent per Choice form @ [phone]

## 2018-07-07 NOTE — CONSULTS
Vascular Surgery Consult Note  -------------------------------------------------------------------------------------------------  Date: 7/7/2018    Consulting Physician: Gaudencio Cruz M.D. [facility]  -------------------------------------------------------------------------------------------------    Reason for consultation:  Right Lower Extremity Cellulitis    HPI:  This is a 77 y.o. male who is known to Dr. Austin after a right femoral to above-knee popliteal artery bypass with 6 mm PTFE performed in February 2018.  He was admitted 7 days ago with a cellulitis of the right foot and was started on antibiotics and the cellulitis has improved significantly.  Arterial studies were done by the limb preservation service and the studies suggested a moderate restenosis of the proximal anastomosis of the bypass.  I was consulted based on these findings and whether or not an angiography procedure should be performed.    When I came to see the patient he was fully alert and conversant.  He did not have any specific complaints.  He recognizes that his foot is improving and he is anxious to go home.    Past Medical History:   Diagnosis Date   • Abnormality of gait    • Alcoholic polyneuropathy (HCC)    • Aortic valve disorders    • Atherosclerosis of renal artery (HCC)    • Blood clotting disorder ([facility]) 10/15/15      left leg   • Bowel habit changes     constipation   • Breath shortness     says from smoking  No O2   • CAD (coronary artery disease) May 2009    LAD diffuse, 80% distal, %, OM1,%, Grafts all occluded LVEF 20%.  No targets for revascularization.   • Cancer (HCC)     skin   • Carotid artery stenosis August 2014    Carotid ultrasound with LICA 50-70% stenosis, YOLANDA <50% stenosis.  Unchanged from 2013.   • Chronic systolic heart failure (HCC)    • COPD    • Cough    • Dental disorder     dentures   • Dizziness    • Fall     Multiple falls   • Heart burn    • Hiatus hernia syndrome    • High  "cholesterol    • Hyperlipidemia    • Hypertension    • Insomnia, unspecified    • Lumbago    • Myocardial infarct ([facility]) 1995x2    5 stents   • Other specified forms of chronic ischemic heart disease    • Peptic ulcer, unspecified site, unspecified as acute or chronic, without mention of hemorrhage, perforation, or obstruction    • Peripheral vascular disease, unspecified ([facility]) February 2012    PCI/stent (Omnilink 7 x 28mm) to the right external iliac.   • Pure hypercholesterolemia    • Snoring    • Stroke ([facility]) 2011    \"I don't know when, somebody said I did. A little one\"   • Tobacco use disorder    • Transient alteration of awareness    • Umbilical hernia without mention of obstruction or gangrene    • Unspecified disorder of kidney and ureter    • Unspecified hemorrhagic conditions     blood thinners   • Unspecified hypertensive heart disease without heart failure    • Upper GI bleed        Past Surgical History:   Procedure Laterality Date   • FEMORAL POPLITEAL BYPASS Right 2/7/2018    Procedure: FEMORAL POPLITEAL BYPASS;  Surgeon: Lance Austin M.D.;  Location: [facility];  Service: General   • MASS EXCISION GENERAL Left 6/8/2017    Procedure: MASS EXCISION GENERAL - SQUAMOUS CELL - EAR ;  Surgeon: Fuad Moreno M.D.;  Location: SURGERY SAME DAY [facility];  Service:    • FLAP CLOSURE Left 6/8/2017    Procedure: FLAP CLOSURE;  Surgeon: Fuad Moreno M.D.;  Location: SURGERY SAME DAY [facility];  Service:    • SPLIT THICKNESS SKIN GRAFT Left 8/25/2016    Procedure: SPLIT THICKNESS SKIN GRAFT LEG;  Surgeon: Fuad Moreno M.D.;  Location: [facility];  Service:    • STUMP REVISION Left 5/11/2016    Procedure: STUMP REVISION BK DEBRIDEMENT;  Surgeon: Lance Austin M.D.;  Location: [facility];  Service:    • KNEE AMPUTATION BELOW Left 12/22/2015    Procedure: KNEE AMPUTATION BELOW , REMOVAL OF LEFT TIBIAL BYPASS GRAFT;  Surgeon: Lance Austin M.D.;  Location: [facility];  Service:    • ANGIOGRAM Left 12/21/2015    Procedure: ANGIOGRAM, LEG;  Surgeon: Lance Austin M.D.;  Location: [facility];  Service:    • FEMORAL TIBIAL BYPASS Left 12/21/2015    Procedure: FEMORAL TIBIAL BYPASS with Distaflo Graft;  Surgeon: Lance Austin M.D.;  Location: [facility];  Service:    • FEMORAL POPLITEAL BYPASS Left 11/13/2015    Procedure: FEMORAL POPLITEAL ANGIOPLASTY cryo preserve vein placement  ;  Surgeon: Lance Austin M.D.;  Location: [facility];  Service:    • TOE AMPUTATION Left 11/9/2015    Procedure: TOE AMPUTATION FIRST AND SECOND;  Surgeon: Lance Austin M.D.;  Location: [facility];  Service:    • INCISION AND DRAINAGE GENERAL Left 11/9/2015    Procedure: INCISION AND DRAINAGE GENERAL WASHOUT, & WOUND VAC PLACEMENT LEG  ;  Surgeon: Lance Austin M.D.;  Location: [facility];  Service:    • FEMORAL POPLITEAL BYPASS Left 10/2/2015    Procedure: FEMORAL POPLITEAL BYPASS;  Surgeon: Lance Austin M.D.;  Location: [facility];  Service:    • ANGIOGRAM  10/2/2015    Procedure: ANGIOGRAM;  Surgeon: Lance Austin M.D.;  Location: [facility];  Service:    • RECOVERY  9/4/2015    Procedure: CATH LAB-[facility] W/COROS AND STENT [id number], ANGINA PECTORIS 413.9;  Surgeon: Recovery[facility] Surgery;  Location: SURGERY PRE-POST PROC UNIT [facility];  Service:    • THROMBECTOMY  3/24/2015    Performed by Lance Austin M.D. at [facility]   • ANGIOGRAM  3/24/2015    Performed by Lance Austin M.D. at [facility]   • GROIN EXPLORATION  3/11/2015    Performed by Lance Austin M.D. at [facility]   • FEMORAL POPLITEAL BYPASS  2/11/2015    Performed by Lance Austin M.D. at [facility]   • RECOVERY  1/15/2015    Performed by [facility]   • RECOVERY  2/28/2012    Performed by SURGERY, IR-RECOVERY at [facility]  DAY [facility] ORS   • GASTROSCOPY-ENDO  3/3/2010    Performed by KALE JUAREZ at ENDOSCOPY [facility] ORS   • ENDOSTAT  3/3/2010    Performed by KALE JUAREZ at ENDOSCOPY [facility] ORS   • SCLEROTHERAPHY  3/3/2010    Performed by KALE JUAREZ at ENDOSCOPY [facility] ORS   • MULTIPLE CORONARY ARTERY BYPASS  1994     CABG x 3, in [place]   • OTHER  1984    LEFT ORBIT FX-SURGERY PIN/PLATE   • CARDIAC CATH, RIGHT/LEFT HEART      STENT       Current Facility-Administered Medications   Medication Dose Route Frequency Provider Last Rate Last Dose   • metoprolol (LOPRESSOR) tablet 75 mg  75 mg Oral BID Shana Marina M.D.   75 mg at 07/07/18 0751   • LORazepam (ATIVAN) tablet 0.5 mg  0.5 mg Oral Q12HRS PRN Shana Marina M.D.       • sertraline (ZOLOFT) tablet 25 mg  25 mg Oral DAILY Shana Marina M.D.   25 mg at 07/07/18 0751   • doxycycline monohydrate (ADOXA) tablet 100 mg  100 mg Oral Q12HRS Shana Marina M.D.   100 mg at 07/07/18 0751   • amitriptyline (ELAVIL) tablet 300 mg  300 mg Oral QHS Elier Concepcion D.O.   300 mg at 07/06/18 2103   • aspirin EC (ECOTRIN) tablet 81 mg  81 mg Oral QAM Elier Concepcion D.O.   81 mg at 07/07/18 0751   • atorvastatin (LIPITOR) tablet 80 mg  80 mg Oral Q EVENING Elier Concepcion D.O.   80 mg at 07/06/18 2101   • clopidogrel (PLAVIX) tablet 75 mg  75 mg Oral Q EVENING Elier Concepcion D.O.   75 mg at 07/06/18 2101   • gabapentin (NEURONTIN) capsule 600 mg  600 mg Oral Q EVENING Elier Concepcion D.O.   600 mg at 07/06/18 2100   • omeprazole (PRILOSEC) capsule 20 mg  20 mg Oral DAILY Elier Concepcion D.O.   20 mg at 07/07/18 0751   • senna-docusate (PERICOLACE or SENOKOT S) 8.6-50 MG per tablet 2 Tab  2 Tab Oral BID Elier Concepcion D.O.   2 Tab at 07/05/18 0835    And   • polyethylene glycol/lytes (MIRALAX) PACKET 1 Packet  1 Packet Oral QDAY PRN Elier Concepcion D.O.        And   • magnesium hydroxide (MILK OF MAGNESIA) suspension 30 mL  30 mL Oral QDAY PRN Elier Concepcion,  JOSEPH        And   • bisacodyl (DULCOLAX) suppository 10 mg  10 mg Rectal QDAY PRN Elier Concepcion D.O.       • NS infusion   Intravenous Continuous Elier Concepcion D.O.   Stopped at 07/04/18 2201   • enoxaparin (LOVENOX) inj 40 mg  40 mg Subcutaneous QHS JENNIFER HuddlestonO.   40 mg at 07/06/18 2100   • acetaminophen (TYLENOL) tablet 650 mg  650 mg Oral Q6HRS PRN Elier Concepcion D.O.       • labetalol (NORMODYNE,TRANDATE) injection 10 mg  10 mg Intravenous Q4HRS PRN Elier Concepcion D.O.   Stopped at 07/05/18 0832       Social History     Social History   • Marital status:      Spouse name: N/A   • Number of children: N/A   • Years of education: N/A     Occupational History   • Not on file.     Social History Main Topics   • Smoking status: Current Every Day Smoker     Packs/day: 1.00     Years: 55.00     Types: Cigarettes   • Smokeless tobacco: Never Used   • Alcohol use No   • Drug use: No   • Sexual activity: Not Currently     Other Topics Concern   • Not on file     Social History Narrative   • No narrative on file       Family History   Problem Relation Age of Onset   • Heart Disease Mother    • Other Mother      brain surg   • Cancer Brother      melanoma       Allergies:  Neosporin pain relieving    Review of Systems:  Noncontributory except as per HPI    Physical Exam:  Blood pressure (!) 163/63, pulse 82, temperature 36.3 °C (97.4 °F), resp. rate 17, height 1.829 m (6'), weight 93.9 kg (207 lb), SpO2 91 %.    Constitutional: Alert, oriented, no acute distress  HEENT:  Normocephalic and atraumatic, EOMI  Neck:   Supple, no JVD,   Cardiovascular: Regular rate and rhythm,   Pulmonary:  Good air entry bilaterally,    Abdominal:  Soft, non-tender, non-distended     Aortic impulse not widened  Musculoskeletal: No edema, no tenderness  Neurological:  CN II-XII grossly intact, no focal deficits  Skin:   Skin is warm and dry. No rash noted.  Psychiatric:  Normal mood and affect.  Vascular:  Palpable  dorsalis pedis pulse in the right foot     No evidence of cellulitis        There is a very small ulcer on the third toe which does not appear infected    Radiology:  Arterial studies:  Waveforms of the common femoral artery are brisk and multiphasic.   A bypass graft is present from the common femoral artery to the distal    femoral artery.    Graft velocities (cm/sec):      Proximal anastomosis:  258 cm/s   Proximal:  64 cm/s   Mid:  50 cm/s       Distal:   57 cm/s   Distal anastomosis   65 cm/s.    Velocities are elevated at the common femoral artery and the proximal    anastomosis.     The body of the graft appears patent.   Stenosis of the distal femoral artery. Velocities are consistent with 50-   75% stenosis.    The distal posterior tibial artery is retrograde consistent with a more    proximal occlusion   Waveforms are biphasic to the ankle.    Assessment/Plan:  This is a 77 y.o. Male with a right lower extremity bypass that is showing evidence of restenosis.  Based on these findings I would recommend angiography and possible angioplasty.  I discussed this with the patient and I recommended that we perform this while he is here as an inpatient as it will be very efficient for him.  Otherwise he will need to be discharged and follow-up with us as an outpatient.  He indicated that he would prefer to be discharged home and then follow-up as an outpatient.  I explained that the angioplasty would most likely not significantly delayed his discharge and in fact he could potentially even go home the same day as the angioplasty procedure.  Unfortunately despite my conversation with him and recommendation to the contrary, he still decided that he would like to go home and follow-up with Dr. Austin as an outpatient.     Based on this conversation the patient should follow-up with Dr. Austin within 2 weeks after discharge to discuss angiogram and possible intervention.  If the patient happens to change his mind while  he is here before he gets discharged I would be happy to schedule him and perform this procedure to make sure this is taken care of.  I will be back to see the patient tomorrow and rediscuss this with him as it would be my preference for him to have this done here before he is discharged.      Gaudencio Cruz MD  [facility] (General and Vascular Surgery)  Cell: [phone] (text or call is fine, if you don't reach me please try my office)  Office: [phone]    7/7/2018    1:57 PM  ___________________________________________________________________  Patient:Galileo Torrez   MRN:[id number]   CSN:[id number]

## 2018-07-07 NOTE — FACE TO FACE
Face to Face Supporting Documentation - Home Health    The encounter with this patient was in whole or in part the primary reason for home health admission.    Date of encounter:   Patient:                    MRN:                       YOB: 2016  Galileo Torrez  [id number]  1940     Home health to see patient for:  Skilled Nursing care for assessment, interventions & education, Home health aide, Physical Therapy evaluation and treatment and Occupational therapy evaluation and treatment    Skilled need for:  New Onset Medical Diagnosis Secondary to cellulitis and Recent Deterioration of Health Status Medication Management assistance    Skilled nursing interventions to include:  Comment: Medication management assist    Homebound status evidenced by:  Need the aid of supportive devices such as crutches, canes, wheelchairs or walkers. Leaving home requires a considerable and taxing effort. There is a normal inability to leave the home.    Community Physician to provide follow up care: BERNADETTE Villa.     Optional Interventions? No      I certify the face to face encounter for this home health care referral meets the CMS requirements and the encounter/clinical assessment with the patient was, in whole, or in part, for the medical condition(s) listed above, which is the primary reason for home health care. Based on my clinical findings: the service(s) are medically necessary, support the need for home health care, and the homebound criteria are met.  I certify that this patient has had a face to face encounter by myself.  Shana Marina M.D. - NPI: [id number]

## 2018-07-07 NOTE — CARE PLAN
Problem: Safety  Goal: Will remain free from falls  Bed alarm is on, safety teaching reinforced, goal being met

## 2018-07-08 ENCOUNTER — PATIENT OUTREACH (OUTPATIENT)
Dept: HEALTH INFORMATION MANAGEMENT | Facility: OTHER | Age: 78
End: 2018-07-08

## 2018-07-08 PROCEDURE — A9270 NON-COVERED ITEM OR SERVICE: HCPCS | Performed by: INTERNAL MEDICINE

## 2018-07-08 PROCEDURE — 770006 HCHG ROOM/CARE - MED/SURG/GYN SEMI*

## 2018-07-08 PROCEDURE — 700102 HCHG RX REV CODE 250 W/ 637 OVERRIDE(OP): Performed by: INTERNAL MEDICINE

## 2018-07-08 PROCEDURE — 99232 SBSQ HOSP IP/OBS MODERATE 35: CPT | Performed by: INTERNAL MEDICINE

## 2018-07-08 RX ADMIN — METOPROLOL TARTRATE 75 MG: 25 TABLET, FILM COATED ORAL at 20:45

## 2018-07-08 RX ADMIN — METOPROLOL TARTRATE 75 MG: 25 TABLET, FILM COATED ORAL at 07:46

## 2018-07-08 RX ADMIN — CLOPIDOGREL 75 MG: 75 TABLET, FILM COATED ORAL at 20:45

## 2018-07-08 RX ADMIN — AMITRIPTYLINE HYDROCHLORIDE 300 MG: 150 TABLET, FILM COATED ORAL at 20:46

## 2018-07-08 RX ADMIN — ASPIRIN 81 MG: 81 TABLET, COATED ORAL at 07:46

## 2018-07-08 RX ADMIN — DOXYCYCLINE 100 MG: 100 TABLET ORAL at 07:46

## 2018-07-08 RX ADMIN — OMEPRAZOLE 20 MG: 20 CAPSULE, DELAYED RELEASE ORAL at 07:46

## 2018-07-08 RX ADMIN — GABAPENTIN 600 MG: 300 CAPSULE ORAL at 21:00

## 2018-07-08 RX ADMIN — DOXYCYCLINE 100 MG: 100 TABLET ORAL at 20:45

## 2018-07-08 RX ADMIN — SERTRALINE 25 MG: 50 TABLET, FILM COATED ORAL at 07:46

## 2018-07-08 RX ADMIN — ATORVASTATIN CALCIUM 80 MG: 40 TABLET, FILM COATED ORAL at 20:45

## 2018-07-08 ASSESSMENT — ENCOUNTER SYMPTOMS
SHORTNESS OF BREATH: 0
DEPRESSION: 0
CONSTITUTIONAL NEGATIVE: 1
INSOMNIA: 0
ABDOMINAL PAIN: 0
HEADACHES: 0
CONSTIPATION: 0
NAUSEA: 0
COUGH: 0
NERVOUS/ANXIOUS: 0

## 2018-07-08 ASSESSMENT — PAIN SCALES - GENERAL
PAINLEVEL_OUTOF10: 0

## 2018-07-08 NOTE — PROGRESS NOTES
[facility] Hospitalist Progress Note    Date of Service: 2018    Chief Complaint  77 y.o. male admitted 2018 with  AMS and pain in multiple spots including left arm and leg as well as buttock pain.  Patient went to bed at 9:30 PM last night with no pain but symptoms were present immediately after waking up this morning at 8:30 AM. With his pain, he reports left leg and left arm weakness. He confirms a minor headache at this time as well. Patient was discharged from the hospital one week ago following treatment for cellulitis and has been compliant with all of his discharge medication.  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  Now he has left arm, chest, buttock cellulitis.    Interval Problem Update  No acute events overnight.  Patient resting in bed.  Patient eager to go home.  Patient informed home health acceptance is pending.  Social work assisting with establishing patient with PCP so he can be discharged home with home health.    Consultants/Specialty  None    Disposition  PENDING  PT/OT recommend SNF placement but pt prefers to go home with HH, referal placed acceptance pending, SW assisting.        Review of Systems   Constitutional: Negative.    HENT: Negative.              Respiratory: Negative for cough and shortness of breath.    Cardiovascular: Negative for chest pain.   Gastrointestinal: Negative for abdominal pain, constipation and nausea.   Musculoskeletal: Negative for joint pain.   Neurological: Negative for headaches.   Psychiatric/Behavioral: Negative for depression. The patient is not nervous/anxious and does not have insomnia.       Physical Exam  Laboratory/Imaging   Hemodynamics  Temp (24hrs), Av.7 °C (98 °F), Min:36.4 °C (97.5 °F), Max:36.9 °C (98.4 °F)   Temperature: 36.4 °C (97.5 °F)  Pulse  Av.6  Min: 70  Max: 104   Blood Pressure : 134/49       Respiratory      Respiration: 17, Pulse Oximetry: 89 % (RN notified. Pt has COPD)        RUL Breath Sounds: Clear, RML Breath Sounds: Clear, RLL Breath Sounds: Diminished, LAW Breath Sounds: Clear, LLL Breath Sounds: Clear    Fluids    Intake/Output Summary (Last 24 hours) at 07/08/18 1144  Last data filed at 07/08/18 0900   Gross per 24 hour   Intake              440 ml   Output              300 ml   Net              140 ml       Nutrition  Orders Placed This Encounter   Procedures   • Diet Order Cardiac     Standing Status:   Standing     Number of Occurrences:   1     Order Specific Question:   Diet:     Answer:   Cardiac [6]     Physical Exam   Constitutional: He is oriented to person, place, and time. No distress.   HENT:   Head: Normocephalic and atraumatic.   Right Ear: External ear normal.   Left Ear: External ear normal.   Eyes: Conjunctivae are normal.   Neck: Normal range of motion. Neck supple. No tracheal deviation present.   Cardiovascular: Normal rate, regular rhythm and normal heart sounds.    Pulmonary/Chest: Effort normal and breath sounds normal. No respiratory distress.   Abdominal: Soft. Bowel sounds are normal. There is no tenderness.   Musculoskeletal:   (+) L BKA   Neurological: He is alert and oriented to person, place, and time.   Skin: He is not diaphoretic. There is erythema.   Psychiatric: He has a normal mood and affect. His behavior is normal.   Vitals reviewed.                               Assessment/Plan     Sepsis (HCC)- (present on admission)   Assessment & Plan    - This is severe sepsis with the following associated acute organ dysfunction(s): metabolic/septic encephalopathy.   -Due to cellulitis  - Clinically improving though WBC count slightly elevated  - Continue IV fluids  -Patient does have chronic edema and takes Lasix at baseline, this will need to be restarted once sepsis is resolved  - Discontinued vancomycin and Unasyn, on doxycycline. MRSA swab negative  - Blood  cultures negative to date   - Sputum cx + Klebsiella pneumoniae         Cellulitis- (present on admission)   Assessment & Plan    - wound on his right foot  -Vancomycin and Unasyn discontinued, patient on p.o. doxycycline  -Wound care consulting  -PT/OT following, recommend skilled nursing facility, patient states that he would prefer to go home rather than go to skilled nursing facility  - LPS following, Vascular sx, Dr Cruz recommends outpatient follow up to discuss angiography for bypass graft surveillance.        Essential hypertension- (present on admission)   Assessment & Plan    -Improving  - Continue home metoprolol  - Also continue as needed labetalol        Anxiety   Assessment & Plan    Pt started on Zoloft  PRN ativan ordered        Leukocytosis   Assessment & Plan    Improving, secondary to above. CTM          Acute metabolic encephalopathy- (present on admission)   Assessment & Plan    -Due to sepsis  - Slowly improving  - Continue antibiotics          COPD (chronic obstructive pulmonary disease) (CMS-HCC)- (present on admission)   Assessment & Plan    -No acute exacerbation  - Will consider breathing treatments          Quality-Core Measures   Reviewed items::  Medications reviewed  Romero catheter::  No Romero  DVT prophylaxis pharmacological::  Enoxaparin (Lovenox)  Antibiotics:  Treating active infection/contamination beyond 24 hours perioperative coverage  Assessed for rehabilitation services:  Patient was assess for and/or received rehabilitation services during this hospitalization

## 2018-07-08 NOTE — CARE PLAN
Problem: Safety  Goal: Will remain free from injury  Outcome: PROGRESSING AS EXPECTED      Problem: Pain Management  Goal: Pain level will decrease to patient's comfort goal  Outcome: MET Date Met: 07/08/18

## 2018-07-09 ENCOUNTER — APPOINTMENT (OUTPATIENT)
Dept: RADIOLOGY | Facility: MEDICAL CENTER | Age: 78
DRG: 853 | End: 2018-07-09
Attending: SURGERY
Payer: MEDICARE

## 2018-07-09 PROCEDURE — 700111 HCHG RX REV CODE 636 W/ 250 OVERRIDE (IP)

## 2018-07-09 PROCEDURE — A9270 NON-COVERED ITEM OR SERVICE: HCPCS | Performed by: INTERNAL MEDICINE

## 2018-07-09 PROCEDURE — 700102 HCHG RX REV CODE 250 W/ 637 OVERRIDE(OP): Performed by: INTERNAL MEDICINE

## 2018-07-09 PROCEDURE — 75710 ARTERY X-RAYS ARM/LEG: CPT

## 2018-07-09 PROCEDURE — 047M3ZZ DILATION OF RIGHT POPLITEAL ARTERY, PERCUTANEOUS APPROACH: ICD-10-PCS | Performed by: SURGERY

## 2018-07-09 PROCEDURE — 770006 HCHG ROOM/CARE - MED/SURG/GYN SEMI*

## 2018-07-09 PROCEDURE — B41DYZZ FLUOROSCOPY OF AORTA AND BILATERAL LOWER EXTREMITY ARTERIES USING OTHER CONTRAST: ICD-10-PCS | Performed by: SURGERY

## 2018-07-09 PROCEDURE — 99232 SBSQ HOSP IP/OBS MODERATE 35: CPT | Performed by: INTERNAL MEDICINE

## 2018-07-09 PROCEDURE — 700111 HCHG RX REV CODE 636 W/ 250 OVERRIDE (IP): Performed by: SURGERY

## 2018-07-09 PROCEDURE — 700117 HCHG RX CONTRAST REV CODE 255: Performed by: SURGERY

## 2018-07-09 PROCEDURE — B41FZZZ FLUOROSCOPY OF RIGHT LOWER EXTREMITY ARTERIES: ICD-10-PCS | Performed by: SURGERY

## 2018-07-09 PROCEDURE — 04CM3ZZ EXTIRPATION OF MATTER FROM RIGHT POPLITEAL ARTERY, PERCUTANEOUS APPROACH: ICD-10-PCS | Performed by: SURGERY

## 2018-07-09 PROCEDURE — 700111 HCHG RX REV CODE 636 W/ 250 OVERRIDE (IP): Performed by: INTERNAL MEDICINE

## 2018-07-09 PROCEDURE — 99153 MOD SED SAME PHYS/QHP EA: CPT

## 2018-07-09 RX ORDER — PROTAMINE SULFATE 10 MG/ML
INJECTION, SOLUTION INTRAVENOUS
Status: COMPLETED
Start: 2018-07-09 | End: 2018-07-09

## 2018-07-09 RX ORDER — VERAPAMIL HYDROCHLORIDE 2.5 MG/ML
5 INJECTION, SOLUTION INTRAVENOUS ONCE
Status: COMPLETED | OUTPATIENT
Start: 2018-07-09 | End: 2018-07-09

## 2018-07-09 RX ORDER — VERAPAMIL HYDROCHLORIDE 2.5 MG/ML
INJECTION, SOLUTION INTRAVENOUS
Status: COMPLETED
Start: 2018-07-09 | End: 2018-07-09

## 2018-07-09 RX ORDER — MIDAZOLAM HYDROCHLORIDE 1 MG/ML
.5-2 INJECTION INTRAMUSCULAR; INTRAVENOUS PRN
Status: ACTIVE | OUTPATIENT
Start: 2018-07-09 | End: 2018-07-09

## 2018-07-09 RX ORDER — HEPARIN SODIUM,PORCINE 1000/ML
VIAL (ML) INJECTION
Status: COMPLETED
Start: 2018-07-09 | End: 2018-07-09

## 2018-07-09 RX ORDER — MIDAZOLAM HYDROCHLORIDE 1 MG/ML
INJECTION INTRAMUSCULAR; INTRAVENOUS
Status: COMPLETED
Start: 2018-07-09 | End: 2018-07-09

## 2018-07-09 RX ORDER — DIPHENHYDRAMINE HYDROCHLORIDE 50 MG/ML
50 INJECTION INTRAMUSCULAR; INTRAVENOUS EVERY 6 HOURS PRN
Status: DISCONTINUED | OUTPATIENT
Start: 2018-07-09 | End: 2018-07-10 | Stop reason: HOSPADM

## 2018-07-09 RX ORDER — HEPARIN SODIUM 1000 [USP'U]/ML
5000 INJECTION, SOLUTION INTRAVENOUS; SUBCUTANEOUS ONCE
Status: COMPLETED | OUTPATIENT
Start: 2018-07-09 | End: 2018-07-09

## 2018-07-09 RX ORDER — ONDANSETRON 2 MG/ML
4 INJECTION INTRAMUSCULAR; INTRAVENOUS PRN
Status: ACTIVE | OUTPATIENT
Start: 2018-07-09 | End: 2018-07-09

## 2018-07-09 RX ORDER — SODIUM CHLORIDE 9 MG/ML
500 INJECTION, SOLUTION INTRAVENOUS
Status: ACTIVE | OUTPATIENT
Start: 2018-07-09 | End: 2018-07-09

## 2018-07-09 RX ORDER — IODIXANOL 270 MG/ML
70 INJECTION, SOLUTION INTRAVASCULAR ONCE
Status: COMPLETED | OUTPATIENT
Start: 2018-07-09 | End: 2018-07-09

## 2018-07-09 RX ORDER — HEPARIN SODIUM 1000 [USP'U]/ML
3000 INJECTION, SOLUTION INTRAVENOUS; SUBCUTANEOUS ONCE
Status: COMPLETED | OUTPATIENT
Start: 2018-07-09 | End: 2018-07-09

## 2018-07-09 RX ORDER — MIDAZOLAM HYDROCHLORIDE 1 MG/ML
INJECTION INTRAMUSCULAR; INTRAVENOUS
Status: DISPENSED
Start: 2018-07-09 | End: 2018-07-10

## 2018-07-09 RX ORDER — PROTAMINE SULFATE 10 MG/ML
40 INJECTION, SOLUTION INTRAVENOUS ONCE
Status: COMPLETED | OUTPATIENT
Start: 2018-07-09 | End: 2018-07-09

## 2018-07-09 RX ADMIN — NITROGLYCERIN 25 ML: 20 INJECTION INTRAVENOUS at 14:34

## 2018-07-09 RX ADMIN — VERAPAMIL HYDROCHLORIDE 5 MG: 2.5 INJECTION, SOLUTION INTRAVENOUS at 14:33

## 2018-07-09 RX ADMIN — FENTANYL CITRATE 50 MCG: 50 INJECTION, SOLUTION INTRAMUSCULAR; INTRAVENOUS at 14:30

## 2018-07-09 RX ADMIN — CLOPIDOGREL 75 MG: 75 TABLET, FILM COATED ORAL at 22:04

## 2018-07-09 RX ADMIN — HEPARIN SODIUM 3000 UNITS: 1000 INJECTION, SOLUTION INTRAVENOUS; SUBCUTANEOUS at 14:32

## 2018-07-09 RX ADMIN — DOXYCYCLINE 100 MG: 100 TABLET ORAL at 22:04

## 2018-07-09 RX ADMIN — IODIXANOL 70 ML: 270 INJECTION, SOLUTION INTRAVASCULAR at 15:07

## 2018-07-09 RX ADMIN — PROTAMINE SULFATE 40 MG: 10 INJECTION, SOLUTION INTRAVENOUS at 15:05

## 2018-07-09 RX ADMIN — MIDAZOLAM 1 MG: 1 INJECTION INTRAMUSCULAR; INTRAVENOUS at 14:45

## 2018-07-09 RX ADMIN — MIDAZOLAM 1 MG: 1 INJECTION INTRAMUSCULAR; INTRAVENOUS at 13:44

## 2018-07-09 RX ADMIN — FENTANYL CITRATE 50 MCG: 50 INJECTION, SOLUTION INTRAMUSCULAR; INTRAVENOUS at 13:44

## 2018-07-09 RX ADMIN — ATORVASTATIN CALCIUM 80 MG: 40 TABLET, FILM COATED ORAL at 22:04

## 2018-07-09 RX ADMIN — GABAPENTIN 600 MG: 300 CAPSULE ORAL at 22:03

## 2018-07-09 RX ADMIN — HEPARIN SODIUM 5000 UNITS: 1000 INJECTION, SOLUTION INTRAVENOUS; SUBCUTANEOUS at 14:09

## 2018-07-09 RX ADMIN — METOPROLOL TARTRATE 75 MG: 25 TABLET, FILM COATED ORAL at 22:03

## 2018-07-09 RX ADMIN — MIDAZOLAM 1 MG: 1 INJECTION INTRAMUSCULAR; INTRAVENOUS at 14:31

## 2018-07-09 RX ADMIN — AMITRIPTYLINE HYDROCHLORIDE 300 MG: 150 TABLET, FILM COATED ORAL at 22:03

## 2018-07-09 ASSESSMENT — ENCOUNTER SYMPTOMS
CONSTIPATION: 0
HEADACHES: 0
DEPRESSION: 0
NAUSEA: 0
SHORTNESS OF BREATH: 0
NERVOUS/ANXIOUS: 0
INSOMNIA: 0
CONSTITUTIONAL NEGATIVE: 1
COUGH: 0
ABDOMINAL PAIN: 0

## 2018-07-09 ASSESSMENT — PAIN SCALES - GENERAL
PAINLEVEL_OUTOF10: 0

## 2018-07-09 NOTE — PROGRESS NOTES
LIMB PRESERVATION SERVICE NOTE:    Subjective:      Reason for Consultation: Right Foot - 2nd Digit    History of Present Illness:      Patient is well known to LPS and outpatient wound care services.     Patient is a 77 y.o. male with a past medical history that includes alcohol abuse CAD, PAD,  COPD,  MI s/p CABG, anemia  and is admitted to [facility] for Sepsis. An LPS consult has been requested for evaluation of his Right foot wound.  This wound started as scratch from a GLF per the pt.  The pt has not been treating the wound. The pt is a smoker.   Patient does not have diabetes. Patient prior surgeries to the lower extremities include: a Left BKA. Fem pop bypass by Dr orantes on 2/7/18        Lab Results   Component Value Date/Time    HBA1C 6.0 (H) 10/12/2016 07:37 AM      Patient denies fevers chills, nausea, vomiting.      Pain:        Patient resting comfortably, Right Heel pain periodic    Vitals  BP (!) 166/96   Pulse 77   Temp 37 °C (98.6 °F)   Resp 15   Ht 1.829 m (6')   Wt 93.9 kg (207 lb)   SpO2 95%   BMI 28.07 kg/m²       Objective:      PHYSICAL EXAMINATION:      General Appearance:  Well developed,  nourished, in no acute distress     Sensory Assessment        Patient sensation intact with light touch 10 of 10 points on right foot        Vascular Assessment       right +1 DP easily obliterated, not palpated PT right foot     Orthotic, protective, supportive devices:      Offloading  Heel floated     Wound Characteristics                                                    Location:Right Foot - Second Digit    Initial Evaluation  Date:7/2/2018 7/9/2018   Tissue Type and %: 100% Red 50% Red 50% [place]   Periwound: Pink Pink   Drainage: None None   Exposed structures None None   Wound Edges:   Attached Attached   Odor: None None   S&S of Infection:   None None   Edema: Localized at Site Localized at Site   Sensation: Intact Intact               Measurements: Initial Evaluation  Date:7/2/2018  7/9/2018     Length (cm) 1 1   Width (cm) 1 1   Depth (cm)      Tract/undermine                 Tests and Measures:    Labs      No results for input(s): SODIUM, POTASSIUM, CHLORIDE, CO2, GLUCOSE, BUN, CPKTOTAL in the last 72 hours.    A1C 6.0% 2016  ESR: 31  CRP: 5.45    KALPANA 7/2/18    R: 0.84  L: Na    RLE: Triphasic/Monophasic   LLE:     Right.    Doppler waveform of the common femoral artery is of high amplitude and    triphasic.    Doppler waveforms of the dorsalis pedis artery at the ankle are brisk and    biphasic.    Doppler waveforms of the posterior tibial artery at the ankle are    monophasic and retrograde.    Ankle-brachial index is mildly reduced.      Arterial duplex scan was performed in accordance with lower extremity    arterial evaluation protocol - see separate report.     Duplex 7/2/18    FINDINGS   Right.    Waveforms of the common femoral vein are brisk and multiphasic.   A bypass graft is present from the common femoral artery to the distal    femoral artery.    Graft velocities (cm/sec):      Proximal anastomosis:  258 cm/s   Proximal:  64 cm/s   Mid:  50 cm/s       Distal:   57 cm/s   Distal anastomosis   65 cm/s.    Velocities are elevated at the common femoral artery and the proximal    anastomosis.     The body of the graft appears patent.   Stenosis of the distal femoral artery. Velocities are consistent with 50-   75% stenosis.    The distal posterior tibial artery is retrograde consistent with a more    proximal occlusion   Waveforms are biphasic to the ankle.  Imaging    X-Ray:   LE ART/KALPANA         LE ART DUPLX/IMAG         DX-FOOT-COMPLETE 3+ RIGHT   Final Result      Negative RIGHT foot series. No plain film evidence of osteomyelitis.      CT-HEAD W/O   Final Result      1.  No acute intracranial process.      2.  Atrophy and small vessel ischemic change.      DX-CHEST-PORTABLE (1 VIEW)   Final Result      No radiographic evidence of acute cardiopulmonary process.      IR-EXTREMITY  ANGIOGRAM-UNILATERAL RIGHT    (Results Pending)     Infection Management  Microbiology NA       NURSING TO CHANGE RIGHT FOOT DRESSING EVERY 72 HOURS AND PRN FOR SATURATION OR DISLODGEMENT  Nursing to cleanse wound/periwound with Normal Saline (NS).  Pat periwound dry and apply skin prep/No Sting to periwound.  Let air dry for 1-2 minutes.  Apply a piece of AqAg (Hydrofiber Silver), cut to size, to the wound bed.  Cover with non adhesive foam, cut to size, and secure with hypafix tape.  Please take Weekly Wound Photos. Notify wound team if wound deteriorates or fails to progress.     Patient Education: Arterial Disease       Plan:       Treatment Plan and Recommendations:     1. Labs\Imaging:         Reviewed                                        2. Treatment:              Continue Wound Care by Nursing, LPS to Follow.                                      Dr Cruz to due angiography today to eval for bypass stenosis     Dressing Orders Updated. Skin Care Updated.                                       Nursing to change every 72 and PRN for Saturation or Dislodgement                                      Antibiotics per IM     3. Orthotics/Prosthetics:                                       pt to get orthotics/prosthetics  as OP ability involved, pt to wear shoes that do not rub on Wound sites         Anticipated discharge plans (X):              SNF:                       Home Care:                       Outpatient Wound Center:                   Self Care:                        Other:                       TBD: X     Professional Collaboration: D/W:  marva Contreras

## 2018-07-09 NOTE — CARE PLAN
Problem: Safety  Goal: Will remain free from injury  Outcome: PROGRESSING AS EXPECTED  All safety precautions in place. Bed alarm on. Prosthetic out of reach for patient to call for assistance. No evidence of falls or harm.

## 2018-07-09 NOTE — PROGRESS NOTES
Vascular    IR today for right lower extremity angio and possible intervention    Gaudencio Cruz MD  [facility] (General and Vascular Surgery)  Cell: [phone] (text or call is fine, if you don't reach me please try my office)  Office: [phone]  __________________________________________________________________  Patient:Galileo Abdimartha   MRN:[id number]   CSN:[id number]    7/9/2018    9:19 AM

## 2018-07-09 NOTE — PROGRESS NOTES
[facility] Hospitalist Progress Note    Date of Service: 2018    Chief Complaint  77 y.o. male admitted 2018 with  AMS and pain in multiple spots including left arm and leg as well as buttock pain.  Patient went to bed at 9:30 PM last night with no pain but symptoms were present immediately after waking up this morning at 8:30 AM. With his pain, he reports left leg and left arm weakness. He confirms a minor headache at this time as well. Patient was discharged from the hospital one week ago following treatment for cellulitis and has been compliant with all of his discharge medication.  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  Now he has left arm, chest, buttock cellulitis.    Interval Problem Update  No acute events overnight.  Patient scheduled for angiogram with possible intervention today.  Patient accepted home health pending medical clearance.     Consultants/Specialty  None    Disposition  PENDING  PT/OT recommend SNF placement but pt prefers to go home with HH, accepted, SW assisting.        Review of Systems   Constitutional: Negative.    HENT: Negative.              Respiratory: Negative for cough and shortness of breath.    Cardiovascular: Negative for chest pain.   Gastrointestinal: Negative for abdominal pain, constipation and nausea.   Musculoskeletal: Negative for joint pain.   Neurological: Negative for headaches.   Psychiatric/Behavioral: Negative for depression. The patient is not nervous/anxious and does not have insomnia.       Physical Exam  Laboratory/Imaging   Hemodynamics  Temp (24hrs), Av.7 °C (98 °F), Min:36.2 °C (97.1 °F), Max:37 °C (98.6 °F)   Temperature: 37 °C (98.6 °F)  Pulse  Av.5  Min: 66  Max: 104   Blood Pressure : (!) 166/96      Respiratory      Respiration: 15, Pulse Oximetry: 95 %     Work Of Breathing / Effort: Mild  RUL Breath  Sounds: Diminished, RML Breath Sounds: Diminished, RLL Breath Sounds: Diminished, LAW Breath Sounds: Diminished, LLL Breath Sounds: Diminished    Fluids    Intake/Output Summary (Last 24 hours) at 07/09/18 1200  Last data filed at 07/09/18 1100   Gross per 24 hour   Intake                0 ml   Output             1200 ml   Net            -1200 ml       Nutrition  Orders Placed This Encounter   Procedures   • DIET NPO     Standing Status:   Standing     Number of Occurrences:   8     Order Specific Question:   Restrict to:     Answer:   Sips with Medications [3]     Physical Exam   Constitutional: He is oriented to person, place, and time. No distress.   HENT:   Head: Normocephalic and atraumatic.   Right Ear: External ear normal.   Left Ear: External ear normal.   Eyes: Conjunctivae are normal.   Neck: Normal range of motion. Neck supple. No tracheal deviation present.   Cardiovascular: Normal rate, regular rhythm and normal heart sounds.    Pulmonary/Chest: Effort normal and breath sounds normal. No respiratory distress. He has no wheezes.   Abdominal: Soft. Bowel sounds are normal. There is no tenderness.   Musculoskeletal:   (+) L BKA   Neurological: He is alert and oriented to person, place, and time.   Skin: He is not diaphoretic.   Psychiatric: He has a normal mood and affect. His behavior is normal.   Vitals reviewed.                               Assessment/Plan     Sepsis (HCC)- (present on admission)   Assessment & Plan    - This is severe sepsis with the following associated acute organ dysfunction(s): metabolic/septic encephalopathy.   -Due to cellulitis  - Clinically improving though WBC count slightly elevated  - Continue IV fluids  -Patient does have chronic edema and takes Lasix at baseline, this will need to be restarted once sepsis is resolved  - Discontinued vancomycin and Unasyn, on doxycycline. MRSA swab negative  - Blood cultures negative to date   - Sputum cx + Klebsiella pneumoniae          Cellulitis- (present on admission)   Assessment & Plan    - wound on his right foot  -Vancomycin and Unasyn discontinued, patient on p.o. doxycycline  -Wound care consulting  -PT/OT following, recommend skilled nursing facility, patient states that he would prefer to go home rather than go to skilled nursing facility  - LPS following, Vascular sx, Dr Cruz following, patient scheduled for angiogram and possible intervention today        Essential hypertension- (present on admission)   Assessment & Plan    -Improving  - Continue home metoprolol  - Also continue as needed labetalol        Anxiety   Assessment & Plan    Pt started on Zoloft  PRN ativan ordered        Leukocytosis   Assessment & Plan    Improving, secondary to above. CTM          Acute metabolic encephalopathy- (present on admission)   Assessment & Plan    -Due to sepsis  - Slowly improving  - Continue antibiotics          COPD (chronic obstructive pulmonary disease) (CMS-HCC)- (present on admission)   Assessment & Plan    -No acute exacerbation  - Will consider breathing treatments          Quality-Core Measures   Reviewed items::  Medications reviewed  Romero catheter::  No Romero  DVT prophylaxis pharmacological::  Enoxaparin (Lovenox)  Antibiotics:  Treating active infection/contamination beyond 24 hours perioperative coverage  Assessed for rehabilitation services:  Patient was assess for and/or received rehabilitation services during this hospitalization

## 2018-07-09 NOTE — CARE PLAN
Problem: Knowledge Deficit  Goal: Knowledge of disease process/condition, treatment plan, diagnostic tests, and medications will improve  Outcome: PROGRESSING AS EXPECTED  Patient verbalizes understanding of discharge plan and angiogram procedure scheduled for today.

## 2018-07-09 NOTE — PROGRESS NOTES
Patient alert and oriented. Patient agitated at times and states that he wants to go home. Multiple calls to IR to find scheduled time for Angiogram-no answer, no call back. Patient refused all medications. Patient NPO since midnight, but educated patient that he can have medications with sips of water. Patient still refused medication. Dressing to right toe intact. Patient has prosthetic at bedside. All fall precautions in place. Patient calls appropriately. Patient educated on plan of care. All belongings and call light within reach. Monitoring to continue.

## 2018-07-09 NOTE — PROGRESS NOTES
Vascular    No acute changes  Alert, conversant    BP (!) 165/60 Comment: RN notified  Pulse 79   Temp 36.8 °C (98.3 °F)   Resp 18   Ht 1.829 m (6')   Wt 93.9 kg (207 lb)   SpO2 92%   BMI 28.07 kg/m²     Exam stable    A/P)  I discussed with patient again the recommendation for angiography due to possible bypass stenosis.  This time he was agreeable to having it done while inpatient.    Will plan to perform this tomorrow (Monday), time TBD    Patient could still be discharged home tomorrow even after the procedure, does not necessitate overnight stay after angio.    Gaudencio Cruz MD  [facility] (General and Vascular Surgery)  Cell: [phone] (text or call is fine, if you don't reach me please try my office)  Office: [phone]  __________________________________________________________________  Patient:Galileo Torrez   MRN:[id number]   CSN:[id number]    7/8/2018    9:09 PM

## 2018-07-09 NOTE — PROGRESS NOTES
Vascular    Successful right popliteal artery revascularization.    Patient can be discharged home this even provided at least 1-2 hours of observation, then ambulation to make sure he is cleared for discharge.    Provided prescription for plavix. Patient should continue aspirin and plavix indefinitely.    Patient can follow-up with me in 1-2 weeks for progress check.    Gaudencio Cruz MD  [facility] (General and Vascular Surgery)  Cell: [phone] (text or call is fine, if you don't reach me please try my office)  Office: [phone]  __________________________________________________________________  Patient:Galileo Torrez   MRN:[id number]   CSN:[id number]    7/9/2018    3:15 PM

## 2018-07-09 NOTE — DISCHARGE PLANNING
ATTN: Case Management  RE: Referral for Home Health                We would like to take this opportunity to thank you for submitting a referral for your patient to continue care with [facility]. Our skilled team is dedicated to helping all patients recover and gain independence in the home setting.            As of 7/9/2018, we have accepted the above patient into our service. A [facility] clinician will be out to see the patient within 48 hours to conduct our initial visit. If you have any questions or concerns regarding the patient’s transition to Home Health, please do not hesitate to contact us. We are open for referrals 7 days a week from 8AM to 5PM at [phone].      We look forward to collaborating with you,  [facility] Team

## 2018-07-09 NOTE — PROGRESS NOTES
IR Nursing Note:    Patient underwent a Right lower extremity angiogram with angioplasty by Dr. Cruz.  Site marked and initialed by MD prior to procedure starting and verified by patient and procedure team.  Pedal pulse prior to case Right doppler.  Patient was placed in a supine position. Left femoral artery was accessed. Vitals were taken every 5 minutes and remained stable during procedure (see doc flow sheet for results).  CO2 waveform capnography was monitored and remained 28-36 throughout procedure. Angioseal was left artery.     A tegaderm and gauze dressing was placed over surgical site. Report called to Yolande CATALAN. Pt transported via bed with RN to S633-1 in a stable condition.    Angio-seal 6 F Ref #[id number] Lot #[id number]

## 2018-07-09 NOTE — CARE PLAN
Problem: Safety  Goal: Will remain free from injury  Outcome: PROGRESSING AS EXPECTED  Pt safety education completed and bed alarm on.     Problem: Knowledge Deficit  Goal: Knowledge of disease process/condition, treatment plan, diagnostic tests, and medications will improve  Outcome: PROGRESSING AS EXPECTED  Pt education on being nothing by mouth at midnight for his procedure tomorrow morning.

## 2018-07-09 NOTE — OP REPORT
DATE OF SERVICE:  07/09/2018    PREOPERATIVE DIAGNOSES:  1.  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  2.  History of a right femoral to above knee popliteal artery bypass.  3.  Bypass graft restenosis.  4.  Hypertension.  5.  Hyperlipidemia.  6.  Chronic obstructive pulmonary disease.  7.  Tobacco use.  8.  History of stroke.  9.  Coronary artery disease.    POSTOPERATIVE DIAGNOSES:  1.  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  2.  History of a right femoral to above knee popliteal artery bypass.  3.  Bypass graft restenosis.  4.  Hypertension.  5.  Hyperlipidemia.  6.  Chronic obstructive pulmonary disease.  7.  Tobacco use.  8.  History of stroke.  9.  Coronary artery disease.    PROCEDURES PERFORMED:  1.  Percutaneous access of the left common femoral artery with ultrasound   guidance.  2.  Nonselective catheterization of the aorta.  3.  Aortoiliac angiogram.  4.  Selective catheterization of the right common femoral artery.  5.  Right lower extremity angiography.  6.  Selective catheterization of the right below knee popliteal artery for   deployment of a filter embolic protection device.  7.  Jetstream rotational atherectomy of the right above-knee popliteal artery   with a 2.4 mm atherectomy device.  8.  Balloon angioplasty of the right above-knee popliteal artery with a 4x60   mm plain angioplasty balloon.  9.  Balloon angioplasty of the right above-knee popliteal artery with a 5x60   mm drug-coated angioplasty balloon.  10.  Angio-Seal percutaneous closure of the left common femoral artery access   site.    SURGEON:  Gaudencio Cruz MD    ASSISTANT:  None.    ANESTHESIA:  Local plus sedation, approximately 8 mL of 1% lidocaine was used.    FINDINGS:  No significant aortoiliac disease on either side.  There was no   significant disease of the common femoral artery or the proximal anastomosis   of the bypass graft on  the right side.  There was a significantly stenosed in   the above knee popliteal artery just distal to the distal bypass graft   anastomosis.  There was good patency of the below knee popliteal artery and   2-vessel runoff to the foot via the anterior tibial and peroneal arteries.    COMPLICATIONS:  None.    DISPOSITION:  The patient was sent to recovery in stable condition.    DESCRIPTION OF PROCEDURE:  Following informed consent, the patient was placed   supine on the operating table and IV sedation was given to make the patient   comfortable.  The bilateral groins were prepped and draped sterile, surgical   time-out was called, everyone was in agreement.    Local anesthetic was infiltrated at the insertion site.  We then accessed the   left common femoral artery percutaneously with ultrasound guidance.  We   dilated this up to a 4-Danish sheath and we advanced an Omniflush catheter and   angled Glidewire to the infrarenal aorta.  We performed an aortoiliac   angiogram, which showed no significant disease bilaterally.    We then selected the right common femoral artery and we performed a right   lower extremity runoff angiography.  This demonstrated good patency of the   proximal bypass graft anastomosis; however, there was stenosis of the above   knee popliteal artery just distal to the distal anastomosis of the bypass   graft.  There was good patency of the below-knee popliteal artery and 2-vessel   runoff to the foot via the anterior tibial and peroneal arteries.    At this point, the patient was systemically heparinized and this was   maintained throughout the procedure.  We then advanced a 7-Danish sheath up   over into the right femoral to popliteal artery bypass graft.  We then used a   bare wire and a crossing catheter to select down into the below knee popliteal   artery and here we placed a filter embolic protection device.  We then passed   a Jetstream rotational atherectomy device through the above  knee popliteal   artery.  First 3 passes with the blades down and then 2 passes with the blades   up, achieving a significant amount of the debulking of the above knee   popliteal disease.  We then performed a balloon angioplasty with a 4 mm plain   angioplasty balloon and this was followed by a balloon angioplasty of the   popliteal artery with a 5x60 mm drug-coated balloon with a 3-minute inflation.    The completion angiography demonstrated good luminal patency through the   above knee popliteal artery and improved runoff through the right lower leg.    There was still 2-vessel runoff via the anterior tibial and peroneal arteries   and good filling of the foot vessels.  We then retrieved the filter.  We   removed the 7-Somali sheath and we closed the site with a 6-Somali Angio-Seal,   which deployed successfully.  Manual pressure was held for a couple of   minutes to reduce swelling and then a dry sterile dressing was placed.    The patient had a palpable dorsalis pedis pulse in the right foot at the   conclusion of the case.  He was sent to recovery in stable condition.       ____________________________________     MD ADRIANO Gutierrez / XANDER    DD:  07/09/2018 15:08:40  DT:  07/09/2018 15:32:48    D#:  [id number]  Job#:  [id number]

## 2018-07-09 NOTE — PROGRESS NOTES
Patient returned from IR. Patient stable. Left Femoral site assessed. No edema. Gauze with tegaderm intact. Patient educated on laying flat for 4 hours-patient verbalized understanding, but still expressed desire to be discharged. Right pedal pulse palpable-marked with x. VS taken as ordered. Patient denies pain. All belongings and call light within reach. Monitoring to continue.

## 2018-07-10 VITALS
HEART RATE: 71 BPM | HEIGHT: 72 IN | WEIGHT: 207 LBS | DIASTOLIC BLOOD PRESSURE: 71 MMHG | TEMPERATURE: 98.5 F | RESPIRATION RATE: 16 BRPM | OXYGEN SATURATION: 93 % | BODY MASS INDEX: 28.04 KG/M2 | SYSTOLIC BLOOD PRESSURE: 150 MMHG

## 2018-07-10 PROBLEM — D72.829 LEUKOCYTOSIS: Status: RESOLVED | Noted: 2018-07-03 | Resolved: 2018-07-10

## 2018-07-10 PROCEDURE — 99239 HOSP IP/OBS DSCHRG MGMT >30: CPT | Performed by: INTERNAL MEDICINE

## 2018-07-10 PROCEDURE — A9270 NON-COVERED ITEM OR SERVICE: HCPCS | Performed by: INTERNAL MEDICINE

## 2018-07-10 PROCEDURE — 700101 HCHG RX REV CODE 250: Performed by: INTERNAL MEDICINE

## 2018-07-10 PROCEDURE — 97116 GAIT TRAINING THERAPY: CPT

## 2018-07-10 PROCEDURE — 700102 HCHG RX REV CODE 250 W/ 637 OVERRIDE(OP): Performed by: INTERNAL MEDICINE

## 2018-07-10 PROCEDURE — 97530 THERAPEUTIC ACTIVITIES: CPT

## 2018-07-10 RX ORDER — METOPROLOL TARTRATE 75 MG/1
75 TABLET, FILM COATED ORAL 2 TIMES DAILY
Qty: 60 TAB | Refills: 1 | Status: SHIPPED | OUTPATIENT
Start: 2018-07-10

## 2018-07-10 RX ORDER — DOXYCYCLINE 100 MG/1
100 TABLET ORAL EVERY 12 HOURS
Qty: 6 TAB | Refills: 0 | Status: SHIPPED | OUTPATIENT
Start: 2018-07-10 | End: 2018-07-13

## 2018-07-10 RX ADMIN — OMEPRAZOLE 20 MG: 20 CAPSULE, DELAYED RELEASE ORAL at 05:11

## 2018-07-10 RX ADMIN — LABETALOL HYDROCHLORIDE 10 MG: 5 INJECTION, SOLUTION INTRAVENOUS at 07:55

## 2018-07-10 RX ADMIN — METOPROLOL TARTRATE 75 MG: 25 TABLET, FILM COATED ORAL at 05:11

## 2018-07-10 RX ADMIN — ASPIRIN 81 MG: 81 TABLET, COATED ORAL at 05:11

## 2018-07-10 RX ADMIN — DOXYCYCLINE 100 MG: 100 TABLET ORAL at 05:11

## 2018-07-10 RX ADMIN — SERTRALINE 25 MG: 50 TABLET, FILM COATED ORAL at 05:12

## 2018-07-10 ASSESSMENT — GAIT ASSESSMENTS
DISTANCE (FEET): 10
ASSISTIVE DEVICE: FRONT WHEEL WALKER
GAIT LEVEL OF ASSIST: CONTACT GUARD ASSIST
DEVIATION: BRADYKINETIC;SHUFFLED GAIT

## 2018-07-10 ASSESSMENT — COGNITIVE AND FUNCTIONAL STATUS - GENERAL
TURNING FROM BACK TO SIDE WHILE IN FLAT BAD: A LITTLE
MOVING TO AND FROM BED TO CHAIR: A LOT
CLIMB 3 TO 5 STEPS WITH RAILING: A LOT
MOBILITY SCORE: 16
WALKING IN HOSPITAL ROOM: A LITTLE
SUGGESTED CMS G CODE MODIFIER MOBILITY: CK
MOVING FROM LYING ON BACK TO SITTING ON SIDE OF FLAT BED: A LITTLE
STANDING UP FROM CHAIR USING ARMS: A LITTLE

## 2018-07-10 ASSESSMENT — PAIN SCALES - GENERAL: PAINLEVEL_OUTOF10: 0

## 2018-07-10 NOTE — DISCHARGE SUMMARY
Discharge Summary    CHIEF COMPLAINT ON ADMISSION  Chief Complaint   Patient presents with   • Pain     left sided pain, all over   • Leg Swelling       Reason for Admission  Pain of left side of whole-body    Admission Date  6/30/2018    CODE STATUS  Prior    HPI & HOSPITAL COURSE  77 y.o. male admitted 6/30/2018 with  AMS and pain in multiple spots including left arm and leg as well as buttock pain.  Patient went to bed at 9:30 PM last night with no pain but symptoms were present immediately after waking up this morning at 8:30 AM. With his pain, he reports left leg and left arm weakness. He confirms a minor headache at this time as well. Patient was discharged from the hospital one week ago following treatment for cellulitis and has been compliant with all of his discharge medication.  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  Now he has left arm, chest, buttock cellulitis.  Patient was treated with IV antibiotics.  Patient's cellulitis improved and resolved.  Patient was also seen by vascular surgery and angiogram was performed.  Patient was treated with balloon angioplasty by vascular surgery during the angiogram.  Patient tolerated procedure.  Patient was instructed to continue aspirin and Plavix.    Angiogram findings are listed as below.:No significant aortoiliac disease on either side.  There was no   significant disease of the common femoral artery or the proximal anastomosis   of the bypass graft on the right side.  There was a significantly stenosed in   the above knee popliteal artery just distal to the distal bypass graft   anastomosis.  There was good patency of the below knee popliteal artery and   2-vessel runoff to the foot via the anterior tibial and peroneal arteries    Patient currently stable and ready to be discharged home and follow-up with vascular surgery.        Therefore, he is discharged in good and stable condition to home with close outpatient follow-up.    The patient met 2-midnight criteria for an inpatient stay at the time of discharge.    Discharge Date  7/10/2018    FOLLOW UP ITEMS POST DISCHARGE  none    DISCHARGE DIAGNOSES  Active Problems:    Essential hypertension POA: Yes    Cellulitis POA: Yes    COPD (chronic obstructive pulmonary disease) (CMS-HCC) POA: Yes    Acute metabolic encephalopathy POA: Yes    Anxiety POA: Yes  Resolved Problems:    Sepsis ([facility]) POA: Yes    Leukocytosis POA: Yes      FOLLOW UP  Future Appointments  Date Time Provider Department Center   7/31/2018 11:25 AM FELIX Villa M.D.  [address] [address]  [phone]    Schedule an appointment as soon as possible for a visit in 2 weeks  For Progress Check      MEDICATIONS ON DISCHARGE     Medication List      START taking these medications      Instructions   doxycycline monohydrate 100 MG tablet  Commonly known as:  ADOXA   Take 1 Tab by mouth every 12 hours for 3 days.  Dose:  100 mg        CHANGE how you take these medications      Instructions   Metoprolol Tartrate 75 MG Tabs  What changed:  · medication strength  · how much to take  · when to take this   Take 75 mg by mouth 2 Times a Day.  Dose:  75 mg        CONTINUE taking these medications      Instructions   amitriptyline 150 MG Tabs  Commonly known as:  ELAVIL   Take 2 Tabs by mouth every bedtime.  Dose:  300 mg     aspirin EC 81 MG Tbec  Commonly known as:  ECOTRIN   Take 81 mg by mouth every morning. Indications: Heart Attack  Dose:  81 mg     atorvastatin 80 MG tablet  Commonly known as:  LIPITOR   Take 1 Tab by mouth every evening.  Dose:  80 mg     clopidogrel 75 MG Tabs  Commonly known as:  PLAVIX   Take 75 mg by mouth every evening.  Dose:  75 mg     ezetimibe 10 MG Tabs  Commonly known as:  ZETIA   Take 10 mg by mouth every evening.  Dose:  10  "mg     furosemide 20 MG Tabs  Commonly known as:  LASIX   Take 1 Tab by mouth every day.  Dose:  20 mg     gabapentin 300 MG Caps  Commonly known as:  NEURONTIN   TAKE 2 CAPSULES BY MOUTH EVERY NIGHT AT BEDTIME     omeprazole 20 MG delayed-release capsule  Commonly known as:  PRILOSEC   Take 1 Cap by mouth every day.  Dose:  20 mg     potassium chloride SA 20 MEQ Tbcr  Commonly known as:  Kdur   Take 1 Tab by mouth every day.  Dose:  20 mEq     raNITidine 150 MG Tabs  Commonly known as:  ZANTAC   Doctor's comments:  **Patient requests 90 days supply**  Take 1 Tab by mouth 2 times a day.  Dose:  150 mg            Allergies  Allergies   Allergen Reactions   • Neosporin Pain Relieving Unspecified     Pt states \"I get blisters\".       DIET  No orders of the defined types were placed in this encounter.      ACTIVITY  As tolerated.  Weight bearing as tolerated    CONSULTATIONS  Vascular surgery    PROCEDURES  Angiogram + balloon angioplasty and Jetstream rotational atherectomy of the right above-knee popliteal artery :     no significant aortoiliac disease on either side.  There was no   significant disease of the common femoral artery or the proximal anastomosis   of the bypass graft on the right side.  There was a significantly stenosed in   the above knee popliteal artery just distal to the distal bypass graft   anastomosis.  There was good patency of the below knee popliteal artery and   2-vessel runoff to the foot via the anterior tibial and peroneal arteries    LE ART/KALPANA         LE ART DUPLX/IMAG         DX-FOOT-COMPLETE 3+ RIGHT   Final Result      Negative RIGHT foot series. No plain film evidence of osteomyelitis.      CT-HEAD W/O   Final Result      1.  No acute intracranial process.      2.  Atrophy and small vessel ischemic change.      DX-CHEST-PORTABLE (1 VIEW)   Final Result      No radiographic evidence of acute cardiopulmonary process.      IR-EXTREMITY ANGIOGRAM-UNILATERAL RIGHT    (Results Pending) "       PE:  Gen: AAOx3, NAD  Eyes: PELLA  Neck: no JVD, no lymphadenopathy  Cardia: RRR, no mrg  Lungs: CTAB, no rales, rhonci or wheezing  Abd: NABS, soft, non extended, no mass  Neuro: CN II-XII intact, non focal, reflex 2+ symmetrical  Skin: Intact, no lesion, warm  Psych: Appropriate.      LABORATORY  Lab Results   Component Value Date    SODIUM 140 07/03/2018    POTASSIUM 3.5 (L) 07/03/2018    CHLORIDE 108 07/03/2018    CO2 23 07/03/2018    GLUCOSE 83 07/03/2018    BUN 8 07/03/2018    CREATININE 0.81 07/03/2018        Lab Results   Component Value Date    WBC 10.6 07/04/2018    HEMOGLOBIN 11.9 (L) 07/04/2018    HEMATOCRIT 37.0 (L) 07/04/2018    PLATELETCT 275 07/04/2018        Total time of the discharge process exceeds 36 minutes.

## 2018-07-10 NOTE — CARE PLAN
Problem: Venous Thromboembolism (VTW)/Deep Vein Thrombosis (DVT) Prevention:  Goal: Patient will participate in Venous Thrombosis (VTE)/Deep Vein Thrombosis (DVT)Prevention Measures  Outcome: PROGRESSING AS EXPECTED  RN encouraged pt to ambulate today. Pt verbalized understanding. RN will have pt get up for lunch today.     Problem: Skin Integrity  Goal: Risk for impaired skin integrity will decrease  Outcome: PROGRESSING AS EXPECTED  Pt moves himself around in bed. RN reminds pt during hourly rounding.

## 2018-07-10 NOTE — PROGRESS NOTES
Assumed care of pt at 0700. Pt is A&O x4. Pt is on 2L oxygen NC, RN will titrate today. Pt has a L. MINH. RN administered PRN BP medication this am for /69. Pt reports no pain. Physical therapy in the room this morning working with pt. Treaded slipper socks in use. Pt is in room near nursing station. Hourly rounding in place.

## 2018-07-10 NOTE — DISCHARGE INSTRUCTIONS
Discharge Instructions    Discharged to home by car with relative. Discharged via wheelchair, hospital escort: Yes.  Special equipment needed: Not Applicable    Be sure to schedule a follow-up appointment with your primary care doctor or any specialists as instructed.     Discharge Plan:   Diet Plan: Discussed (Regular 1.5g sodium )  Activity Level: Discussed (as tolerated)  Smoking Cessation Offered: Patient Counseled  Confirmed Follow up Appointment: Patient to Call and Schedule Appointment  Confirmed Symptoms Management: Discussed  Medication Reconciliation Updated: Yes  Influenza Vaccine Indication: Not indicated: Previously immunized this influenza season and > 8 years of age    I understand that a diet low in cholesterol, fat, and sodium is recommended for good health. Unless I have been given specific instructions below for another diet, I accept this instruction as my diet prescription.   Other diet: Regular diet. Keep sodium intake at about 1.5g per day    Special Instructions:   Cellulitis, Adult  Introduction  Cellulitis is a skin infection. The infected area is usually red and sore. This condition occurs most often in the arms and lower legs. It is very important to get treated for this condition.  Follow these instructions at home:  · Take over-the-counter and prescription medicines only as told by your doctor.  · If you were prescribed an antibiotic medicine, take it as told by your doctor. Do not stop taking the antibiotic even if you start to feel better.  · Drink enough fluid to keep your pee (urine) clear or pale yellow.  · Do not touch or rub the infected area.  · Raise (elevate) the infected area above the level of your heart while you are sitting or lying down.  · Place warm or cold wet cloths (warm or cold compresses) on the infected area. Do this as told by your doctor.  · Keep all follow-up visits as told by your doctor. This is important. These visits let your doctor make sure your infection  is not getting worse.  Contact a doctor if:  · You have a fever.  · Your symptoms do not get better after 1-2 days of treatment.  · Your bone or joint under the infected area starts to hurt after the skin has healed.  · Your infection comes back. This can happen in the same area or another area.  · You have a swollen bump in the infected area.  · You have new symptoms.  · You feel ill and also have muscle aches and pains.  Get help right away if:  · Your symptoms get worse.  · You feel very sleepy.  · You throw up (vomit) or have watery poop (diarrhea) for a long time.  · There are red streaks coming from the infected area.  · Your red area gets larger.  · Your red area turns darker.  This information is not intended to replace advice given to you by your health care provider. Make sure you discuss any questions you have with your health care provider.  Document Released: 06/05/2009 Document Revised: 05/25/2017 Document Reviewed: 10/26/2016  © 2017 [employer]vier    Discharge Instructions: Eating a Low-Salt Diet  Your healthcare provider has prescribed a low-salt diet for you. Most people with heart problems need to eat less salt, which is full of sodium. Too much sodium is linked to high blood pressure, which is linked to a greater risk of heart disease, stroke, blindness, and kidney problems.  Home care    Learn ways to cut back on salt (sodium):  · Eat less frozen, canned, dried, packaged, and fast foods. These often contain high amounts of sodium.  · Season foods with herbs instead of salt when you cook.  · Season with flavorings such as pepper, lemon, garlic, and onion.  · Don’t add salt to your food at the table.  · Sprinkle salt-free herbal blends on meats and vegetables.  Learn to read food labels carefully:  · Look for the total amount of sodium per serving.  · Look for foods labeled low sodium, reduced sodium, or no added salt.  · Beware. Salt goes by many names. Cut down on foods with these words (all forms of  salt) listed as ingredients:  ? Salt  ? Sodium  ? Soy sauce  ? Baking soda  ? Baking powder  ? MSG (monosodium glutamate)  ? Monosodium  ? Na (the chemical symbol for sodium)  Other ideas:  · Use more fresh food. Buy more fruits and vegetables.  · Select lean meats, fish, and poultry.  · Find a cookbook with low-salt recipes. You’ll find ideas for tasty meals that are healthy for your heart.  · When eating out, ask questions about the menu. Tell the  you're on a low-salt diet.  ? If you order fish, chicken, beef, or pork, ask to have it broiled, baked, poached, or grilled without salt, butter, or breading.  ? Choose plain steamed rice, boiled noodles, and baked or boiled potatoes. Top potatoes with chives and a little sour cream instead of butter.  · Avoid antacids that are high in salt. Check the label before you buy.  Follow-up  Make a follow-up appointment with your healthcare provider, or as advised. Your provider may refer you to a dietitian.     Low-Salt Choices  Eating salt (sodium) can make your body retain too much water. Excess water makes your heart work harder. Canned, packaged, and frozen foods are easy to prepare. But they are often high in sodium. Here are some ideas for low-salt foods you can easily make yourself.     For breakfast  · Fruit or 100% fruit juice  · Whole-wheat bread or an English muffin. Look for sodium content on Nutrition Facts labels.  · Low-fat milk or yogurt  · Unsalted eggs  · Shredded wheat  · Corn tortillas  · Unsalted steamed rice  · Regular (not instant) hot cereal, made without salt  Stay away from:  · Sausage, mackenzie, and ham  · Flour tortillas  · Packaged muffins, pancakes, and biscuits  · Instant hot cereals  · Cottage cheese  For lunch and dinner  · Fresh fish, chicken, turkey, or meat--baked, broiled, or roasted without salt  · Dry beans, cooked without salt  · Tofu, stir-fried without salt  · Unsalted fresh fruit and vegetables, or frozen or canned fruit and  vegetables with no added salt  Stay away from:  · Lunch or deli meat that is cured or smoked  · Cheese  · Tomato juice and ketchup  · Canned vegetables, soups, and fish not labeled as no-salt-added or reduced sodium  · Packaged gravies and sauces  · Olives, pickles, and relish  · Bottled salad dressings  For snacks and desserts  · Yogurt  · Unsalted, air-popped popcorn  · Unsalted nuts or seeds  Stay away from:  · Pies and cakes  · Packaged dessert mixes  · Pizza  · Canned and packaged puddings  · Pretzels, chips, crackers, and nuts--unless the label says unsalted    · Is patient discharged on Warfarin / Coumadin?   No     Depression / Suicide Risk    As you are discharged from this [facility] facility, it is important to learn how to keep safe from harming yourself.    Recognize the warning signs:  · Abrupt changes in personality, positive or negative- including increase in energy   · Giving away possessions  · Change in eating patterns- significant weight changes-  positive or negative  · Change in sleeping patterns- unable to sleep or sleeping all the time   · Unwillingness or inability to communicate  · Depression  · Unusual sadness, discouragement and loneliness  · Talk of wanting to die  · Neglect of personal appearance   · Rebelliousness- reckless behavior  · Withdrawal from people/activities they love  · Confusion- inability to concentrate     If you or a loved one observes any of these behaviors or has concerns about self-harm, here's what you can do:  · Talk about it- your feelings and reasons for harming yourself  · Remove any means that you might use to hurt yourself (examples: pills, rope, extension cords, firearm)  · Get professional help from the community (Mental Health, Substance Abuse, psychological counseling)  · Do not be alone:Call your Safe Contact- someone whom you trust who will be there for you.  · Call your local CRISIS HOTLINE 078-4390 or 767-635-6354  · Call your local Children's  Mobile Crisis Response Team [place] (037) 363-5717 or www.Teliris.Progression  · Call the toll free National Suicide Prevention Hotlines   · National Suicide Prevention Lifeline 531-397-IQDQ (4696)  · National Hope Line Network 800-SUICIDE (553-2798)

## 2018-07-10 NOTE — THERAPY
"Physical Therapy Treatment completed.   Bed Mobility:  Supine to Sit: Minimal Assist  Transfers: Sit to Stand: Contact Guard Assist  Gait: Level Of Assist: Contact Guard Assist with Front-Wheel Walker       Plan of Care: Will benefit from Physical Therapy 3 times per week  Discharge Recommendations: Equipment: Will Continue to Assess for Equipment Needs.     See \"Rehab Therapy-Acute\" Patient Summary Report for complete documentation.     Pt appears to be at his PLOF. Pt stating that he doesn't do anything during the day. He states he stays in his chair all day and night. Pt was able to don/doff prosthetic independently. He is able to ambulate short distances however once fatigues shows very poor safety awareness. Although pt appears unsteady, he had no noted LOB. Pt will benefit from HH.  "

## 2018-07-10 NOTE — PROGRESS NOTES
Pt AOx4, no report of pain s/p angiogram. Left femoral incision site dressing reinforced. Pt fatigued resting quietly in bed watching television. Left toe 2nd digit dressing changed. Call light within reach, personal belongings available, bed in lowest position, treaded socks on, and bed alarm on. Hourly rounding in place.

## 2018-07-10 NOTE — CARE PLAN
Problem: Safety  Goal: Will remain free from injury  Pt has left BKA, s/p angiogram. Safety precautions in place. Bed in low position, treaded socks on, personal possessions in reach, call light in reach and pt using it to call for assistance. Bed alarm on.    Problem: Knowledge Deficit  Goal: Knowledge of disease process/condition, treatment plan, diagnostic tests, and medications will improve  Discussed plan of care s/p angiogram. Answered pt questions.

## 2018-07-10 NOTE — PROGRESS NOTES
Pt discharged home with family. IV d/c by RN, tip intact. Discharge paperwork discussed with pt and signed, all questions were answered at this time. All pt belongings gathered and sent home with pt. Pt went down to [facility] via wheelchair with CNA

## 2018-07-11 ENCOUNTER — PATIENT OUTREACH (OUTPATIENT)
Dept: HEALTH INFORMATION MANAGEMENT | Facility: OTHER | Age: 78
End: 2018-07-11

## 2018-07-16 ENCOUNTER — HOME CARE VISIT (OUTPATIENT)
Dept: HOME HEALTH SERVICES | Facility: HOME HEALTHCARE | Age: 78
End: 2018-07-16
Payer: MEDICARE

## 2018-07-16 ENCOUNTER — TELEPHONE (OUTPATIENT)
Dept: HEALTH INFORMATION MANAGEMENT | Facility: OTHER | Age: 78
End: 2018-07-16

## 2018-07-16 ENCOUNTER — PATIENT OUTREACH (OUTPATIENT)
Dept: HEALTH INFORMATION MANAGEMENT | Facility: OTHER | Age: 78
End: 2018-07-16

## 2018-07-16 VITALS
HEART RATE: 87 BPM | WEIGHT: 182.38 LBS | OXYGEN SATURATION: 94 % | HEIGHT: 72 IN | SYSTOLIC BLOOD PRESSURE: 130 MMHG | DIASTOLIC BLOOD PRESSURE: 75 MMHG | TEMPERATURE: 97 F | RESPIRATION RATE: 14 BRPM | BODY MASS INDEX: 24.7 KG/M2

## 2018-07-16 PROCEDURE — 665999 HH PPS REVENUE DEBIT

## 2018-07-16 PROCEDURE — G0493 RN CARE EA 15 MIN HH/HOSPICE: HCPCS

## 2018-07-16 PROCEDURE — 665001 SOC-HOME HEALTH

## 2018-07-16 PROCEDURE — 665998 HH PPS REVENUE CREDIT

## 2018-07-16 SDOH — ECONOMIC STABILITY: HOUSING INSECURITY: UNSAFE COOKING RANGE AREA: 0

## 2018-07-16 SDOH — ECONOMIC STABILITY: HOUSING INSECURITY: UNSAFE APPLIANCES: 0

## 2018-07-16 SDOH — ECONOMIC STABILITY: HOUSING INSECURITY
HOME SAFETY: NT/CAREGIVER TO MAKE ONE AS SOON AS POSSIBLE. PATIENT DOES NOT HAVE FLAMMABLE MATERIALS PRESENT IN THE HOME PRESENTING A FIRE HAZARD. EVIDENCE FOUND OF SMOKING MATERIALS PRESENT IN THE HOME.

## 2018-07-16 SDOH — ECONOMIC STABILITY: HOUSING INSECURITY
HOME SAFETY: .OXYGEN SAFETY RISK ASSESSMENT PERFORMED. PATIENT DOES HAVE A NO SMOKING SIGN POSTED IN THE HOME. PATIENT DOES HAVE A WORKING FIRE EXTINGUISHER PRESENT IN THE HOME. INSTRUCTED PATIENT/CAREGIVER TO GET ONE AS SOON AS POSSIBLE. PATIENT INSTRUCTED PATIE

## 2018-07-16 ASSESSMENT — ENCOUNTER SYMPTOMS
NAUSEA: DENIES
VOMITING: DENIES
SHORTNESS OF BREATH: T

## 2018-07-16 ASSESSMENT — PATIENT HEALTH QUESTIONNAIRE - PHQ9
2. FEELING DOWN, DEPRESSED, IRRITABLE, OR HOPELESS: 00
1. LITTLE INTEREST OR PLEASURE IN DOING THINGS: 00

## 2018-07-16 ASSESSMENT — ACTIVITIES OF DAILY LIVING (ADL)
OASIS_M1830: 03
HOME_HEALTH_OASIS: 04
HOME_HEALTH_OASIS: 01

## 2018-07-17 ENCOUNTER — HOME CARE VISIT (OUTPATIENT)
Dept: HOME HEALTH SERVICES | Facility: HOME HEALTHCARE | Age: 78
End: 2018-07-17
Payer: MEDICARE

## 2018-07-17 PROCEDURE — 665998 HH PPS REVENUE CREDIT

## 2018-07-17 PROCEDURE — 665999 HH PPS REVENUE DEBIT

## 2018-07-17 NOTE — TELEPHONE ENCOUNTER
Med review completed for home health. Discrepancy noted with [facility]. Outbound call to patient to assess why he is not taking. Could not reach patient. Please let me know if I can help with adherence to medication regimen.

## 2018-07-18 ENCOUNTER — HOME CARE VISIT (OUTPATIENT)
Dept: HOME HEALTH SERVICES | Facility: HOME HEALTHCARE | Age: 78
End: 2018-07-18
Payer: MEDICARE

## 2018-07-18 VITALS
TEMPERATURE: 96.9 F | SYSTOLIC BLOOD PRESSURE: 128 MMHG | OXYGEN SATURATION: 94 % | DIASTOLIC BLOOD PRESSURE: 78 MMHG | RESPIRATION RATE: 16 BRPM

## 2018-07-18 PROCEDURE — G0152 HHCP-SERV OF OT,EA 15 MIN: HCPCS

## 2018-07-18 PROCEDURE — 665999 HH PPS REVENUE DEBIT

## 2018-07-18 PROCEDURE — 665998 HH PPS REVENUE CREDIT

## 2018-07-18 SDOH — ECONOMIC STABILITY: HOUSING INSECURITY: HOME SAFETY: LIVES IN A TRAILER -- CLUTTERED ENVIRONMENT  STEPS TO OUTSIDE TRAILER

## 2018-07-18 ASSESSMENT — ACTIVITIES OF DAILY LIVING (ADL)
HOUSEKEEPING_ASSISTANCE: 4
LAUNDRY_ASSISTANCE: 6
DRESSING_LB_ASSISTANCE: 1
GROOMING_ASSISTANCE: 0
SHOPPING_ASSISTANCE: 6
TELEPHONE_ASSISTANCE: 0
TOILETING_ASSISTANCE: 0
TRANSPORTATION_ASSISTANCE: 6
SHOPPING_ASSISTANCE: 5
EATING_ASSISTANCE: 0
BATHING_ASSISTANCE: 1
ORAL_CARE_ASSISTANCE: 0
MEAL_PREP_ASSISTANCE: 0
HOUSEKEEPING_ASSISTANCE: 5
DRESSING_UB_ASSISTANCE: 0

## 2018-07-19 ENCOUNTER — HOME CARE VISIT (OUTPATIENT)
Dept: HOME HEALTH SERVICES | Facility: HOME HEALTHCARE | Age: 78
End: 2018-07-19
Payer: MEDICARE

## 2018-07-19 ENCOUNTER — TELEPHONE (OUTPATIENT)
Dept: MEDICAL GROUP | Facility: PHYSICIAN GROUP | Age: 78
End: 2018-07-19

## 2018-07-19 VITALS
HEART RATE: 94 BPM | TEMPERATURE: 97.5 F | OXYGEN SATURATION: 95 % | SYSTOLIC BLOOD PRESSURE: 94 MMHG | DIASTOLIC BLOOD PRESSURE: 66 MMHG

## 2018-07-19 VITALS
TEMPERATURE: 97.5 F | HEART RATE: 94 BPM | RESPIRATION RATE: 16 BRPM | SYSTOLIC BLOOD PRESSURE: 120 MMHG | DIASTOLIC BLOOD PRESSURE: 54 MMHG | OXYGEN SATURATION: 95 %

## 2018-07-19 DIAGNOSIS — J43.9 PULMONARY EMPHYSEMA, UNSPECIFIED EMPHYSEMA TYPE (HCC): ICD-10-CM

## 2018-07-19 DIAGNOSIS — I50.22 CHRONIC SYSTOLIC HEART FAILURE (HCC): ICD-10-CM

## 2018-07-19 DIAGNOSIS — R62.7 ADULT FAILURE TO THRIVE SYNDROME: ICD-10-CM

## 2018-07-19 DIAGNOSIS — T87.50 AMPUTATION STUMP NECROSIS (HCC): ICD-10-CM

## 2018-07-19 PROCEDURE — G0299 HHS/HOSPICE OF RN EA 15 MIN: HCPCS

## 2018-07-19 PROCEDURE — A6234 HYDROCOLLD DRG <=16 W/O BDR: HCPCS

## 2018-07-19 PROCEDURE — A5120 SKIN BARRIER, WIPE OR SWAB: HCPCS

## 2018-07-19 PROCEDURE — G0151 HHCP-SERV OF PT,EA 15 MIN: HCPCS

## 2018-07-19 PROCEDURE — 665998 HH PPS REVENUE CREDIT

## 2018-07-19 PROCEDURE — 665997 HH PPS REVENUE ADJ

## 2018-07-19 PROCEDURE — 665999 HH PPS REVENUE DEBIT

## 2018-07-19 SDOH — ECONOMIC STABILITY: HOUSING INSECURITY: HOME SAFETY: HOME IS DIRTY, CLUTTERED, NO ACCESS TO BATHROOM DUE TO FURNITURE, STEPS AND CLUTTER.

## 2018-07-19 ASSESSMENT — ENCOUNTER SYMPTOMS
VOMITING: NO
POOR JUDGMENT: 1
DIFFICULTY THINKING: 1
DIFFICULTY THINKING: 1
NAUSEA: NO

## 2018-07-19 ASSESSMENT — ACTIVITIES OF DAILY LIVING (ADL)
DRESSING_LB_ASSISTANCE: 5
TELEPHONE_ASSISTANCE: 6

## 2018-07-19 NOTE — TELEPHONE ENCOUNTER
1. Caller Name: [facility]                                         Call Back Number: [phone]      Patient approves a detailed voicemail message: N\A    2. SPECIFIC Action To Be Taken: Referral pending, please sign.    3. Diagnosis/Clinical Reason for Request: pt is unsafe at home    4. Specialty & Provider Name/Lab/Imaging Location: skilled nursing    5. Is appointment scheduled for requested order/referral: no    Patient was not informed they will receive a return phone call from the office ONLY if there are any questions before processing their request. Advised to call back if they haven't received a call from the referral department in 5 days.

## 2018-07-20 ENCOUNTER — HOSPITAL ENCOUNTER (OUTPATIENT)
Dept: PAIN MANAGEMENT | Facility: REHABILITATION | Age: 78
End: 2018-07-20
Attending: PAIN MEDICINE
Payer: MEDICARE

## 2018-07-20 PROCEDURE — 665999 HH PPS REVENUE DEBIT

## 2018-07-20 PROCEDURE — 665998 HH PPS REVENUE CREDIT

## 2018-07-21 PROCEDURE — 665998 HH PPS REVENUE CREDIT

## 2018-07-21 PROCEDURE — 665999 HH PPS REVENUE DEBIT

## 2018-07-22 PROCEDURE — 665998 HH PPS REVENUE CREDIT

## 2018-07-22 PROCEDURE — 665999 HH PPS REVENUE DEBIT

## 2018-07-23 ENCOUNTER — HOME CARE VISIT (OUTPATIENT)
Dept: HOME HEALTH SERVICES | Facility: HOME HEALTHCARE | Age: 78
End: 2018-07-23
Payer: MEDICARE

## 2018-07-23 ENCOUNTER — PATIENT OUTREACH (OUTPATIENT)
Dept: HEALTH INFORMATION MANAGEMENT | Facility: OTHER | Age: 78
End: 2018-07-23

## 2018-07-23 PROCEDURE — 665999 HH PPS REVENUE DEBIT

## 2018-07-23 PROCEDURE — 665998 HH PPS REVENUE CREDIT

## 2018-07-24 PROCEDURE — 665999 HH PPS REVENUE DEBIT

## 2018-07-24 PROCEDURE — 665998 HH PPS REVENUE CREDIT

## 2018-07-25 PROCEDURE — 665999 HH PPS REVENUE DEBIT

## 2018-07-25 PROCEDURE — 665998 HH PPS REVENUE CREDIT

## 2018-07-26 PROCEDURE — 665999 HH PPS REVENUE DEBIT

## 2018-07-26 PROCEDURE — 665998 HH PPS REVENUE CREDIT

## 2018-07-27 PROCEDURE — 665999 HH PPS REVENUE DEBIT

## 2018-07-27 PROCEDURE — 665998 HH PPS REVENUE CREDIT

## 2018-07-28 PROCEDURE — 665998 HH PPS REVENUE CREDIT

## 2018-07-28 PROCEDURE — 665999 HH PPS REVENUE DEBIT

## 2018-07-29 PROCEDURE — 665999 HH PPS REVENUE DEBIT

## 2018-07-29 PROCEDURE — 665998 HH PPS REVENUE CREDIT

## 2018-07-30 PROCEDURE — 665998 HH PPS REVENUE CREDIT

## 2018-07-30 PROCEDURE — 665999 HH PPS REVENUE DEBIT

## 2018-08-07 NOTE — PROGRESS NOTES
Galileo Torrez was originally admitted on 6/30/18 for leg swelling and pain, he was discharged on 7/10/18. Patient discharged with [facility] and they became concerned as well, due to concerns from both parties a Referral was sent to [facility] on 7/19 and was approved on 7/20, patient passed away on 7/23.
